# Patient Record
Sex: FEMALE | Race: BLACK OR AFRICAN AMERICAN | NOT HISPANIC OR LATINO | Employment: OTHER | URBAN - METROPOLITAN AREA
[De-identification: names, ages, dates, MRNs, and addresses within clinical notes are randomized per-mention and may not be internally consistent; named-entity substitution may affect disease eponyms.]

---

## 2017-05-17 ENCOUNTER — HISTORICAL (OUTPATIENT)
Dept: INTERNAL MEDICINE | Facility: CLINIC | Age: 52
End: 2017-05-17

## 2017-05-17 LAB
ABS NEUT (OLG): 2.65 X10(3)/MCL (ref 2.1–9.2)
ALBUMIN SERPL-MCNC: 3.5 GM/DL (ref 3.4–5)
ALBUMIN/GLOB SERPL: 1 {RATIO}
ALP SERPL-CCNC: 61 UNIT/L (ref 20–120)
ALT SERPL-CCNC: 14 UNIT/L
AST SERPL-CCNC: 24 UNIT/L
BASOPHILS # BLD AUTO: 0.09 X10(3)/MCL
BASOPHILS NFR BLD AUTO: 2 % (ref 0–1)
BILIRUB SERPL-MCNC: 0.5 MG/DL
BILIRUBIN DIRECT+TOT PNL SERPL-MCNC: <0.1 MG/DL
BILIRUBIN DIRECT+TOT PNL SERPL-MCNC: >0.4 MG/DL
BUN SERPL-MCNC: 17 MG/DL (ref 7–25)
CALCIUM SERPL-MCNC: 9 MG/DL (ref 8.4–10.3)
CHLORIDE SERPL-SCNC: 103 MMOL/L (ref 96–110)
CHOLEST SERPL-MCNC: 288 MG/DL
CHOLEST/HDLC SERPL: 7.2 {RATIO} (ref 0–4.4)
CO2 SERPL-SCNC: 27 MMOL/L (ref 24–32)
CREAT SERPL-MCNC: 0.91 MG/DL (ref 0.7–1.1)
CRP SERPL-MCNC: 0.7 MG/DL
EOSINOPHIL # BLD AUTO: 0.35 X10(3)/MCL
EOSINOPHIL NFR BLD AUTO: 6 % (ref 0–5)
ERYTHROCYTE [DISTWIDTH] IN BLOOD BY AUTOMATED COUNT: 13.4 % (ref 11.5–14.5)
ERYTHROCYTE [SEDIMENTATION RATE] IN BLOOD: 48 MM/HR (ref 0–20)
EST. AVERAGE GLUCOSE BLD GHB EST-MCNC: 237 MG/DL
GLOBULIN SER-MCNC: 3.8 GM/ML (ref 2.3–3.5)
GLUCOSE SERPL-MCNC: 84 MG/DL (ref 65–99)
HAV IGM SERPL QL IA: NONREACTIVE
HBA1C MFR BLD: 9.9 % (ref 4.7–5.6)
HBV CORE IGM SERPL QL IA: NONREACTIVE
HBV SURFACE AG SERPL QL IA: NEGATIVE
HCT VFR BLD AUTO: 37.5 % (ref 35–46)
HCV AB SERPL QL IA: REACTIVE
HDLC SERPL-MCNC: 40 MG/DL
HGB BLD-MCNC: 11.7 GM/DL (ref 12–16)
HIV 1+2 AB+HIV1 P24 AG SERPL QL IA: NONREACTIVE
IMM GRANULOCYTES # BLD AUTO: 0.01 10*3/UL
IMM GRANULOCYTES NFR BLD AUTO: 0 %
LDLC SERPL CALC-MCNC: 206 MG/DL (ref 0–130)
LYMPHOCYTES # BLD AUTO: 2.27 X10(3)/MCL
LYMPHOCYTES NFR BLD AUTO: 39 % (ref 15–40)
MCH RBC QN AUTO: 27.6 PG (ref 26–34)
MCHC RBC AUTO-ENTMCNC: 31.2 GM/DL (ref 31–37)
MCV RBC AUTO: 88.4 FL (ref 80–100)
MONOCYTES # BLD AUTO: 0.48 X10(3)/MCL
MONOCYTES NFR BLD AUTO: 8 % (ref 4–12)
NEUTROPHILS # BLD AUTO: 2.65 X10(3)/MCL
NEUTROPHILS NFR BLD AUTO: 45 X10(3)/MCL
PLATELET # BLD AUTO: 305 X10(3)/MCL (ref 130–400)
PMV BLD AUTO: 9.4 FL (ref 7.4–10.4)
POTASSIUM SERPL-SCNC: 4.8 MMOL/L (ref 3.6–5.2)
PROT SERPL-MCNC: 7.3 GM/DL (ref 6–8)
RBC # BLD AUTO: 4.24 X10(6)/MCL (ref 4–5.2)
SODIUM SERPL-SCNC: 139 MMOL/L (ref 135–146)
TRIGL SERPL-MCNC: 209 MG/DL
TSH SERPL-ACNC: 0.95 MIU/L (ref 0.5–5)
VLDLC SERPL CALC-MCNC: 42 MG/DL
WBC # SPEC AUTO: 5.8 X10(3)/MCL (ref 4.5–11)

## 2017-05-21 LAB
COLOR STL: NORMAL
CONSISTENCY STL: NORMAL
HEMOCCULT SP1 STL QL: NEGATIVE

## 2017-05-22 LAB
COLOR STL: NORMAL
COLOR STL: NORMAL
CONSISTENCY STL: NORMAL
CONSISTENCY STL: NORMAL
HEMOCCULT SP2 STL QL: NEGATIVE

## 2017-05-23 ENCOUNTER — HISTORICAL (OUTPATIENT)
Dept: INTERNAL MEDICINE | Facility: CLINIC | Age: 52
End: 2017-05-23

## 2017-08-16 ENCOUNTER — HISTORICAL (OUTPATIENT)
Dept: RADIOLOGY | Facility: HOSPITAL | Age: 52
End: 2017-08-16

## 2017-08-21 ENCOUNTER — HISTORICAL (OUTPATIENT)
Dept: WOUND CARE | Facility: HOSPITAL | Age: 52
End: 2017-08-21

## 2017-08-21 LAB
ALBUMIN SERPL-MCNC: 3.5 GM/DL (ref 3.4–5)
ALBUMIN/GLOB SERPL: 1 RATIO (ref 1–2)
ALP SERPL-CCNC: 78 UNIT/L (ref 45–117)
ALT SERPL-CCNC: 21 UNIT/L (ref 12–78)
AST SERPL-CCNC: 17 UNIT/L (ref 15–37)
BILIRUB SERPL-MCNC: 0.3 MG/DL (ref 0.2–1)
BILIRUBIN DIRECT+TOT PNL SERPL-MCNC: <0.1 MG/DL
BILIRUBIN DIRECT+TOT PNL SERPL-MCNC: >0.2 MG/DL
BUN SERPL-MCNC: 22 MG/DL (ref 7–18)
CALCIUM SERPL-MCNC: 9.6 MG/DL (ref 8.5–10.1)
CHLORIDE SERPL-SCNC: 108 MMOL/L (ref 98–107)
CHOLEST SERPL-MCNC: 197 MG/DL
CHOLEST/HDLC SERPL: 4.6 {RATIO} (ref 0–4.4)
CO2 SERPL-SCNC: 26 MMOL/L (ref 21–32)
CREAT SERPL-MCNC: 1 MG/DL (ref 0.6–1.3)
EST. AVERAGE GLUCOSE BLD GHB EST-MCNC: 194 MG/DL
GLOBULIN SER-MCNC: 4.6 GM/ML (ref 2.3–3.5)
GLUCOSE SERPL-MCNC: 175 MG/DL (ref 74–106)
HBA1C MFR BLD: 8.4 % (ref 4.2–6.3)
HDLC SERPL-MCNC: 43 MG/DL
LDLC SERPL CALC-MCNC: 114 MG/DL (ref 0–130)
POTASSIUM SERPL-SCNC: 5 MMOL/L (ref 3.5–5.1)
PROT SERPL-MCNC: 8.1 GM/DL (ref 6.4–8.2)
SODIUM SERPL-SCNC: 141 MMOL/L (ref 136–145)
TRIGL SERPL-MCNC: 202 MG/DL
VLDLC SERPL CALC-MCNC: 40 MG/DL

## 2017-11-28 ENCOUNTER — HISTORICAL (OUTPATIENT)
Dept: INTERNAL MEDICINE | Facility: CLINIC | Age: 52
End: 2017-11-28

## 2017-11-28 LAB
ALBUMIN SERPL-MCNC: 3.3 GM/DL (ref 3.4–5)
ALBUMIN/GLOB SERPL: 1 RATIO (ref 1–2)
ALP SERPL-CCNC: 84 UNIT/L (ref 45–117)
ALT SERPL-CCNC: 19 UNIT/L (ref 12–78)
AST SERPL-CCNC: 17 UNIT/L (ref 15–37)
BILIRUB SERPL-MCNC: 0.3 MG/DL (ref 0.2–1)
BILIRUBIN DIRECT+TOT PNL SERPL-MCNC: <0.1 MG/DL
BILIRUBIN DIRECT+TOT PNL SERPL-MCNC: ABNORMAL MG/DL
BUN SERPL-MCNC: 19 MG/DL (ref 7–18)
CALCIUM SERPL-MCNC: 8.7 MG/DL (ref 8.5–10.1)
CHLORIDE SERPL-SCNC: 104 MMOL/L (ref 98–107)
CHOLEST SERPL-MCNC: 301 MG/DL
CHOLEST/HDLC SERPL: 6.7 {RATIO} (ref 0–4.4)
CO2 SERPL-SCNC: 28 MMOL/L (ref 21–32)
CREAT SERPL-MCNC: 1 MG/DL (ref 0.6–1.3)
EST. AVERAGE GLUCOSE BLD GHB EST-MCNC: 200 MG/DL
GLOBULIN SER-MCNC: 4.6 GM/ML (ref 2.3–3.5)
GLUCOSE SERPL-MCNC: 204 MG/DL (ref 74–106)
HBA1C MFR BLD: 8.6 % (ref 4.2–6.3)
HDLC SERPL-MCNC: 45 MG/DL
LDLC SERPL CALC-MCNC: 199 MG/DL (ref 0–130)
POTASSIUM SERPL-SCNC: 4.6 MMOL/L (ref 3.5–5.1)
PROT SERPL-MCNC: 7.9 GM/DL (ref 6.4–8.2)
SODIUM SERPL-SCNC: 140 MMOL/L (ref 136–145)
TRIGL SERPL-MCNC: 286 MG/DL
VLDLC SERPL CALC-MCNC: 57 MG/DL

## 2018-03-23 ENCOUNTER — HISTORICAL (OUTPATIENT)
Dept: INTERNAL MEDICINE | Facility: CLINIC | Age: 53
End: 2018-03-23

## 2018-03-23 LAB
ABS NEUT (OLG): 2.81 X10(3)/MCL (ref 2.1–9.2)
ALBUMIN SERPL-MCNC: 3.5 GM/DL (ref 3.4–5)
ALBUMIN/GLOB SERPL: 1 RATIO (ref 1–2)
ALP SERPL-CCNC: 71 UNIT/L (ref 45–117)
ALT SERPL-CCNC: 22 UNIT/L (ref 12–78)
APPEARANCE, UA: ABNORMAL
AST SERPL-CCNC: 25 UNIT/L (ref 15–37)
BACTERIA #/AREA URNS AUTO: ABNORMAL /[HPF]
BASOPHILS # BLD AUTO: 0.1 X10(3)/MCL
BASOPHILS NFR BLD AUTO: 1 %
BILIRUB SERPL-MCNC: 0.4 MG/DL (ref 0.2–1)
BILIRUB UR QL STRIP: NEGATIVE
BILIRUBIN DIRECT+TOT PNL SERPL-MCNC: <0.1 MG/DL
BILIRUBIN DIRECT+TOT PNL SERPL-MCNC: ABNORMAL MG/DL
BUN SERPL-MCNC: 18 MG/DL (ref 7–18)
CALCIUM SERPL-MCNC: 9.3 MG/DL (ref 8.5–10.1)
CHLORIDE SERPL-SCNC: 106 MMOL/L (ref 98–107)
CHOLEST SERPL-MCNC: 156 MG/DL
CHOLEST/HDLC SERPL: 3.2 {RATIO} (ref 0–4.4)
CO2 SERPL-SCNC: 24 MMOL/L (ref 21–32)
COLOR UR: YELLOW
CREAT SERPL-MCNC: 1.1 MG/DL (ref 0.6–1.3)
CREAT UR-MCNC: 184 MG/DL
EOSINOPHIL # BLD AUTO: 0.47 X10(3)/MCL
EOSINOPHIL NFR BLD AUTO: 7 %
ERYTHROCYTE [DISTWIDTH] IN BLOOD BY AUTOMATED COUNT: 13.2 % (ref 11.5–14.5)
EST. AVERAGE GLUCOSE BLD GHB EST-MCNC: 200 MG/DL
GLOBULIN SER-MCNC: 4.8 GM/ML (ref 2.3–3.5)
GLUCOSE (UA): NORMAL
GLUCOSE SERPL-MCNC: 119 MG/DL (ref 74–106)
HBA1C MFR BLD: 8.6 % (ref 4.2–6.3)
HCT VFR BLD AUTO: 37.5 % (ref 35–46)
HDLC SERPL-MCNC: 48 MG/DL
HGB BLD-MCNC: 11.8 GM/DL (ref 12–16)
HGB UR QL STRIP: NEGATIVE
HYALINE CASTS #/AREA URNS LPF: ABNORMAL /[LPF]
IMM GRANULOCYTES # BLD AUTO: 0.01 10*3/UL
IMM GRANULOCYTES NFR BLD AUTO: 0 %
KETONES UR QL STRIP: NEGATIVE
LDLC SERPL CALC-MCNC: 80 MG/DL (ref 0–130)
LEUKOCYTE ESTERASE UR QL STRIP: 500 LEU/UL
LYMPHOCYTES # BLD AUTO: 2.94 X10(3)/MCL
LYMPHOCYTES NFR BLD AUTO: 42 % (ref 13–40)
MCH RBC QN AUTO: 27.8 PG (ref 26–34)
MCHC RBC AUTO-ENTMCNC: 31.5 GM/DL (ref 31–37)
MCV RBC AUTO: 88.2 FL (ref 80–100)
MICROALBUMIN UR-MCNC: 47 MG/L (ref 0–19)
MICROALBUMIN/CREAT RATIO PNL UR: 25.5 MCG/MG CR (ref 0–29)
MONOCYTES # BLD AUTO: 0.65 X10(3)/MCL
MONOCYTES NFR BLD AUTO: 9 % (ref 4–12)
NEUTROPHILS # BLD AUTO: 2.81 X10(3)/MCL
NEUTROPHILS NFR BLD AUTO: 40 X10(3)/MCL
NITRITE UR QL STRIP: NEGATIVE
PH UR STRIP: 5.5 [PH] (ref 4.5–8)
PLATELET # BLD AUTO: 334 X10(3)/MCL (ref 130–400)
PMV BLD AUTO: 9.4 FL (ref 7.4–10.4)
POTASSIUM SERPL-SCNC: 5.8 MMOL/L (ref 3.5–5.1)
PROT SERPL-MCNC: 8.3 GM/DL (ref 6.4–8.2)
PROT UR QL STRIP: 10 MG/DL
RBC # BLD AUTO: 4.25 X10(6)/MCL (ref 4–5.2)
RBC #/AREA URNS AUTO: ABNORMAL /[HPF]
SODIUM SERPL-SCNC: 136 MMOL/L (ref 136–145)
SP GR UR STRIP: 1.01 (ref 1–1.03)
SQUAMOUS #/AREA URNS LPF: ABNORMAL /[LPF]
TRIGL SERPL-MCNC: 141 MG/DL
TSH SERPL-ACNC: 1.47 MIU/L (ref 0.36–3.74)
UROBILINOGEN UR STRIP-ACNC: NORMAL
VLDLC SERPL CALC-MCNC: 28 MG/DL
WBC # SPEC AUTO: 7 X10(3)/MCL (ref 4.5–11)
WBC #/AREA URNS AUTO: >=100 /HPF

## 2018-04-20 ENCOUNTER — HISTORICAL (OUTPATIENT)
Dept: RADIOLOGY | Facility: HOSPITAL | Age: 53
End: 2018-04-20

## 2018-05-10 ENCOUNTER — HISTORICAL (OUTPATIENT)
Dept: RADIOLOGY | Facility: HOSPITAL | Age: 53
End: 2018-05-10

## 2018-05-15 ENCOUNTER — HISTORICAL (OUTPATIENT)
Dept: SLEEP MEDICINE | Facility: HOSPITAL | Age: 53
End: 2018-05-15

## 2018-08-07 ENCOUNTER — HISTORICAL (OUTPATIENT)
Dept: NUTRITION | Facility: HOSPITAL | Age: 53
End: 2018-08-07

## 2018-08-29 ENCOUNTER — HISTORICAL (OUTPATIENT)
Dept: WOUND CARE | Facility: HOSPITAL | Age: 53
End: 2018-08-29

## 2018-09-26 ENCOUNTER — HISTORICAL (OUTPATIENT)
Dept: ADMINISTRATIVE | Facility: HOSPITAL | Age: 53
End: 2018-09-26

## 2018-09-28 LAB — FINAL CULTURE: NORMAL

## 2019-06-11 ENCOUNTER — HISTORICAL (OUTPATIENT)
Dept: ADMINISTRATIVE | Facility: HOSPITAL | Age: 54
End: 2019-06-11

## 2019-06-11 LAB
BUN SERPL-MCNC: 14 MG/DL (ref 7–18)
CALCIUM SERPL-MCNC: 9.3 MG/DL (ref 8.5–10.1)
CHLORIDE SERPL-SCNC: 106 MMOL/L (ref 98–107)
CO2 SERPL-SCNC: 29 MMOL/L (ref 21–32)
CREAT SERPL-MCNC: 1 MG/DL (ref 0.6–1.3)
CREAT/UREA NIT SERPL: 14
EST. AVERAGE GLUCOSE BLD GHB EST-MCNC: 203 MG/DL
GLUCOSE SERPL-MCNC: 100 MG/DL (ref 74–106)
HBA1C MFR BLD: 8.7 % (ref 4.2–6.3)
POTASSIUM SERPL-SCNC: 4.4 MMOL/L (ref 3.5–5.1)
SODIUM SERPL-SCNC: 139 MMOL/L (ref 136–145)

## 2019-06-13 ENCOUNTER — HISTORICAL (OUTPATIENT)
Dept: RADIOLOGY | Facility: HOSPITAL | Age: 54
End: 2019-06-13

## 2019-08-30 ENCOUNTER — HISTORICAL (OUTPATIENT)
Dept: RADIOLOGY | Facility: HOSPITAL | Age: 54
End: 2019-08-30

## 2019-09-09 ENCOUNTER — HISTORICAL (OUTPATIENT)
Dept: INTERNAL MEDICINE | Facility: CLINIC | Age: 54
End: 2019-09-09

## 2019-09-09 LAB
ABS NEUT (OLG): 2.58 X10(3)/MCL (ref 2.1–9.2)
ALBUMIN SERPL-MCNC: 3.4 GM/DL (ref 3.4–5)
ALBUMIN/GLOB SERPL: 0.8 RATIO (ref 1.1–2)
ALP SERPL-CCNC: 85 UNIT/L (ref 45–117)
ALT SERPL-CCNC: 23 UNIT/L (ref 12–78)
AST SERPL-CCNC: 22 UNIT/L (ref 15–37)
BASOPHILS # BLD AUTO: 0.1 X10(3)/MCL (ref 0–0.2)
BASOPHILS NFR BLD AUTO: 2 %
BILIRUB SERPL-MCNC: 0.3 MG/DL (ref 0.2–1)
BILIRUBIN DIRECT+TOT PNL SERPL-MCNC: 0.1 MG/DL
BILIRUBIN DIRECT+TOT PNL SERPL-MCNC: 0.2 MG/DL
BUN SERPL-MCNC: 34 MG/DL (ref 7–18)
CALCIUM SERPL-MCNC: 9.2 MG/DL (ref 8.5–10.1)
CHLORIDE SERPL-SCNC: 104 MMOL/L (ref 98–107)
CHOLEST SERPL-MCNC: 309 MG/DL
CHOLEST/HDLC SERPL: 8.1 {RATIO} (ref 0–4.4)
CO2 SERPL-SCNC: 27 MMOL/L (ref 21–32)
CREAT SERPL-MCNC: 1.4 MG/DL (ref 0.6–1.3)
CREAT UR-MCNC: 132 MG/DL
EOSINOPHIL # BLD AUTO: 0.4 X10(3)/MCL (ref 0–0.9)
EOSINOPHIL NFR BLD AUTO: 7 %
ERYTHROCYTE [DISTWIDTH] IN BLOOD BY AUTOMATED COUNT: 13.4 % (ref 11.5–14.5)
EST. AVERAGE GLUCOSE BLD GHB EST-MCNC: 214 MG/DL
GLOBULIN SER-MCNC: 4.5 GM/ML (ref 2.3–3.5)
GLUCOSE SERPL-MCNC: 240 MG/DL (ref 74–106)
HBA1C MFR BLD: 9.1 % (ref 4.2–6.3)
HCT VFR BLD AUTO: 36.6 % (ref 35–46)
HDLC SERPL-MCNC: 38 MG/DL
HGB BLD-MCNC: 11.4 GM/DL (ref 12–16)
IMM GRANULOCYTES # BLD AUTO: 0.02 10*3/UL
IMM GRANULOCYTES NFR BLD AUTO: 0 %
LDLC SERPL CALC-MCNC: 208 MG/DL (ref 0–130)
LYMPHOCYTES # BLD AUTO: 2.4 X10(3)/MCL (ref 0.6–4.6)
LYMPHOCYTES NFR BLD AUTO: 40 %
MCH RBC QN AUTO: 27.9 PG (ref 26–34)
MCHC RBC AUTO-ENTMCNC: 31.1 GM/DL (ref 31–37)
MCV RBC AUTO: 89.5 FL (ref 80–100)
MICROALBUMIN UR-MCNC: 7.3 MG/L (ref 0–19)
MICROALBUMIN/CREAT RATIO PNL UR: 5.5 MCG/MG CR (ref 0–29)
MONOCYTES # BLD AUTO: 0.6 X10(3)/MCL (ref 0.1–1.3)
MONOCYTES NFR BLD AUTO: 9 %
NEUTROPHILS # BLD AUTO: 2.58 X10(3)/MCL (ref 2.1–9.2)
NEUTROPHILS NFR BLD AUTO: 42 %
PLATELET # BLD AUTO: 288 X10(3)/MCL (ref 130–400)
PMV BLD AUTO: 10.1 FL (ref 7.4–10.4)
POTASSIUM SERPL-SCNC: 5.2 MMOL/L (ref 3.5–5.1)
PROT SERPL-MCNC: 7.9 GM/DL (ref 6.4–8.2)
RBC # BLD AUTO: 4.09 X10(6)/MCL (ref 4–5.2)
SODIUM SERPL-SCNC: 138 MMOL/L (ref 136–145)
TRIGL SERPL-MCNC: 315 MG/DL
VLDLC SERPL CALC-MCNC: 63 MG/DL
WBC # SPEC AUTO: 6.2 X10(3)/MCL (ref 4.5–11)

## 2019-10-21 ENCOUNTER — HISTORICAL (OUTPATIENT)
Dept: RADIOLOGY | Facility: HOSPITAL | Age: 54
End: 2019-10-21

## 2019-10-29 ENCOUNTER — HISTORICAL (OUTPATIENT)
Dept: RADIOLOGY | Facility: HOSPITAL | Age: 54
End: 2019-10-29

## 2020-01-08 ENCOUNTER — HISTORICAL (OUTPATIENT)
Dept: INTERNAL MEDICINE | Facility: CLINIC | Age: 55
End: 2020-01-08

## 2020-01-08 LAB
ALBUMIN SERPL-MCNC: 3.6 GM/DL (ref 3.4–5)
ALBUMIN/GLOB SERPL: 0.8 RATIO (ref 1.1–2)
ALP SERPL-CCNC: 85 UNIT/L (ref 45–117)
ALT SERPL-CCNC: 23 UNIT/L (ref 12–78)
AST SERPL-CCNC: 25 UNIT/L (ref 15–37)
BILIRUB SERPL-MCNC: 0.2 MG/DL (ref 0.2–1)
BILIRUBIN DIRECT+TOT PNL SERPL-MCNC: <0.1 MG/DL (ref 0–0.2)
BILIRUBIN DIRECT+TOT PNL SERPL-MCNC: ABNORMAL MG/DL
BUN SERPL-MCNC: 17 MG/DL (ref 7–18)
CALCIUM SERPL-MCNC: 8.8 MG/DL (ref 8.5–10.1)
CHLORIDE SERPL-SCNC: 105 MMOL/L (ref 98–107)
CHOLEST SERPL-MCNC: 196 MG/DL
CHOLEST/HDLC SERPL: 4.7 {RATIO} (ref 0–4.4)
CO2 SERPL-SCNC: 29 MMOL/L (ref 21–32)
CREAT SERPL-MCNC: 1.1 MG/DL (ref 0.6–1.3)
EST. AVERAGE GLUCOSE BLD GHB EST-MCNC: 240 MG/DL
GLOBULIN SER-MCNC: 4.3 GM/ML (ref 2.3–3.5)
GLUCOSE SERPL-MCNC: 97 MG/DL (ref 74–106)
HBA1C MFR BLD: 10 % (ref 4.2–6.3)
HDLC SERPL-MCNC: 42 MG/DL (ref 40–59)
LDLC SERPL CALC-MCNC: 110 MG/DL
POTASSIUM SERPL-SCNC: 6 MMOL/L (ref 3.5–5.1)
PROT SERPL-MCNC: 7.9 GM/DL (ref 6.4–8.2)
SODIUM SERPL-SCNC: 138 MMOL/L (ref 136–145)
TRIGL SERPL-MCNC: 222 MG/DL
VLDLC SERPL CALC-MCNC: 44 MG/DL

## 2020-03-02 ENCOUNTER — HISTORICAL (OUTPATIENT)
Dept: ADMINISTRATIVE | Facility: HOSPITAL | Age: 55
End: 2020-03-02

## 2020-03-02 LAB
BUN SERPL-MCNC: 27 MG/DL (ref 7–18)
CALCIUM SERPL-MCNC: 8.7 MG/DL (ref 8.5–10.1)
CHLORIDE SERPL-SCNC: 106 MMOL/L (ref 98–107)
CO2 SERPL-SCNC: 26 MMOL/L (ref 21–32)
CREAT SERPL-MCNC: 1.2 MG/DL (ref 0.6–1.3)
CREAT/UREA NIT SERPL: 22
EST. AVERAGE GLUCOSE BLD GHB EST-MCNC: 217 MG/DL
GLUCOSE SERPL-MCNC: 217 MG/DL (ref 74–106)
HBA1C MFR BLD: 9.2 % (ref 4.2–6.3)
POTASSIUM SERPL-SCNC: 4.9 MMOL/L (ref 3.5–5.1)
SODIUM SERPL-SCNC: 137 MMOL/L (ref 136–145)

## 2020-06-12 ENCOUNTER — HISTORICAL (OUTPATIENT)
Dept: RADIOLOGY | Facility: HOSPITAL | Age: 55
End: 2020-06-12

## 2020-07-21 ENCOUNTER — HISTORICAL (OUTPATIENT)
Dept: RADIOLOGY | Facility: HOSPITAL | Age: 55
End: 2020-07-21

## 2020-07-21 LAB
ALBUMIN SERPL-MCNC: 3.3 GM/DL (ref 3.4–5)
ALBUMIN/GLOB SERPL: 0.8 RATIO (ref 1.1–2)
ALP SERPL-CCNC: 73 UNIT/L (ref 45–117)
ALT SERPL-CCNC: 21 UNIT/L (ref 12–78)
AST SERPL-CCNC: 21 UNIT/L (ref 15–37)
BILIRUB SERPL-MCNC: 0.3 MG/DL (ref 0.2–1)
BILIRUBIN DIRECT+TOT PNL SERPL-MCNC: 0.1 MG/DL (ref 0–0.2)
BILIRUBIN DIRECT+TOT PNL SERPL-MCNC: 0.2 MG/DL
BUN SERPL-MCNC: 18 MG/DL (ref 7–18)
CALCIUM SERPL-MCNC: 8.8 MG/DL (ref 8.5–10.1)
CHLORIDE SERPL-SCNC: 107 MMOL/L (ref 98–107)
CHOLEST SERPL-MCNC: 234 MG/DL
CHOLEST/HDLC SERPL: 6.7 {RATIO} (ref 0–4.4)
CO2 SERPL-SCNC: 25 MMOL/L (ref 21–32)
CREAT SERPL-MCNC: 1.2 MG/DL (ref 0.6–1.3)
EST. AVERAGE GLUCOSE BLD GHB EST-MCNC: 197 MG/DL
GLOBULIN SER-MCNC: 4.3 GM/ML (ref 2.3–3.5)
GLUCOSE SERPL-MCNC: 196 MG/DL (ref 74–106)
HBA1C MFR BLD: 8.5 % (ref 4.2–6.3)
HDLC SERPL-MCNC: 35 MG/DL (ref 40–59)
LDLC SERPL CALC-MCNC: 148 MG/DL
POTASSIUM SERPL-SCNC: 4.8 MMOL/L (ref 3.5–5.1)
PROT SERPL-MCNC: 7.6 GM/DL (ref 6.4–8.2)
SODIUM SERPL-SCNC: 138 MMOL/L (ref 136–145)
TRIGL SERPL-MCNC: 254 MG/DL
VLDLC SERPL CALC-MCNC: 51 MG/DL

## 2020-08-24 LAB
HUMAN PAPILLOMAVIRUS (HPV): NORMAL
PAP RECOMMENDATION EXT: NORMAL
PAP SMEAR: NORMAL

## 2020-10-20 ENCOUNTER — HISTORICAL (OUTPATIENT)
Dept: CARDIOLOGY | Facility: CLINIC | Age: 55
End: 2020-10-20

## 2020-10-20 LAB
ABS NEUT (OLG): 3.31 X10(3)/MCL (ref 2.1–9.2)
ALBUMIN SERPL-MCNC: 3.5 GM/DL (ref 3.4–5)
ALBUMIN/GLOB SERPL: 0.7 RATIO (ref 1.1–2)
ALP SERPL-CCNC: 89 UNIT/L (ref 45–117)
ALT SERPL-CCNC: 22 UNIT/L (ref 12–78)
AST SERPL-CCNC: 21 UNIT/L (ref 15–37)
BASOPHILS # BLD AUTO: 0.1 X10(3)/MCL (ref 0–0.2)
BASOPHILS NFR BLD AUTO: 2 %
BILIRUB SERPL-MCNC: 0.2 MG/DL (ref 0.2–1)
BILIRUBIN DIRECT+TOT PNL SERPL-MCNC: <0.1 MG/DL (ref 0–0.2)
BILIRUBIN DIRECT+TOT PNL SERPL-MCNC: ABNORMAL MG/DL
BUN SERPL-MCNC: 29 MG/DL (ref 7–18)
CALCIUM SERPL-MCNC: 8.9 MG/DL (ref 8.5–10.1)
CHLORIDE SERPL-SCNC: 105 MMOL/L (ref 98–107)
CHOLEST SERPL-MCNC: 226 MG/DL
CHOLEST/HDLC SERPL: 5.1 {RATIO} (ref 0–4.4)
CO2 SERPL-SCNC: 26 MMOL/L (ref 21–32)
CREAT SERPL-MCNC: 1.3 MG/DL (ref 0.6–1.3)
EOSINOPHIL # BLD AUTO: 0.6 X10(3)/MCL (ref 0–0.9)
EOSINOPHIL NFR BLD AUTO: 8 %
ERYTHROCYTE [DISTWIDTH] IN BLOOD BY AUTOMATED COUNT: 13.4 % (ref 11.5–14.5)
GLOBULIN SER-MCNC: 4.7 GM/ML (ref 2.3–3.5)
GLUCOSE SERPL-MCNC: 225 MG/DL (ref 74–106)
HCT VFR BLD AUTO: 37.6 % (ref 35–46)
HDLC SERPL-MCNC: 44 MG/DL (ref 40–59)
HGB BLD-MCNC: 11.7 GM/DL (ref 12–16)
IMM GRANULOCYTES # BLD AUTO: 0.03 10*3/UL
IMM GRANULOCYTES NFR BLD AUTO: 0 %
LDLC SERPL CALC-MCNC: 131 MG/DL
LYMPHOCYTES # BLD AUTO: 2.4 X10(3)/MCL (ref 0.6–4.6)
LYMPHOCYTES NFR BLD AUTO: 34 %
MCH RBC QN AUTO: 28.5 PG (ref 26–34)
MCHC RBC AUTO-ENTMCNC: 31.1 GM/DL (ref 31–37)
MCV RBC AUTO: 91.7 FL (ref 80–100)
MONOCYTES # BLD AUTO: 0.7 X10(3)/MCL (ref 0.1–1.3)
MONOCYTES NFR BLD AUTO: 10 %
NEUTROPHILS # BLD AUTO: 3.31 X10(3)/MCL (ref 2.1–9.2)
NEUTROPHILS NFR BLD AUTO: 46 %
PLATELET # BLD AUTO: 323 X10(3)/MCL (ref 130–400)
PMV BLD AUTO: 10.1 FL (ref 7.4–10.4)
POTASSIUM SERPL-SCNC: 5.2 MMOL/L (ref 3.5–5.1)
PROT SERPL-MCNC: 8.2 GM/DL (ref 6.4–8.2)
RBC # BLD AUTO: 4.1 X10(6)/MCL (ref 4–5.2)
SODIUM SERPL-SCNC: 136 MMOL/L (ref 136–145)
TRIGL SERPL-MCNC: 255 MG/DL
TSH SERPL-ACNC: 1.65 MIU/L (ref 0.36–3.74)
VLDLC SERPL CALC-MCNC: 51 MG/DL
WBC # SPEC AUTO: 7.1 X10(3)/MCL (ref 4.5–11)

## 2020-12-24 ENCOUNTER — HISTORICAL (OUTPATIENT)
Dept: ADMINISTRATIVE | Facility: HOSPITAL | Age: 55
End: 2020-12-24

## 2020-12-24 LAB
ALBUMIN SERPL-MCNC: 3.5 GM/DL (ref 3.5–5)
ALBUMIN/GLOB SERPL: 0.8 RATIO (ref 1.1–2)
ALP SERPL-CCNC: 95 UNIT/L (ref 40–150)
ALT SERPL-CCNC: 13 UNIT/L (ref 0–55)
AST SERPL-CCNC: 17 UNIT/L (ref 5–34)
BILIRUB SERPL-MCNC: 0.3 MG/DL
BILIRUBIN DIRECT+TOT PNL SERPL-MCNC: 0.1 MG/DL (ref 0–0.5)
BILIRUBIN DIRECT+TOT PNL SERPL-MCNC: 0.2 MG/DL (ref 0–0.8)
BUN SERPL-MCNC: 32 MG/DL (ref 9.8–20.1)
CALCIUM SERPL-MCNC: 8.8 MG/DL (ref 8.4–10.2)
CHLORIDE SERPL-SCNC: 102 MMOL/L (ref 98–107)
CHOLEST SERPL-MCNC: 231 MG/DL
CHOLEST/HDLC SERPL: 6 {RATIO} (ref 0–5)
CO2 SERPL-SCNC: 23 MMOL/L (ref 22–29)
CREAT SERPL-MCNC: 1.47 MG/DL (ref 0.55–1.02)
EST. AVERAGE GLUCOSE BLD GHB EST-MCNC: 231.7 MG/DL
GLOBULIN SER-MCNC: 4.2 GM/DL (ref 2.4–3.5)
GLUCOSE SERPL-MCNC: 277 MG/DL (ref 74–100)
HBA1C MFR BLD: 9.7 %
HDLC SERPL-MCNC: 38 MG/DL (ref 35–60)
LDLC SERPL CALC-MCNC: 153 MG/DL (ref 50–140)
POTASSIUM SERPL-SCNC: 5.7 MMOL/L (ref 3.5–5.1)
PROT SERPL-MCNC: 7.7 GM/DL (ref 6.4–8.3)
SODIUM SERPL-SCNC: 136 MMOL/L (ref 136–145)
TRIGL SERPL-MCNC: 198 MG/DL (ref 37–140)
VLDLC SERPL CALC-MCNC: 40 MG/DL

## 2021-02-08 ENCOUNTER — HISTORICAL (OUTPATIENT)
Dept: ADMINISTRATIVE | Facility: HOSPITAL | Age: 56
End: 2021-02-08

## 2021-02-08 LAB — POTASSIUM SERPL-SCNC: 5.3 MMOL/L (ref 3.5–5.1)

## 2021-02-18 ENCOUNTER — HISTORICAL (OUTPATIENT)
Dept: NEPHROLOGY | Facility: CLINIC | Age: 56
End: 2021-02-18

## 2021-02-18 LAB
ALBUMIN SERPL-MCNC: 3.8 GM/DL (ref 3.5–5)
ALBUMIN/GLOB SERPL: 0.9 RATIO (ref 1.1–2)
ALP SERPL-CCNC: 87 UNIT/L (ref 40–150)
ALT SERPL-CCNC: 17 UNIT/L (ref 0–55)
APPEARANCE, UA: ABNORMAL
AST SERPL-CCNC: 24 UNIT/L (ref 5–34)
BACTERIA #/AREA URNS AUTO: ABNORMAL /HPF
BILIRUB SERPL-MCNC: 0.4 MG/DL
BILIRUB UR QL STRIP: NEGATIVE
BILIRUBIN DIRECT+TOT PNL SERPL-MCNC: 0.2 MG/DL (ref 0–0.5)
BILIRUBIN DIRECT+TOT PNL SERPL-MCNC: 0.2 MG/DL (ref 0–0.8)
BUN SERPL-MCNC: 23 MG/DL (ref 9.8–20.1)
CALCIUM SERPL-MCNC: 9.7 MG/DL (ref 8.4–10.2)
CHLORIDE SERPL-SCNC: 108 MMOL/L (ref 98–107)
CO2 SERPL-SCNC: 21 MMOL/L (ref 22–29)
COLOR UR: YELLOW
CREAT SERPL-MCNC: 1.27 MG/DL (ref 0.55–1.02)
CREAT UR-MCNC: 400.3 MG/DL (ref 45–106)
GLOBULIN SER-MCNC: 4.4 GM/DL (ref 2.4–3.5)
GLUCOSE (UA): NEGATIVE
GLUCOSE SERPL-MCNC: 182 MG/DL (ref 74–100)
HGB UR QL STRIP: NEGATIVE
HYALINE CASTS #/AREA URNS LPF: ABNORMAL /LPF
KETONES UR QL STRIP: ABNORMAL
LEUKOCYTE ESTERASE UR QL STRIP: 500 LEU/UL
NITRITE UR QL STRIP: NEGATIVE
PH UR STRIP: 5.5 [PH] (ref 4.5–8)
POTASSIUM SERPL-SCNC: 5.9 MMOL/L (ref 3.5–5.1)
PROT SERPL-MCNC: 8.2 GM/DL (ref 6.4–8.3)
PROT UR QL STRIP: 50 MG/DL
PROT UR STRIP-MCNC: 43.8 MG/DL
PROT/CREAT UR-RTO: 109.4 MG/GM CR
RBC #/AREA URNS AUTO: ABNORMAL /HPF
SODIUM SERPL-SCNC: 140 MMOL/L (ref 136–145)
SP GR UR STRIP: 1.02 (ref 1–1.03)
SQUAMOUS #/AREA URNS LPF: ABNORMAL /LPF
URATE SERPL-MCNC: 6.5 MG/DL (ref 2.6–6)
UROBILINOGEN UR STRIP-ACNC: NORMAL
WBC #/AREA URNS AUTO: ABNORMAL /HPF

## 2021-02-20 LAB — FINAL CULTURE: NO GROWTH

## 2021-03-04 ENCOUNTER — HISTORICAL (OUTPATIENT)
Dept: NEPHROLOGY | Facility: CLINIC | Age: 56
End: 2021-03-04

## 2021-03-04 LAB
ALBUMIN SERPL-MCNC: 3.9 GM/DL (ref 3.5–5)
ALBUMIN/GLOB SERPL: 1.1 RATIO (ref 1.1–2)
ALP SERPL-CCNC: 79 UNIT/L (ref 40–150)
ALT SERPL-CCNC: 24 UNIT/L (ref 0–55)
AST SERPL-CCNC: 30 UNIT/L (ref 5–34)
BILIRUB SERPL-MCNC: 0.4 MG/DL
BILIRUBIN DIRECT+TOT PNL SERPL-MCNC: 0.2 MG/DL (ref 0–0.5)
BILIRUBIN DIRECT+TOT PNL SERPL-MCNC: 0.2 MG/DL (ref 0–0.8)
BUN SERPL-MCNC: 19.4 MG/DL (ref 9.8–20.1)
CALCIUM SERPL-MCNC: 9.1 MG/DL (ref 8.4–10.2)
CHLORIDE SERPL-SCNC: 109 MMOL/L (ref 98–107)
CHOLEST SERPL-MCNC: 142 MG/DL
CHOLEST/HDLC SERPL: 4 {RATIO} (ref 0–5)
CO2 SERPL-SCNC: 21 MMOL/L (ref 22–29)
CREAT SERPL-MCNC: 1.18 MG/DL (ref 0.55–1.02)
EST. AVERAGE GLUCOSE BLD GHB EST-MCNC: 185.8 MG/DL
GLOBULIN SER-MCNC: 3.5 GM/DL (ref 2.4–3.5)
GLUCOSE SERPL-MCNC: 110 MG/DL (ref 74–100)
HBA1C MFR BLD: 8.1 %
HDLC SERPL-MCNC: 39 MG/DL (ref 35–60)
LDLC SERPL CALC-MCNC: 78 MG/DL (ref 50–140)
POTASSIUM SERPL-SCNC: 6 MMOL/L (ref 3.5–5.1)
PROT SERPL-MCNC: 7.4 GM/DL (ref 6.4–8.3)
SODIUM SERPL-SCNC: 138 MMOL/L (ref 136–145)
TRIGL SERPL-MCNC: 125 MG/DL (ref 37–140)
VLDLC SERPL CALC-MCNC: 25 MG/DL

## 2021-03-08 ENCOUNTER — HISTORICAL (OUTPATIENT)
Dept: ADMINISTRATIVE | Facility: HOSPITAL | Age: 56
End: 2021-03-08

## 2021-03-08 LAB — POTASSIUM SERPL-SCNC: 4.7 MMOL/L (ref 3.5–5.1)

## 2021-04-17 ENCOUNTER — HISTORICAL (OUTPATIENT)
Dept: LAB | Facility: HOSPITAL | Age: 56
End: 2021-04-17

## 2021-04-17 LAB — OCCULT BLOOD, FECAL, IA: NEGATIVE

## 2021-04-22 ENCOUNTER — HISTORICAL (OUTPATIENT)
Dept: RADIOLOGY | Facility: HOSPITAL | Age: 56
End: 2021-04-22

## 2021-05-17 ENCOUNTER — HISTORICAL (OUTPATIENT)
Dept: RADIOLOGY | Facility: HOSPITAL | Age: 56
End: 2021-05-17

## 2021-05-27 ENCOUNTER — HISTORICAL (OUTPATIENT)
Dept: NEPHROLOGY | Facility: CLINIC | Age: 56
End: 2021-05-27

## 2021-05-27 LAB
ALBUMIN SERPL-MCNC: 3.6 GM/DL (ref 3.5–5)
ALBUMIN/GLOB SERPL: 0.8 RATIO (ref 1.1–2)
ALP SERPL-CCNC: 78 UNIT/L (ref 40–150)
ALT SERPL-CCNC: 26 UNIT/L (ref 0–55)
APPEARANCE, UA: CLEAR
AST SERPL-CCNC: 32 UNIT/L (ref 5–34)
BACTERIA #/AREA URNS AUTO: ABNORMAL /HPF
BILIRUB SERPL-MCNC: 0.3 MG/DL
BILIRUB UR QL STRIP: NEGATIVE
BILIRUBIN DIRECT+TOT PNL SERPL-MCNC: 0.1 MG/DL (ref 0–0.8)
BILIRUBIN DIRECT+TOT PNL SERPL-MCNC: 0.2 MG/DL (ref 0–0.5)
BUN SERPL-MCNC: 25.3 MG/DL (ref 9.8–20.1)
CALCIUM SERPL-MCNC: 9.8 MG/DL (ref 8.4–10.2)
CHLORIDE SERPL-SCNC: 111 MMOL/L (ref 98–107)
CO2 SERPL-SCNC: 20 MMOL/L (ref 22–29)
COLOR UR: YELLOW
CREAT SERPL-MCNC: 1.27 MG/DL (ref 0.55–1.02)
CREAT UR-MCNC: 250.8 MG/DL (ref 45–106)
GLOBULIN SER-MCNC: 4.3 GM/DL (ref 2.4–3.5)
GLUCOSE (UA): NEGATIVE
GLUCOSE SERPL-MCNC: 97 MG/DL (ref 74–100)
HGB UR QL STRIP: NEGATIVE
HYALINE CASTS #/AREA URNS LPF: ABNORMAL /LPF
KETONES UR QL STRIP: NEGATIVE
LEUKOCYTE ESTERASE UR QL STRIP: 250 LEU/UL
NITRITE UR QL STRIP: NEGATIVE
PH UR STRIP: 5 [PH] (ref 4.5–8)
POTASSIUM SERPL-SCNC: 5.6 MMOL/L (ref 3.5–5.1)
PROT SERPL-MCNC: 7.9 GM/DL (ref 6.4–8.3)
PROT UR QL STRIP: 20 MG/DL
PROT UR STRIP-MCNC: 14.1 MG/DL
PROT/CREAT UR-RTO: 56.2 MG/GM CR
RBC #/AREA URNS AUTO: ABNORMAL /HPF
SODIUM SERPL-SCNC: 140 MMOL/L (ref 136–145)
SP GR UR STRIP: 1.02 (ref 1–1.03)
SQUAMOUS #/AREA URNS LPF: ABNORMAL /LPF
URATE SERPL-MCNC: 6.5 MG/DL (ref 2.6–6)
UROBILINOGEN UR STRIP-ACNC: NORMAL
WBC #/AREA URNS AUTO: ABNORMAL /HPF

## 2021-05-29 LAB — FINAL CULTURE: NORMAL

## 2021-06-21 ENCOUNTER — HISTORICAL (OUTPATIENT)
Dept: ADMINISTRATIVE | Facility: HOSPITAL | Age: 56
End: 2021-06-21

## 2021-06-21 LAB
ALBUMIN SERPL-MCNC: 3.6 GM/DL (ref 3.5–5)
ALBUMIN/GLOB SERPL: 0.9 RATIO (ref 1.1–2)
ALP SERPL-CCNC: 65 UNIT/L (ref 40–150)
ALT SERPL-CCNC: 21 UNIT/L (ref 0–55)
AST SERPL-CCNC: 29 UNIT/L (ref 5–34)
BILIRUB SERPL-MCNC: 0.4 MG/DL
BILIRUBIN DIRECT+TOT PNL SERPL-MCNC: 0.2 MG/DL (ref 0–0.5)
BILIRUBIN DIRECT+TOT PNL SERPL-MCNC: 0.2 MG/DL (ref 0–0.8)
BUN SERPL-MCNC: 23 MG/DL (ref 9.8–20.1)
CALCIUM SERPL-MCNC: 9.7 MG/DL (ref 8.4–10.2)
CHLORIDE SERPL-SCNC: 107 MMOL/L (ref 98–107)
CO2 SERPL-SCNC: 26 MMOL/L (ref 22–29)
CREAT SERPL-MCNC: 1.1 MG/DL (ref 0.55–1.02)
EST. AVERAGE GLUCOSE BLD GHB EST-MCNC: 122.6 MG/DL
GLOBULIN SER-MCNC: 4 GM/DL (ref 2.4–3.5)
GLUCOSE SERPL-MCNC: 63 MG/DL (ref 74–100)
HBA1C MFR BLD: 5.9 %
POTASSIUM SERPL-SCNC: 4.5 MMOL/L (ref 3.5–5.1)
PROT SERPL-MCNC: 7.6 GM/DL (ref 6.4–8.3)
SODIUM SERPL-SCNC: 139 MMOL/L (ref 136–145)

## 2021-09-17 ENCOUNTER — HISTORICAL (OUTPATIENT)
Dept: CARDIOLOGY | Facility: CLINIC | Age: 56
End: 2021-09-17

## 2021-09-17 LAB
CHOLEST SERPL-MCNC: 142 MG/DL
CHOLEST/HDLC SERPL: 4 {RATIO} (ref 0–5)
HDLC SERPL-MCNC: 38 MG/DL (ref 35–60)
LDLC SERPL CALC-MCNC: 79 MG/DL (ref 50–140)
TRIGL SERPL-MCNC: 127 MG/DL (ref 37–140)
VLDLC SERPL CALC-MCNC: 25 MG/DL

## 2021-10-01 ENCOUNTER — HISTORICAL (OUTPATIENT)
Dept: NEPHROLOGY | Facility: CLINIC | Age: 56
End: 2021-10-01

## 2021-10-01 LAB
ALBUMIN SERPL-MCNC: 3.6 GM/DL (ref 3.5–5)
ALBUMIN/GLOB SERPL: 0.9 RATIO (ref 1.1–2)
ALP SERPL-CCNC: 74 UNIT/L (ref 40–150)
ALT SERPL-CCNC: 30 UNIT/L (ref 0–55)
APPEARANCE, UA: CLEAR
AST SERPL-CCNC: 35 UNIT/L (ref 5–34)
BACTERIA #/AREA URNS AUTO: ABNORMAL /HPF
BILIRUB SERPL-MCNC: 0.3 MG/DL
BILIRUB UR QL STRIP: NEGATIVE
BILIRUBIN DIRECT+TOT PNL SERPL-MCNC: 0.1 MG/DL (ref 0–0.5)
BILIRUBIN DIRECT+TOT PNL SERPL-MCNC: 0.2 MG/DL (ref 0–0.8)
BUN SERPL-MCNC: 19.7 MG/DL (ref 9.8–20.1)
CALCIUM SERPL-MCNC: 9.6 MG/DL (ref 8.4–10.2)
CHLORIDE SERPL-SCNC: 107 MMOL/L (ref 98–107)
CO2 SERPL-SCNC: 27 MMOL/L (ref 22–29)
COLOR UR: YELLOW
CREAT SERPL-MCNC: 1.47 MG/DL (ref 0.55–1.02)
CREAT UR-MCNC: 187 MG/DL (ref 45–106)
GLOBULIN SER-MCNC: 4.1 GM/DL (ref 2.4–3.5)
GLUCOSE (UA): NEGATIVE
GLUCOSE SERPL-MCNC: 133 MG/DL (ref 74–100)
HGB UR QL STRIP: NEGATIVE
HYALINE CASTS #/AREA URNS LPF: ABNORMAL /LPF
KETONES UR QL STRIP: NEGATIVE
LEUKOCYTE ESTERASE UR QL STRIP: 500 LEU/UL
NITRITE UR QL STRIP: NEGATIVE
PH UR STRIP: 5.5 [PH] (ref 4.5–8)
POTASSIUM SERPL-SCNC: 5.7 MMOL/L (ref 3.5–5.1)
PROT SERPL-MCNC: 7.7 GM/DL (ref 6.4–8.3)
PROT UR QL STRIP: 10 MG/DL
PROT UR STRIP-MCNC: 11.4 MG/DL
PROT/CREAT UR-RTO: 61 MG/GM CR
RBC #/AREA URNS AUTO: ABNORMAL /HPF
SODIUM SERPL-SCNC: 137 MMOL/L (ref 136–145)
SP GR UR STRIP: 1.02 (ref 1–1.03)
SQUAMOUS #/AREA URNS LPF: ABNORMAL /LPF
UROBILINOGEN UR STRIP-ACNC: 2 MG/DL
WBC #/AREA URNS AUTO: >=100 /HPF

## 2021-10-03 LAB — FINAL CULTURE: NORMAL

## 2021-10-11 ENCOUNTER — HISTORICAL (OUTPATIENT)
Dept: ADMINISTRATIVE | Facility: HOSPITAL | Age: 56
End: 2021-10-11

## 2021-10-11 LAB
ABS NEUT (OLG): 2.02 X10(3)/MCL (ref 2.1–9.2)
ALBUMIN SERPL-MCNC: 3.5 GM/DL (ref 3.5–5)
ALBUMIN/GLOB SERPL: 0.9 RATIO (ref 1.1–2)
ALP SERPL-CCNC: 69 UNIT/L (ref 40–150)
ALT SERPL-CCNC: 22 UNIT/L (ref 0–55)
APPEARANCE, UA: CLEAR
AST SERPL-CCNC: 31 UNIT/L (ref 5–34)
BACTERIA #/AREA URNS AUTO: ABNORMAL /HPF
BASOPHILS # BLD AUTO: 0.1 X10(3)/MCL (ref 0–0.2)
BASOPHILS NFR BLD AUTO: 2 %
BILIRUB SERPL-MCNC: 0.3 MG/DL
BILIRUB UR QL STRIP: NEGATIVE
BILIRUBIN DIRECT+TOT PNL SERPL-MCNC: 0.1 MG/DL (ref 0–0.5)
BILIRUBIN DIRECT+TOT PNL SERPL-MCNC: 0.2 MG/DL (ref 0–0.8)
BUN SERPL-MCNC: 17.4 MG/DL (ref 9.8–20.1)
CALCIUM SERPL-MCNC: 9.9 MG/DL (ref 8.4–10.2)
CHLORIDE SERPL-SCNC: 108 MMOL/L (ref 98–107)
CHOLEST SERPL-MCNC: 144 MG/DL
CHOLEST/HDLC SERPL: 4 {RATIO} (ref 0–5)
CO2 SERPL-SCNC: 23 MMOL/L (ref 22–29)
COLOR UR: YELLOW
CREAT SERPL-MCNC: 1.2 MG/DL (ref 0.55–1.02)
CREAT UR-MCNC: 216.2 MG/DL (ref 45–106)
EOSINOPHIL # BLD AUTO: 0.5 X10(3)/MCL (ref 0–0.9)
EOSINOPHIL NFR BLD AUTO: 9 %
ERYTHROCYTE [DISTWIDTH] IN BLOOD BY AUTOMATED COUNT: 13.2 % (ref 11.5–14.5)
EST. AVERAGE GLUCOSE BLD GHB EST-MCNC: 139.8 MG/DL
GLOBULIN SER-MCNC: 4 GM/DL (ref 2.4–3.5)
GLUCOSE (UA): NEGATIVE
GLUCOSE SERPL-MCNC: 131 MG/DL (ref 74–100)
HBA1C MFR BLD: 6.5 %
HCT VFR BLD AUTO: 35.8 % (ref 35–46)
HDLC SERPL-MCNC: 33 MG/DL (ref 35–60)
HGB BLD-MCNC: 11.5 GM/DL (ref 12–16)
HGB UR QL STRIP: NEGATIVE
HYALINE CASTS #/AREA URNS LPF: ABNORMAL /LPF
IMM GRANULOCYTES # BLD AUTO: 0.01 10*3/UL
IMM GRANULOCYTES NFR BLD AUTO: 0 %
KETONES UR QL STRIP: NEGATIVE
LDLC SERPL CALC-MCNC: 76 MG/DL (ref 50–140)
LEUKOCYTE ESTERASE UR QL STRIP: 250 LEU/UL
LYMPHOCYTES # BLD AUTO: 2.3 X10(3)/MCL (ref 0.6–4.6)
LYMPHOCYTES NFR BLD AUTO: 43 %
MCH RBC QN AUTO: 29.6 PG (ref 26–34)
MCHC RBC AUTO-ENTMCNC: 32.1 GM/DL (ref 31–37)
MCV RBC AUTO: 92 FL (ref 80–100)
MICROALBUMIN UR-MCNC: 7.5 MG/L
MICROALBUMIN/CREAT RATIO PNL UR: 3.5 MG/GM CR (ref 0–30)
MONOCYTES # BLD AUTO: 0.5 X10(3)/MCL (ref 0.1–1.3)
MONOCYTES NFR BLD AUTO: 9 %
NEUTROPHILS # BLD AUTO: 2.02 X10(3)/MCL (ref 2.1–9.2)
NEUTROPHILS NFR BLD AUTO: 38 %
NITRITE UR QL STRIP: NEGATIVE
NRBC BLD AUTO-RTO: 0 % (ref 0–0.2)
PH UR STRIP: 5.5 [PH] (ref 4.5–8)
PLATELET # BLD AUTO: 317 X10(3)/MCL (ref 130–400)
PMV BLD AUTO: 9.4 FL (ref 7.4–10.4)
POTASSIUM SERPL-SCNC: 5 MMOL/L (ref 3.5–5.1)
PROT SERPL-MCNC: 7.5 GM/DL (ref 6.4–8.3)
PROT UR QL STRIP: NEGATIVE
RBC # BLD AUTO: 3.89 X10(6)/MCL (ref 4–5.2)
RBC #/AREA URNS AUTO: ABNORMAL /HPF
SODIUM SERPL-SCNC: 138 MMOL/L (ref 136–145)
SP GR UR STRIP: 1.02 (ref 1–1.03)
SQUAMOUS #/AREA URNS LPF: ABNORMAL /LPF
TRIGL SERPL-MCNC: 176 MG/DL (ref 37–140)
UROBILINOGEN UR STRIP-ACNC: NORMAL
VLDLC SERPL CALC-MCNC: 35 MG/DL
WBC # SPEC AUTO: 5.4 X10(3)/MCL (ref 4.5–11)
WBC #/AREA URNS AUTO: ABNORMAL /HPF

## 2021-10-20 ENCOUNTER — HISTORICAL (OUTPATIENT)
Dept: NEPHROLOGY | Facility: CLINIC | Age: 56
End: 2021-10-20

## 2021-10-20 LAB
BUN SERPL-MCNC: 22.2 MG/DL (ref 9.8–20.1)
CALCIUM SERPL-MCNC: 10.8 MG/DL (ref 8.4–10.2)
CHLORIDE SERPL-SCNC: 109 MMOL/L (ref 98–107)
CO2 SERPL-SCNC: 23 MMOL/L (ref 22–29)
CREAT SERPL-MCNC: 1.24 MG/DL (ref 0.55–1.02)
CREAT/UREA NIT SERPL: 18
GLUCOSE SERPL-MCNC: 100 MG/DL (ref 74–100)
POTASSIUM SERPL-SCNC: 5.2 MMOL/L (ref 3.5–5.1)
SODIUM SERPL-SCNC: 136 MMOL/L (ref 136–145)

## 2022-01-24 ENCOUNTER — HISTORICAL (OUTPATIENT)
Dept: NEPHROLOGY | Facility: CLINIC | Age: 57
End: 2022-01-24

## 2022-01-24 LAB
ABS NEUT (OLG): 2.5 X10(3)/MCL (ref 2.1–9.2)
ALBUMIN SERPL-MCNC: 3.7 GM/DL (ref 3.5–5)
ALBUMIN/GLOB SERPL: 0.9 RATIO (ref 1.1–2)
ALP SERPL-CCNC: 75 UNIT/L (ref 40–150)
ALT SERPL-CCNC: 21 UNIT/L (ref 0–55)
APPEARANCE, UA: CLEAR
AST SERPL-CCNC: 28 UNIT/L (ref 5–34)
BACTERIA SPEC CULT: ABNORMAL
BASOPHILS # BLD AUTO: 0.2 X10(3)/MCL (ref 0–0.2)
BASOPHILS NFR BLD AUTO: 2 %
BILIRUB SERPL-MCNC: 0.5 MG/DL
BILIRUB UR QL STRIP: NEGATIVE
BILIRUBIN DIRECT+TOT PNL SERPL-MCNC: 0.2 MG/DL (ref 0–0.5)
BILIRUBIN DIRECT+TOT PNL SERPL-MCNC: 0.3 MG/DL (ref 0–0.8)
BUN SERPL-MCNC: 22.8 MG/DL (ref 9.8–20.1)
CALCIUM SERPL-MCNC: 9.6 MG/DL (ref 8.7–10.5)
CHLORIDE SERPL-SCNC: 108 MMOL/L (ref 98–107)
CO2 SERPL-SCNC: 21 MMOL/L (ref 22–29)
COLOR UR: YELLOW
CREAT SERPL-MCNC: 1.27 MG/DL (ref 0.55–1.02)
CREAT UR-MCNC: >400 MG/DL (ref 45–106)
EOSINOPHIL # BLD AUTO: 0.5 X10(3)/MCL (ref 0–0.9)
EOSINOPHIL NFR BLD AUTO: 8 %
ERYTHROCYTE [DISTWIDTH] IN BLOOD BY AUTOMATED COUNT: 13.1 % (ref 11.5–14.5)
GLOBULIN SER-MCNC: 4.3 GM/DL (ref 2.4–3.5)
GLUCOSE (UA): NORMAL /UL
GLUCOSE SERPL-MCNC: 161 MG/DL (ref 74–100)
HCT VFR BLD AUTO: 38.5 % (ref 35–46)
HGB BLD-MCNC: 12 GM/DL (ref 12–16)
HGB UR QL STRIP: NEGATIVE /HPF
HYALINE CASTS #/AREA URNS LPF: ABNORMAL /LPF
IMM GRANULOCYTES # BLD AUTO: 0.01 10*3/UL
IMM GRANULOCYTES NFR BLD AUTO: 0 %
KETONES UR QL STRIP: ABNORMAL /UL
LEUKOCYTE ESTERASE UR QL STRIP: 250
LYMPHOCYTES # BLD AUTO: 2.9 X10(3)/MCL (ref 0.6–4.6)
LYMPHOCYTES NFR BLD AUTO: 43 %
MCH RBC QN AUTO: 28.6 PG (ref 26–34)
MCHC RBC AUTO-ENTMCNC: 31.2 GM/DL (ref 31–37)
MCV RBC AUTO: 91.7 FL (ref 80–100)
MONOCYTES # BLD AUTO: 0.6 X10(3)/MCL (ref 0.1–1.3)
MONOCYTES NFR BLD AUTO: 10 %
MUCOUS THREADS URNS QL MICRO: ABNORMAL /LPF
NEUTROPHILS # BLD AUTO: 2.5 X10(3)/MCL (ref 2.1–9.2)
NEUTROPHILS NFR BLD AUTO: 37 %
NITRITE UR QL STRIP: NEGATIVE
NRBC BLD AUTO-RTO: 0 % (ref 0–0.2)
PH UR STRIP: 5.5 /UL (ref 4.5–8)
PLATELET # BLD AUTO: 355 X10(3)/MCL (ref 130–400)
PMV BLD AUTO: 9.4 FL (ref 7.4–10.4)
POTASSIUM SERPL-SCNC: 5.5 MMOL/L (ref 3.5–5.1)
PROT SERPL-MCNC: 8 GM/DL (ref 6.4–8.3)
PROT UR QL STRIP: ABNORMAL /UL
PROT UR STRIP-MCNC: 22.8 MG/DL
PROT/CREAT UR-RTO: <57 MG/GM CR
RBC # BLD AUTO: 4.2 X10(6)/MCL (ref 4–5.2)
RBC #/AREA URNS HPF: ABNORMAL /HPF
SODIUM SERPL-SCNC: 137 MMOL/L (ref 136–145)
SP GR UR STRIP: 1.03 (ref 1–1.03)
SQUAMOUS EPITHELIAL, UA: ABNORMAL
URATE SERPL-MCNC: 6.3 MG/DL (ref 2.6–6)
UROBILINOGEN UR STRIP-ACNC: ABNORMAL /HPF
WBC # SPEC AUTO: 6.7 X10(3)/MCL (ref 4.5–11)
WBC #/AREA URNS HPF: ABNORMAL /HPF

## 2022-01-26 LAB — FINAL CULTURE: NO GROWTH

## 2022-02-21 ENCOUNTER — HISTORICAL (OUTPATIENT)
Dept: INTERNAL MEDICINE | Facility: CLINIC | Age: 57
End: 2022-02-21

## 2022-02-21 LAB
APPEARANCE, UA: NORMAL
BACTERIA SPEC CULT: NORMAL
BILIRUB UR QL STRIP: NEGATIVE
BUN SERPL-MCNC: 17.8 MG/DL (ref 9.8–20.1)
CALCIUM SERPL-MCNC: 9.3 MG/DL (ref 8.7–10.5)
CHLORIDE SERPL-SCNC: 105 MMOL/L (ref 98–107)
CO2 SERPL-SCNC: 25 MMOL/L (ref 22–29)
COLOR UR: YELLOW
CREAT SERPL-MCNC: 1.2 MG/DL (ref 0.55–1.02)
CREAT/UREA NIT SERPL: 15
EST. AVERAGE GLUCOSE BLD GHB EST-MCNC: 159.9 MG/DL
GLUCOSE (UA): NORMAL
GLUCOSE SERPL-MCNC: 133 MG/DL (ref 74–100)
HBA1C MFR BLD: 7.2 %
HGB UR QL STRIP: NEGATIVE
HYALINE CASTS #/AREA URNS LPF: NORMAL /[LPF]
ICTERIC INTERF INDEX SERPL-ACNC: 0
KETONES UR QL STRIP: NEGATIVE
LEUKOCYTE ESTERASE UR QL STRIP: 500
LIPEMIC INTERF INDEX SERPL-ACNC: 3
MUCOUS THREADS URNS QL MICRO: NORMAL
NITRITE UR QL STRIP: NEGATIVE
PH UR STRIP: 5.5 [PH] (ref 4.5–8)
POTASSIUM SERPL-SCNC: 4.9 MMOL/L (ref 3.5–5.1)
PROT UR QL STRIP: NEGATIVE
RBC #/AREA URNS HPF: NORMAL /[HPF] (ref 0–5)
SODIUM SERPL-SCNC: 137 MMOL/L (ref 136–145)
SP GR UR STRIP: 1.02 (ref 1–1.03)
SQUAMOUS EPITHELIAL, UA: NORMAL
UROBILINOGEN UR STRIP-ACNC: NORMAL
WBC #/AREA URNS HPF: NORMAL /[HPF] (ref 0–5)

## 2022-04-11 ENCOUNTER — HISTORICAL (OUTPATIENT)
Dept: ADMINISTRATIVE | Facility: HOSPITAL | Age: 57
End: 2022-04-11
Payer: MEDICARE

## 2022-04-14 LAB
LEFT EYE DM RETINOPATHY: POSITIVE
RIGHT EYE DM RETINOPATHY: NEGATIVE

## 2022-04-21 ENCOUNTER — HISTORICAL (OUTPATIENT)
Dept: ADMINISTRATIVE | Facility: HOSPITAL | Age: 57
End: 2022-04-21
Payer: MEDICARE

## 2022-04-21 ENCOUNTER — HISTORICAL (OUTPATIENT)
Dept: RADIOLOGY | Facility: HOSPITAL | Age: 57
End: 2022-04-21
Payer: MEDICARE

## 2022-04-24 VITALS
WEIGHT: 253.31 LBS | BODY MASS INDEX: 46.61 KG/M2 | SYSTOLIC BLOOD PRESSURE: 109 MMHG | DIASTOLIC BLOOD PRESSURE: 70 MMHG | OXYGEN SATURATION: 100 % | HEIGHT: 62 IN

## 2022-04-26 ENCOUNTER — ANCILLARY ORDERS (OUTPATIENT)
Dept: CARDIOLOGY | Facility: HOSPITAL | Age: 57
End: 2022-04-26
Payer: MEDICARE

## 2022-04-26 DIAGNOSIS — R06.09 DOE (DYSPNEA ON EXERTION): ICD-10-CM

## 2022-04-28 DIAGNOSIS — E78.5 HYPERLIPIDEMIA, UNSPECIFIED HYPERLIPIDEMIA TYPE: Primary | ICD-10-CM

## 2022-04-29 NOTE — PROGRESS NOTES
MNT OUTPATIENT EDUCATION   Nutrition Diagnosis  Problem:   Food & Nutrition Related Knowledge Deficit       Related to:  Medical Diagnosis  As Evidenced by:  Limited prior knowledge / health professional referral    Nutrition Prescription / Intervention  MNT Education Completed on:    Diabetes:  Instructed patient on carbohydrate containing food groups (starches, fruits, milk); portion sizes / measuring food; reading food labels; low fat meat selections; choosing healthier fats; increasing activity; sick day management; low fat tips on dining out; good eating habits; cooking healthier meals.       Appropriate meal plan provided: 0080-0339 calories    Healthy Eating:  Instructed patient on heart healthy eating and weight management; low fat, cholesterol, and sodium foods; better food choices; portion sizes; healthy choices when cooking and / or eating out; reading food labels; increasing activity.         Level of Understanding:   good  Expected Compliance:   good  Appropriate Handouts Given: (age specific / literacy): yes  Barriers to Learning: none    Nutrition Prescription:  Diet order prescribed per MD / RD    Goal:  Patient will adhere to prescribed MNT meal plan as instructed by RD    Monitoring / Evaluation    RDEDE will monitor: YANDY

## 2022-04-29 NOTE — PROGRESS NOTES
Patient:   Gloria Denis             MRN: [id number]            FIN: [id number]               Age:   54 years     Sex:  Female     :  1965   Associated Diagnoses:   None   Author:   Danna Williamson MD reviewed: Will discuss with patient during follow up appointment on  2020 at 3:40pm.

## 2022-04-30 DIAGNOSIS — E87.20 HYPERLACTATEMIA: Primary | ICD-10-CM

## 2022-05-05 NOTE — HISTORICAL [facility] CERNER
This is a historical note converted from Robin. Formatting and pictures may have been removed.  Please reference Robin for original formatting and attached multimedia. Chief Complaint  follow up on diabetes and sleep study, and rash on body and scalp (ED visit 9-6-18)  History of Present Illness  52-year-old black female dear for ER follow-up; patient was seen at [facility] ED?on 9/6/18 for flank pain and diagnosed with a UTI. She was prescribed Macrobid (urine culture?suggestive?for contamination). Patient states she did not complete the medication secondary to nausea. No current reports of urinary symptoms.  Previous patient of DANNI Ribera NP. ?Appointment to establish a new PCP scheduled for 1/3/19?in [facility]?internal medicine clinic. Last routine labs 3/18/18 revealed an A1c level of 8.6; patient is on Janumet and Lantus 55 units SQ at night. Reports blood sugars ranging from 100s-200s. She also has a hx of sleep apnea; reports compliance with Bipap use nightly. She reports improvement in daytime energy level and level of alertness.  Review of Systems  GENERAL: Negative except as stated?in HPI  CV: Negative except as stated in HPI  RESP: Negative except as stated?in HPI  GI: Negative?except as stated in?HPI  : Negative?except as stated in?HPI  SKIN: Negative?except as stated in?HPI  Neuro: Negative?except as stated in?HPI  MS: Negative?except as stated in?HPI  Psych: Negative?except as stated in?HPI  Physical Exam  Vitals & Measurements  T:?36.7? ?C (Oral)? HR:?89(Peripheral)? RR:?20? BP:?110/76? SpO2:?99%?  HT:?155?cm? HT:?155?cm? WT:?120.1?kg? WT:?120.1?kg? BMI:?49.99?  Vital Signs reviewed  GENERAL: Awake and alert; no acute distress. Obese.  EYES: Pupils round,?equal and?reactive to light. Extraocular movements intact. No exophthalmos.  HENT: Normocephalic. Normal appearing oral cavity and posterior pharynx.  CV: Normal rate and rhythm. No murmur or JVD. No edema. Normal peripheral pulses and perfusion.  RESP:  Respirations even and nonlabored. BBS clear to auscultation.  MUSCULOSKELETAL: Full passive and active ROM of all joints. No bony deformities,?joint tenderness or swelling.?Normal gait. No CVA tenderness.  NEURO: Awake, alert and oriented to person, place, time and situation. Normal speech pattern. ?No focal or sensory?deficits noted.  PSYCH: Appropriate mood and affect; cooperative.  Assessment/Plan  1.?Flank pain,?Flank pain  Patient did not complete course of antibiotics; denies urinary symptoms. Repeat urine C&S today. Increase fluid intake.  Ordered:  Urine Culture [id number], Routine collect, 18 12:06:00 CDT, Urine, Nurse collect, Stop date 18 12:07:00 CDT, Flank pain  ?  2.?Sleep apnea  Patient reports compliance with Bipap nightly with improvement in daytime energy level and alertness. Patient to continue with nightly?Bipap usage. Keep scheduled appointment with?[facility] in 2019 to establish new PCP.  ?  3.?Obesity  ?Obesity education at discharge.  ?  4.?Sciatica  Refilled Diclofenac per patients request. Keep scheduled appointment with?[facility] in 2019 to establish new PCP. ?  ?  Orders:  diclofenac, 75 mg = 1 tab(s), Oral, BID, PRN PRN as needed for pain, # 60 tab(s), 1 Refill(s), Pharmacy: [id number]   Problem List/Past Medical History  Ongoing  Anxiety  Chronic back pain  Depression  Diabetes mellitus  Diabetic neuropathy  Dyslipidemia  Hypertension  Knowledge deficit  Obesity  Sciatica  Sleep apnea  Historical  No qualifying data  Procedure/Surgical History   section ()  Cholecystectomy ()  Arthroscopic removal of loose body from joint  evangelina knee scope   section  Drainage of oral abscess  Exploration using laparoscope   Medications  acetaminophen-codeine 300 mg-30 mg oral tablet., 1 tab(s), Oral, q6hr, PRN,? ?Not taking  baclofen 10 mg oral tablet, 10 mg= 1 tab(s), Oral, TID, PRN,? ?Not taking  Basaglar KwikPen 100 units/mL subcutaneous solution, 55  units, Subcutaneous, Daily, 3 refills,? ?Not Taking per Prescriber  Kelly Contour Next Lancets, See Instructions, 11 refills  Kelly Contour Next test strips, See Instructions, 11 refills  BETAMETH DIP LOT 0.05%, 1 salvatore, TOP, BID,? ?Not Taking, Completed Rx  CLOTRIMAZOLE CRE 1%  cyclobenzaprine 10 mg oral tablet, See Instructions, 2 refills  Cymbalta 20 mg oral delayed release capsule, 20 mg= 1 cap(s), Oral, BID, 3 refills  diclofenac sodium 75 mg oral delayed release tablet, 75 mg= 1 tab(s), Oral, BID, PRN, 1 refills  diclofenac sodium 75 mg oral delayed release tablet, 75 mg= 1 tab(s), Oral, BID, PRN,? ?Not Taking, Completed Rx  DULoxetine 30 mg oral delayed release capsule, 30 mg= 1 cap(s), Oral, BID, 6 refills  enalapril 5 mg oral tablet, See Instructions, 3 refills  gabapentin 800 mg oral tablet, 800 mg= 1 tab(s), Oral, TID, 6 refills  Insulin pen needle, See Instructions, 11 refills  Janumet 50 mg-1000 mg oral tablet, 1 tab(s), Oral, BID, 3 refills  Lantus Solostar Pen 100 units/mL subcutaneous solution, 55 units, Subcutaneous, Daily, 3 refills  metoprolol tartrate 25 mg oral tablet, 25 mg= 1 tab(s), Oral, BID, 6 refills  Nasonex 50 mcg/inh nasal spray, 1 spray(s), Nasal, BID, 5 refills,? ?Not Taking, Completed Rx  Premarin 0.625 mg/g vaginal cream with applicator, 1 gm, VAG, qPM, 1 refills  simvastatin 80 mg oral tablet, 80 mg= 1 tab(s), Oral, Once a day (at bedtime), 6 refills  Allergies  aspirin  Social History  Alcohol  Never, 10/17/2015  Employment/School  Unemployed, Highest education level: High school., 04/20/2015  Exercise - Does not exercise, 04/20/2015  Self assessment: Fair condition., 04/20/2015  Home/Environment  Lives with Spouse. Living situation: Home/Independent. Home equipment: Glucose monitoring. Alcohol abuse in household: No. Substance abuse in household: No. Smoker in household: No. Injuries/Abuse/Neglect in household: No. Feels unsafe at home: No. Safe place to go: Yes. Family/Friends  available for support: Yes. Concern for family members at home: Yes. Major illness in household: Yes. Risks in environment: Pets/Animal exposure., 04/20/2015  Nutrition/Health  Regular, Wants to lose weight: Yes. Sleeping concerns: Yes. Feels highly stressed: No., 04/20/2015  Other  Sexual  Substance Abuse  Never, 10/17/2015  Tobacco  Never smoker Use:., 08/06/2018  Family History  Breast cancer: Mother.  Diabetes mellitus type 2: Father.  Heart attack: Father.  Heart disease: Mother.  Kidney failure: Father.  Leukemia: Brother.  Stroke: Father.  Immunizations  Vaccine Date Status   tetanus/diphtheria/pertussis, acel(Tdap) 05/19/2017 Given   Health Maintenance  Health Maintenance  ???Pending?(in the next year)  ??? ??OverDue  ??? ? ? ?Diabetes Maintenance-Eye Exam due??06/27/17??and every 1??year(s)  ??? ? ? ?Diabetes Maintenance-Foot Exam due??01/17/18??and every 1??year(s)  ??? ? ? ?Colorectal Screening due??05/22/18??and every 1??year(s)  ??? ??Due?  ??? ? ? ?ADL Screening due??09/26/18??and every 1??year(s)  ??? ? ? ?Influenza Vaccine due??09/26/18??and every?  ??? ??Due In Future?  ??? ? ? ?Diabetes Maintenance-Fasting Lipid Profile not due until??03/23/19??and every 1??year(s)  ??? ? ? ?Diabetes Maintenance-HgbA1c not due until??03/23/19??and every 1??year(s)  ??? ? ? ?Alcohol Misuse Screening not due until??03/29/19??and every 1??year(s)  ??? ? ? ?Hypertension Management-BMP not due until??09/06/19??and every 1??year(s)  ???Satisfied?(in the past 1 year)  ??? ??Satisfied?  ??? ? ? ?Alcohol Misuse Screening on??03/29/18.??Satisfied by Adelina Brody  ??? ? ? ?Blood Pressure Screening on??09/26/18.??Satisfied by Adeel Cheek LPN  ??? ? ? ?Body Mass Index Check on??09/26/18.??Satisfied by Adeel Cheek LPN  ??? ? ? ?Breast Cancer Screening on??08/29/18.??Satisfied by Danielle Lee  ??? ? ? ?Depression Screening on??09/26/18.??Satisfied by Adeel Cheek LPN  ??? ? ? ?Diabetes  Maintenance-Fasting Lipid Profile on??03/23/18.??Satisfied by Cal Cadena Jr.  ??? ? ? ?Diabetes Maintenance-HgbA1c on??06/29/18.??Satisfied by Adelina Brody  ??? ? ? ?Diabetes Screening on??09/06/18.??Satisfied by Lakeisha Fuentes  ??? ? ? ?Hypertension Management-Blood Pressure on??09/26/18.??Satisfied by Adeel Cheek LPN  ??? ? ? ?Influenza Vaccine on??09/26/18.??Satisfied by Adeel Cheek LPN  ??? ? ? ?Lipid Screening on??03/23/18.??Satisfied by Cal Cadena Jr.  ??? ? ? ?Obesity Screening on??09/26/18.??Satisfied by Adeel Cheek LPN  ??? ? ? ?Smoking Cessation (Coronary Artery Disease) on??04/18/18.??Satisfied by Zaida Interiano RN  ??? ? ? ?Smoking Cessation (Diabetes) on??04/18/18.??Satisfied by Zaida Interiano RN  ??? ??Refused?  ??? ? ? ?Influenza Vaccine on??03/29/18.??Recorded for Adelina Brody??Reason: Patient Refuses  ?  ?

## 2022-05-05 NOTE — HISTORICAL [facility] CERNER
This is a historical note converted from Robin. Formatting and pictures may have been removed.  Please reference Robin for original formatting and attached multimedia. Chief Complaint  Need pcp  History of Present Illness  53 year old  female presents for follow up appointment; was seeing NP Adelina Ribera. She has a past medical history of hypertension, type 2 diabetes mellitus, diabetic neuropathy,?depression,?and Yolanda-Yolanda disease.? Patient?is well-established in the dermatology clinic at this facility for treatment of her Yolanda-Yolanda disease.? Would like refills on her medications today.  Complaining of bilateral hip pain which has been ongoing for quite some time now.? Denies history of trauma. ?She does ambulate with a quad cane.  Due for mammogram in August; denies family history of colorectal cancer and is amenable to FIT screening at this time.  Review of Systems  ????Constitutional: No fever, No chills, No weakness, No fatigue.  ?????Eye: No recent visual problem.  ?????Respiratory: No shortness of breath, No cough, No sputum production, No wheezing.  ?????Cardiovascular: No chest pain, No palpitations, No peripheral edema.  ?????Gastrointestinal: No nausea, No vomiting, No diarrhea, No constipation, No heartburn, No abdominal pain.  ?????Genitourinary: No dysuria.  ?????Endocrine: No excessive thirst, No polyuria, No cold intolerance, No heat intolerance.  ?????Musculoskeletal: No back pain, hip pain, No decreased range of motion.  ?????Integumentary: No rash, No pruritus.  ?????Neurologic: Alert and oriented X4, No numbness, No tingling, No headache.  ?????Psychiatric: No anxiety, No depression.  Physical Exam  Vitals & Measurements  T:?37.2? ?C (Oral)? HR:?89(Peripheral)? RR:?18? BP:?109/73?  HT:?157?cm? WT:?118.2?kg? BMI:?47.95?  General: Alert and oriented, No acute distress.  ?????Eye: Pupils are equal, round and reactive to light, Extraocular movements are intact, Normal  conjunctiva.  ?????HENT: Normocephalic, Oral mucosa is moist, No pharyngeal erythema.  ?????Neck: Supple, Non-tender.  ?????Respiratory: Lungs are clear to auscultation, Respirations are non-labored.  ?????Cardiovascular: Normal rate, Regular rhythm, No murmur, Good pulses equal in all extremities, No edema.  ?????Gastrointestinal: Soft, Non-tender, Non-distended, Normal bowel sounds.  ?????Musculoskeletal: Normal range of motion, Normal strength.? Ambulates with quad cane.  ?????Integumentary: Warm, Dry.  ?????Neurologic: Alert, Oriented.  ?????Cognition and Speech: Oriented, Speech clear and coherent.  ?????Psychiatric: Cooperative, Appropriate mood & affect.  Assessment/Plan  Orders:  Basic Metabolic Panel, Routine collect, *Est. 06/11/19 3:00:00 CDT, Blood, Order for future visit, *Est. Stop date 06/11/19 3:00:00 CDT, Lab Collect, Type 2 diabetes mellitus, 06/11/19 12:39:00 CDT  CBC w/ Auto Diff, Routine collect, *Est. 09/11/19 3:00:00 CDT, Blood, Order for future visit, *Est. Stop date 09/11/19 3:00:00 CDT, Lab Collect, Wellness examination  Hypertension  Screening cholesterol level  Type 2 diabetes mellitus, 06/11/19 12:39:00 CDT  Clinic Follow up, *Est. 09/11/19 3:00:00 CDT, FASTING labs with Vonnie, Order for future visit, Wellness examination  Hypertension  Screening cholesterol level  Type 2 diabetes mellitus, [facility]  Clinic Follow up, *Est. 09/18/19 3:00:00 CDT, Order for future visit, Wellness examination  Hypertension  Screening cholesterol level  Type 2 diabetes mellitus, [facility]  Comprehensive Metabolic Panel, Routine collect, *Est. 09/11/19 3:00:00 CDT, Blood, Order for future visit, *Est. Stop date 09/11/19 3:00:00 CDT, Lab Collect, Wellness examination  Hypertension  Screening cholesterol level  Type 2 diabetes mellitus, 06/11/19 12:39:00 CDT  Hemoglobin A1C [facility], Routine collect, *Est. 06/11/19 3:00:00 CDT, Blood, Order for future visit, *Est. Stop date 06/11/19 3:00:00  CDT, Lab Collect, Type 2 diabetes mellitus, 06/11/19 12:39:00 CDT  Hemoglobin A1C [facility], Routine collect, *Est. 09/11/19 3:00:00 CDT, Blood, Order for future visit, *Est. Stop date 09/11/19 3:00:00 CDT, Lab Collect, Wellness examination  Hypertension  Screening cholesterol level  Type 2 diabetes mellitus, 06/11/19 12:39:00 CDT  Lipid Panel, Routine collect, *Est. 09/11/19 3:00:00 CDT, Blood, Order for future visit, *Est. Stop date 09/11/19 3:00:00 CDT, Lab Collect, Wellness examination  Hypertension  Screening cholesterol level  Type 2 diabetes mellitus, 06/11/19 12:39:00 CDT  MG Screening, Routine, *Est. 08/11/19 3:00:00 CDT, Screening, None, Ambulatory, Rad Type, Order for future visit, Breast cancer screening, Schedule this test, [facility], Follow Breast Imaging Order Set Protocol, *Est. 08/11/19 3:00:00 CDT  Microalbum/Creatinine Ratio Urine (Microalb/Creat), Routine collect, Urine, Order for future visit, *Est. 09/11/19 3:00:00 CDT, *Est. Stop date 09/11/19 3:00:00 CDT, Nurse collect, Wellness examination  Hypertension  Screening cholesterol level  Type 2 diabetes mellitus  ?  1. ?Type 2 diabetes mellitus:?HbA1c and BMP today.  Lantus 55 units SQ QHS and Janumet 50-100mg PO BID with meals.  2.? Hypertension: Stable on?metoprolol 25 mg p.o. twice daily and enalapril?5 mg p.o. daily.  3.? Sinus tachycardia:?Stable on metoprolol; keep scheduled follow-up appointment?in cardiology clinic on?June 13, 2019.  4.? Wellness, screening cholesterol: Fasting labs one week prior to follow-up appointment in 3 months.  5.? Bilateral hip pain: X-rays today.  6.? Diabetic neuropathy: Stable on gabapentin.  7.? Depression: Stable on Cymbalta.  ?   Screening: Mammogram:?Ordered to be done?in August 2019.  ??????????????? Pelvic exam:?Keep scheduled appointment in gynecology clinic on August 8, 2019.  ??????????????? Colon cancer screening:? FIT within 4 weeks.  ?   Patient voiced understanding.    Problem List/Past Medical History  Ongoing  Anxiety  Chronic back pain  Depression  Diabetes mellitus  Diabetic neuropathy  Dyslipidemia  Hypertension  Knowledge deficit  Obesity  Sciatica  Sleep apnea  Historical  No qualifying data  Procedure/Surgical History   section ()  Cholecystectomy ()  Arthroscopic removal of loose body from joint  evangelina knee scope   section  Drainage of oral abscess  Exploration using laparoscope   Medications  CLINDAMYCIN LOT 10MG/ML, 1 salvatore, TOP, BID  clotrimazole 1% topical cream, 1 salvatore, TOP, BID  cyclobenzaprine 10 mg oral tablet, 10 mg= 1 tab(s), Oral, TID, 2 refills  DULoxetine 30 mg oral delayed release capsule, 30 mg= 1 cap(s), Oral, Daily, 2 refills  enalapril 5 mg oral tablet, 5 mg= 1 tab(s), Oral, Daily,? ?Still taking, not as prescribed: States takes only 1/2 tablet a day  gabapentin 800 mg oral tablet, 800 mg= 1 tab(s), Oral, TID, 2 refills  HYDROCORT LOT 2.5%  Janumet 50 mg-1000 mg oral tablet, 1 tab(s), Oral, BID  Lantus 100 U/mL (per 1 unit), Subcutaneous  METOPROL TAR TAB 25MG, 25 mg= 1 tab(s), Oral, BID  Premarin 0.625 mg/g vaginal cream with applicator, 1 gm, VAG, qMTW,? ?Not taking  Allergies  aspirin  Social History  Alcohol  Never, 10/17/2015  Employment/School  Unemployed, Highest education level: High school., 2015  Exercise - Does not exercise, 2015  Self assessment: Fair condition., 2015  Home/Environment  Lives with Spouse. Living situation: Home/Independent. Home equipment: Glucose monitoring. Alcohol abuse in household: No. Substance abuse in household: No. Smoker in household: No. Injuries/Abuse/Neglect in household: No. Feels unsafe at home: No. Safe place to go: Yes. Family/Friends available for support: Yes. Concern for family members at home: Yes. Major illness in household: Yes. Risks in environment: Pets/Animal exposure., 2015  Nutrition/Health  Regular, Wants to lose weight: Yes. Sleeping concerns: Yes. Feels  highly stressed: No., 04/20/2015  Other  Sexual  Substance Use  Never, 10/17/2015  Tobacco  Never (less than 100 in lifetime), N/A, 05/29/2019  Never smoker, N/A, 12/13/2018  Family History  Breast cancer: Mother.  Diabetes mellitus type 2: Father.  Heart attack: Father.  Heart disease: Mother.  Kidney failure: Father.  Leukemia: Brother.  Stroke: Father.  Immunizations  Vaccine Date Status   tetanus/diphtheria/pertussis, acel(Tdap) 05/19/2017 Given   Health Maintenance  Health Maintenance  ???Pending?(in the next year)  ??? ??OverDue  ??? ? ? ?Diabetes Maintenance-Eye Exam due??06/27/17??and every 1??year(s)  ??? ? ? ?Diabetes Maintenance-Foot Exam due??01/17/18??and every 1??year(s)  ??? ? ? ?Colorectal Screening due??05/22/18??and every 1??year(s)  ??? ? ? ?Alcohol Misuse Screening due??01/01/19??and every 1??year(s)  ??? ? ? ?Diabetes Maintenance-Fasting Lipid Profile due??03/23/19??and every 1??year(s)  ??? ? ? ?Diabetes Maintenance-HgbA1c due??03/23/19??and every 1??year(s)  ??? ??Due?  ??? ? ? ?ADL Screening due??06/11/19??and every 1??year(s)  ??? ??Due In Future?  ??? ? ? ?Hypertension Management-BMP not due until??09/06/19??and every 1??year(s)  ??? ? ? ?Blood Pressure Screening not due until??12/13/19??and every 1??year(s)  ??? ? ? ?Hypertension Management-Blood Pressure not due until??12/13/19??and every 1??year(s)  ??? ? ? ?Obesity Screening not due until??01/01/20??and every 1??year(s)  ??? ? ? ?Body Mass Index Check not due until??05/28/20??and every 1??year(s)  ???Satisfied?(in the past 1 year)  ??? ??Satisfied?  ??? ? ? ?Blood Pressure Screening on??05/29/19.??Satisfied by Zaida Interiano RN  ??? ? ? ?Body Mass Index Check on??05/29/19.??Satisfied by Zaida Interiano RN  ??? ? ? ?Breast Cancer Screening on??08/29/18.??Satisfied by Danielle Lee  ??? ? ? ?Depression Screening on??11/08/18.??Satisfied by Robyn Mathur LPN  ??? ? ? ?Diabetes Screening on??09/06/18.??Satisfied  by Lakeisha Fuentes  ??? ? ? ?Hypertension Management-Blood Pressure on??05/29/19.??Satisfied by Zaida Interiano RN  ??? ? ? ?Influenza Vaccine on??12/13/18.??Satisfied by Jeannine Bruce RN  ??? ? ? ?Obesity Screening on??05/29/19.??Satisfied by Zaida Interiano RN  ?  ?      HbA1c is 8.7%. ?Refills have been sent to pharmacy.

## 2022-05-12 DIAGNOSIS — Z12.31 SCREENING MAMMOGRAM FOR BREAST CANCER: Primary | ICD-10-CM

## 2022-05-16 ENCOUNTER — HOSPITAL ENCOUNTER (OUTPATIENT)
Dept: RADIOLOGY | Facility: HOSPITAL | Age: 57
Discharge: HOME | End: 2022-05-16
Attending: NURSE PRACTITIONER
Payer: MEDICARE

## 2022-05-16 DIAGNOSIS — Z12.31 SCREENING MAMMOGRAM FOR BREAST CANCER: ICD-10-CM

## 2022-05-16 PROCEDURE — 77067 MAMMO DIGITAL SCREENING BILAT: ICD-10-PCS | Mod: 26,,, | Performed by: RADIOLOGY

## 2022-05-16 PROCEDURE — 77067 SCR MAMMO BI INCL CAD: CPT | Mod: 26,,, | Performed by: RADIOLOGY

## 2022-05-16 PROCEDURE — 77067 SCR MAMMO BI INCL CAD: CPT | Mod: TC

## 2022-06-06 DIAGNOSIS — N18.9 CHRONIC KIDNEY DISEASE, UNSPECIFIED CKD STAGE: Primary | ICD-10-CM

## 2022-06-08 RX ORDER — ENALAPRIL MALEATE 2.5 MG/1
2.5 TABLET ORAL
COMMUNITY
Start: 2021-10-19 | End: 2022-06-08 | Stop reason: SDUPTHER

## 2022-06-08 RX ORDER — SODIUM ZIRCONIUM CYCLOSILICATE 10 G/10G
POWDER, FOR SUSPENSION ORAL
COMMUNITY
Start: 2022-02-22 | End: 2022-06-09 | Stop reason: SDUPTHER

## 2022-06-08 RX ORDER — ENALAPRIL MALEATE 2.5 MG/1
2.5 TABLET ORAL DAILY
Qty: 90 TABLET | Refills: 1 | Status: SHIPPED | OUTPATIENT
Start: 2022-06-08 | End: 2023-02-01 | Stop reason: SDUPTHER

## 2022-06-09 RX ORDER — SODIUM ZIRCONIUM CYCLOSILICATE 10 G/10G
POWDER, FOR SUSPENSION ORAL
Qty: 45 PACKET | Refills: 0 | Status: SHIPPED | OUTPATIENT
Start: 2022-06-09 | End: 2023-04-06 | Stop reason: SDUPTHER

## 2022-06-09 RX ORDER — SODIUM ZIRCONIUM CYCLOSILICATE 10 G/10G
10 POWDER, FOR SUSPENSION ORAL EVERY OTHER DAY
Qty: 45 PACKET | Refills: 0 | Status: SHIPPED | OUTPATIENT
Start: 2022-06-09 | End: 2022-06-22 | Stop reason: SDUPTHER

## 2022-06-16 ENCOUNTER — TELEPHONE (OUTPATIENT)
Dept: NEPHROLOGY | Facility: CLINIC | Age: 57
End: 2022-06-16
Payer: MEDICARE

## 2022-06-16 NOTE — TELEPHONE ENCOUNTER
Informed of upcoming appointment and lab work to be completed. Informed to bring all current medications to appointment.

## 2022-06-22 ENCOUNTER — OFFICE VISIT (OUTPATIENT)
Dept: NEPHROLOGY | Facility: CLINIC | Age: 57
End: 2022-06-22
Payer: MEDICARE

## 2022-06-22 ENCOUNTER — LAB VISIT (OUTPATIENT)
Dept: LAB | Facility: HOSPITAL | Age: 57
End: 2022-06-22
Attending: NURSE PRACTITIONER
Payer: MEDICARE

## 2022-06-22 VITALS
HEART RATE: 84 BPM | DIASTOLIC BLOOD PRESSURE: 74 MMHG | TEMPERATURE: 98 F | SYSTOLIC BLOOD PRESSURE: 109 MMHG | BODY MASS INDEX: 44.12 KG/M2 | RESPIRATION RATE: 20 BRPM | WEIGHT: 233.69 LBS | OXYGEN SATURATION: 100 % | HEIGHT: 61 IN

## 2022-06-22 DIAGNOSIS — I10 HYPERTENSION, UNSPECIFIED TYPE: ICD-10-CM

## 2022-06-22 DIAGNOSIS — N18.31 CKD STAGE G3A/A1, GFR 45-59 AND ALBUMIN CREATININE RATIO <30 MG/G: Primary | ICD-10-CM

## 2022-06-22 DIAGNOSIS — E66.01 CLASS 3 SEVERE OBESITY WITH BODY MASS INDEX (BMI) OF 45.0 TO 49.9 IN ADULT, UNSPECIFIED OBESITY TYPE, UNSPECIFIED WHETHER SERIOUS COMORBIDITY PRESENT: ICD-10-CM

## 2022-06-22 DIAGNOSIS — E87.5 HYPERKALEMIA: ICD-10-CM

## 2022-06-22 DIAGNOSIS — E78.5 DYSLIPIDEMIA: ICD-10-CM

## 2022-06-22 DIAGNOSIS — D63.8 ANEMIA OF CHRONIC DISEASE: ICD-10-CM

## 2022-06-22 DIAGNOSIS — N18.9 CHRONIC KIDNEY DISEASE, UNSPECIFIED CKD STAGE: ICD-10-CM

## 2022-06-22 DIAGNOSIS — E11.9 DM (DIABETES MELLITUS): Primary | ICD-10-CM

## 2022-06-22 PROBLEM — G89.29 CHRONIC BACK PAIN: Status: ACTIVE | Noted: 2022-06-22

## 2022-06-22 PROBLEM — E66.9 OBESITY: Status: ACTIVE | Noted: 2022-06-22

## 2022-06-22 PROBLEM — G47.30 SLEEP APNEA: Status: ACTIVE | Noted: 2022-06-22

## 2022-06-22 PROBLEM — E11.40 DIABETIC NEUROPATHY: Status: ACTIVE | Noted: 2022-06-22

## 2022-06-22 PROBLEM — M54.30 SCIATICA: Status: ACTIVE | Noted: 2022-06-22

## 2022-06-22 PROBLEM — M54.9 CHRONIC BACK PAIN: Status: ACTIVE | Noted: 2022-06-22

## 2022-06-22 PROBLEM — F41.8 MIXED ANXIETY DEPRESSIVE DISORDER: Status: ACTIVE | Noted: 2022-06-22

## 2022-06-22 LAB
ALBUMIN SERPL-MCNC: 3.7 GM/DL (ref 3.5–5)
ALBUMIN/GLOB SERPL: 0.9 RATIO (ref 1.1–2)
ALP SERPL-CCNC: 68 UNIT/L (ref 40–150)
ALT SERPL-CCNC: 15 UNIT/L (ref 0–55)
AST SERPL-CCNC: 24 UNIT/L (ref 5–34)
BASOPHILS # BLD AUTO: 0.14 X10(3)/MCL (ref 0–0.2)
BASOPHILS NFR BLD AUTO: 2 %
BILIRUBIN DIRECT+TOT PNL SERPL-MCNC: 0.4 MG/DL
BUN SERPL-MCNC: 14.9 MG/DL (ref 9.8–20.1)
CALCIUM SERPL-MCNC: 9.5 MG/DL (ref 8.4–10.2)
CHLORIDE SERPL-SCNC: 111 MMOL/L (ref 98–107)
CHOLEST SERPL-MCNC: 155 MG/DL
CHOLEST/HDLC SERPL: 5 {RATIO} (ref 0–5)
CO2 SERPL-SCNC: 21 MMOL/L (ref 22–29)
CREAT SERPL-MCNC: 1.49 MG/DL (ref 0.55–1.02)
EOSINOPHIL # BLD AUTO: 0.54 X10(3)/MCL (ref 0–0.9)
EOSINOPHIL NFR BLD AUTO: 7.8 %
ERYTHROCYTE [DISTWIDTH] IN BLOOD BY AUTOMATED COUNT: 13.4 % (ref 11.5–17)
EST. AVERAGE GLUCOSE BLD GHB EST-MCNC: 148.5 MG/DL
GLOBULIN SER-MCNC: 3.9 GM/DL (ref 2.4–3.5)
GLUCOSE SERPL-MCNC: 149 MG/DL (ref 74–100)
HBA1C MFR BLD: 6.8 %
HCT VFR BLD AUTO: 35.7 % (ref 37–47)
HDLC SERPL-MCNC: 34 MG/DL (ref 35–60)
HGB BLD-MCNC: 11.4 GM/DL (ref 12–16)
IMM GRANULOCYTES # BLD AUTO: 0.02 X10(3)/MCL (ref 0–0.02)
IMM GRANULOCYTES NFR BLD AUTO: 0.3 % (ref 0–0.43)
LDLC SERPL CALC-MCNC: 88 MG/DL (ref 50–140)
LYMPHOCYTES # BLD AUTO: 2.43 X10(3)/MCL (ref 0.6–4.6)
LYMPHOCYTES NFR BLD AUTO: 35 %
MCH RBC QN AUTO: 28.9 PG (ref 27–31)
MCHC RBC AUTO-ENTMCNC: 31.9 MG/DL (ref 33–36)
MCV RBC AUTO: 90.4 FL (ref 80–94)
MONOCYTES # BLD AUTO: 0.61 X10(3)/MCL (ref 0.1–1.3)
MONOCYTES NFR BLD AUTO: 8.8 %
NEUTROPHILS # BLD AUTO: 3.2 X10(3)/MCL (ref 2.1–9.2)
NEUTROPHILS NFR BLD AUTO: 46.1 %
NRBC BLD AUTO-RTO: 0 %
PLATELET # BLD AUTO: 335 X10(3)/MCL (ref 130–400)
PMV BLD AUTO: 9.9 FL (ref 9.4–12.4)
POTASSIUM SERPL-SCNC: 4.3 MMOL/L (ref 3.5–5.1)
PROT SERPL-MCNC: 7.6 GM/DL (ref 6.4–8.3)
RBC # BLD AUTO: 3.95 X10(6)/MCL (ref 4.2–5.4)
SODIUM SERPL-SCNC: 143 MMOL/L (ref 136–145)
TRIGL SERPL-MCNC: 163 MG/DL (ref 37–140)
TSH SERPL-ACNC: 1.31 UIU/ML (ref 0.35–4.94)
VLDLC SERPL CALC-MCNC: 33 MG/DL
WBC # SPEC AUTO: 6.9 X10(3)/MCL (ref 4.5–11.5)

## 2022-06-22 PROCEDURE — 85025 COMPLETE CBC W/AUTO DIFF WBC: CPT

## 2022-06-22 PROCEDURE — 99215 OFFICE O/P EST HI 40 MIN: CPT | Mod: PBBFAC | Performed by: NURSE PRACTITIONER

## 2022-06-22 PROCEDURE — 83036 HEMOGLOBIN GLYCOSYLATED A1C: CPT

## 2022-06-22 PROCEDURE — 84443 ASSAY THYROID STIM HORMONE: CPT

## 2022-06-22 PROCEDURE — 36415 COLL VENOUS BLD VENIPUNCTURE: CPT

## 2022-06-22 PROCEDURE — 99214 PR OFFICE/OUTPT VISIT, EST, LEVL IV, 30-39 MIN: ICD-10-PCS | Mod: S$PBB,,, | Performed by: NURSE PRACTITIONER

## 2022-06-22 PROCEDURE — 99214 OFFICE O/P EST MOD 30 MIN: CPT | Mod: S$PBB,,, | Performed by: NURSE PRACTITIONER

## 2022-06-22 PROCEDURE — 80061 LIPID PANEL: CPT

## 2022-06-22 PROCEDURE — 80053 COMPREHEN METABOLIC PANEL: CPT

## 2022-06-22 NOTE — PROGRESS NOTES
"[facility] Nephrology Clinic Note  Chief Complaint   Patient presents with    Chronic Kidney Disease     Follow up      History of Present Illness  Ms. Denis is a 56-year-old -American female with past medical history of chronic kidney disease, persistent hyperkalemia, diabetes mellitus type 2 (diagnosed in her late 30s), hypertension, dyslipidemia, sleep apnea, anxiety, depression, chronic back pain, GERD, chronic constipation and morbid obesity. Patient presents for follow-up appointment in nephrology clinic, tells me she is not always compliant with Lokelma.  Denies complaints.    Review of Systems  Twelve point review of systems conducted, negative except as stated in history of present illness.    Review of patient's allergies indicates:   Allergen Reactions    Aspirin        Past Medical History:   Past Medical History:   Diagnosis Date    Anxiety disorder, unspecified     Chronic constipation     CKD (chronic kidney disease)     Depression     DM (diabetes mellitus)     Dyslipidemia     GERD (gastroesophageal reflux disease)     HTN (hypertension)     Morbid obesity     Sleep apnea, unspecified        Procedure History:   Past Surgical History:   Procedure Laterality Date    ARTHROSCOPIC REMOVAL OF LOOSE BODY FROM JOINT       SECTION      CHOLECYSTECTOMY      Drainage of oral abscess      Exploration using laparoscope         Family History: family history includes Cancer in her brother and mother; Diabetes in her father; Heart attack in her father; Heart disease in her mother; Kidney failure in her father; Stroke in her father.    Social History:  reports that she has never smoked. She has never used smokeless tobacco. She reports that she does not drink alcohol and does not use drugs.    Physical Exam:   /74 (BP Location: Right arm, Patient Position: Sitting, BP Method: Large (Automatic))   Pulse 84   Temp 97.9 °F (36.6 °C) (Oral)   Resp 20   Ht 5' 1" (1.549 m)   Wt 106 " "kg (233 lb 11 oz)   SpO2 100%   BMI 44.15 kg/m²  Body mass index is 44.15 kg/m².  General appearance: Patient is in no acute distress.  HEENT: PERRLA, EOMI, no scleral icterus, no JVD. Neck is supple.  Chest: Respirations are unlabored. Lungs sounds are clear.   Heart: S1, S2.   Abdomen: Benign. Obese  : Deferred.  Extremities: No edema, peripheral pulses are palpable.   Neuro: No focal deficits.     Home Medications:  Current Outpatient Medications   Medication Sig    atorvastatin (LIPITOR) 40 MG tablet Take 40 mg by mouth once daily.    BD CAYLA 2ND GEN PEN NEEDLE 32 gauge x 5/32" Ndle SMARTSIG:Injection Every Night    betamethasone dipropionate 0.05 % cream   See Instructions, APPLY  CREAM TOPICALLY TWICE DAILY, # 60 gm, 0 Refill(s), Pharmacy: [id number], 155, cm, Height/Length Dosing, 21 8:39:00 CDT, 112, kg, Weight Dosing, 21 11:11:00 CDT    conjugated estrogens (PREMARIN) vaginal cream   See Instructions, Apply dime size amout to vaginal wall with a gloved finger x1 week then use 2x/week going forward. Do not use with intecourse., # 30 gm, 2 Refill(s), Pharmacy: [facility], 155, cm, Height/Length Dosing, 21...    DULoxetine (CYMBALTA) 30 MG capsule Take 30 mg by mouth 2 (two) times daily.    enalapril (VASOTEC) 2.5 MG tablet Take 1 tablet (2.5 mg total) by mouth once daily.    hydrOXYzine pamoate (VISTARIL) 25 MG Cap Take 25 mg by mouth 3 (three) times daily as needed.    insulin detemir U-100 (LEVEMIR FLEXTOUCH U-100 INSULN) 100 unit/mL (3 mL) InPn pen Inject into the skin.    JARDIANCE 10 mg tablet Take 10 mg by mouth every morning.    LINZESS 290 mcg Cap capsule Take 290 mcg by mouth once daily.    LOKELMA 10 gram packet SMARTSI Packet(s) By Mouth 3 Times a Week    LOKELMA 10 gram packet Take 1 packet (10 g total) by mouth every other day. Mix entire contents of packet(s) into drinking glass containing 3 tablespoons of water; stir well and " drink immediately. Add water and repeat until no powder remains to receive entire dose.    metoprolol tartrate (LOPRESSOR) 25 MG tablet Take 25 mg by mouth 2 (two) times daily.    omeprazole (PRILOSEC) 40 MG capsule Take 40 mg by mouth once daily.    SITagliptan-metformin (JANUMET) 50-1,000 mg per tablet Take 1 tablet by mouth 2 (two) times a day.     Laboratory Data:   Recent Results (from the past 336 hour(s))   Comprehensive Metabolic Panel    Collection Time: 06/22/22  8:06 AM   Result Value Ref Range    Sodium Level 143 136 - 145 mmol/L    Potassium Level 4.3 3.5 - 5.1 mmol/L    Chloride 111 (H) 98 - 107 mmol/L    Carbon Dioxide 21 (L) 22 - 29 mmol/L    Glucose Level 149 (H) 74 - 100 mg/dL    Blood Urea Nitrogen 14.9 9.8 - 20.1 mg/dL    Creatinine 1.49 (H) 0.55 - 1.02 mg/dL    Calcium Level Total 9.5 8.4 - 10.2 mg/dL    Protein Total 7.6 6.4 - 8.3 gm/dL    Albumin Level 3.7 3.5 - 5.0 gm/dL    Globulin 3.9 (H) 2.4 - 3.5 gm/dL    Albumin/Globulin Ratio 0.9 (L) 1.1 - 2.0 ratio    Bilirubin Total 0.4 <=1.5 mg/dL    Alkaline Phosphatase 68 40 - 150 unit/L    Alanine Aminotransferase 15 0 - 55 unit/L    Aspartate Aminotransferase 24 5 - 34 unit/L    Estimated GFR- 47 mls/min/1.73/m2   Hemoglobin A1C    Collection Time: 06/22/22  8:06 AM   Result Value Ref Range    Hemoglobin A1c 6.8 <=7.0 %    Estimated Average Glucose 148.5 mg/dL   Lipid Panel    Collection Time: 06/22/22  8:06 AM   Result Value Ref Range    Cholesterol Total 155 <=200 mg/dL    HDL Cholesterol 34 (L) 35 - 60 mg/dL    Triglyceride 163 (H) 37 - 140 mg/dL    Cholesterol/HDL Ratio 5 0 - 5    Very Low Density Lipoprotein 33     LDL Cholesterol 88.00 50.00 - 140.00 mg/dL   TSH    Collection Time: 06/22/22  8:06 AM   Result Value Ref Range    Thyroid Stimulating Hormone 1.3150 0.3500 - 4.9400 uIU/mL   CBC with Differential    Collection Time: 06/22/22  8:06 AM   Result Value Ref Range    WBC 6.9 4.5 - 11.5 x10(3)/mcL    RBC 3.95 (L) 4.20  - 5.40 x10(6)/mcL    Hgb 11.4 (L) 12.0 - 16.0 gm/dL    Hct 35.7 (L) 37.0 - 47.0 %    MCV 90.4 80.0 - 94.0 fL    MCH 28.9 27.0 - 31.0 pg    MCHC 31.9 (L) 33.0 - 36.0 mg/dL    RDW 13.4 11.5 - 17.0 %    Platelet 335 130 - 400 x10(3)/mcL    MPV 9.9 9.4 - 12.4 fL    Neut % 46.1 %    Lymph % 35.0 %    Mono % 8.8 %    Eos % 7.8 %    Basophil % 2.0 %    Lymph # 2.43 0.6 - 4.6 x10(3)/mcL    Neut # 3.2 2.1 - 9.2 x10(3)/mcL    Mono # 0.61 0.1 - 1.3 x10(3)/mcL    Eos # 0.54 0 - 0.9 x10(3)/mcL    Baso # 0.14 0 - 0.2 x10(3)/mcL    IG# 0.02 (H) 0 - 0.0155 x10(3)/mcL    IG% 0.3 0 - 0.43 %    NRBC% 0.0 %       Imaging:  US Retroperitoneum Limited  FINDINGS: The bilateral kidneys demonstrate parenchymal thinning.   The right kidney measures 10.5 x 4.1 x 4.3 cm and is otherwise unremarkable. There is no right hydronephrosis.  The left kidney measures 10.5 x 4.2 x 5.2 cm and is otherwise unremarkable. There is no left hydronephrosis.     IMPRESSION:   1. Bilateral medical renal disease.    Signature Line  Electronically Signed By: Shai Ga MD  Date/Time Signed: 05/17/2021 14:05     Impression and Plan     CKD stage G3a/A1, GFR 45-59 and albumin creatinine ratio <30 mg/g  -     Comprehensive Metabolic Panel; Future; Expected date: 12/22/2022  -     Protein/Creatinine Ratio, Urine; Future; Expected date: 12/22/2022  -     Urinalysis; Future; Expected date: 12/22/2022  Possibly diabetic kidney disease.  Serum creatinine is stable, there is no significant proteinuria, imaging of the kidneys was essentially unremarkable. Continue risk factor management. Continue enalapril with close monitoring of serum potassium. Importance of taking all medications exactly as prescribed, especially Lokelma, discussed with the patient at length. Continue SGLT2 inhibitor therapy as well.  Continue renal sparing activities:    -Follow low sodium diet (2 grams a day)  -Control diabetes (goal A1C <7.0%)  -Control high blood pressure ( goal BP < 130/80,  please record BP at home every day and bring log to next office visit)  -Exercise at least 30 minutes a day, 5 days a week.  -Maintain healthy weight.  -Decrease or stop alcohol use  -Do not smoke  -Stay well hydrated  -Receive Pneumovax, Flu, and HBV vaccines if indicated.  -Do not take NSAIDs (Ibuprofen, Naproxen, Aleve, Advil, Toradol, Mobic), may take only Tylenol as needed for pain/headaches.  -Take cholesterol-lowering medications as prescribed (LDL goal <100)    Handout on treatment options for kidney disease provided.  F/U with PCP as scheduled  [facility] to [facility] with routine labs in 6 months    Hypertension, unspecified type  BP is at goal. Continue current medication regimen.     Anemia of chronic disease  Anemia of chronic disease. There are no indications for MARCIE at this time.     Hyperkalemia  Continue Lokelma as prescribed.     Class 3 severe obesity with body mass index (BMI) of 45.0 to 49.9 in adult, unspecified obesity type, unspecified whether serious comorbidity present   Lifestyle and dietary interventions discussed, patient counseled on weight loss using portion control, non sedentary lifestyle, low-carbohydrate/low fat diet.

## 2022-06-24 ENCOUNTER — PATIENT MESSAGE (OUTPATIENT)
Dept: NEPHROLOGY | Facility: CLINIC | Age: 57
End: 2022-06-24
Payer: MEDICARE

## 2022-06-24 ENCOUNTER — PATIENT MESSAGE (OUTPATIENT)
Dept: INTERNAL MEDICINE | Facility: CLINIC | Age: 57
End: 2022-06-24
Payer: MEDICARE

## 2022-06-24 DIAGNOSIS — K59.00 CONSTIPATION, UNSPECIFIED CONSTIPATION TYPE: Primary | ICD-10-CM

## 2022-06-24 RX ORDER — LINACLOTIDE 290 UG/1
290 CAPSULE, GELATIN COATED ORAL DAILY
Qty: 90 CAPSULE | Refills: 3 | Status: SHIPPED | OUTPATIENT
Start: 2022-06-24 | End: 2023-07-10

## 2022-06-30 ENCOUNTER — HOSPITAL ENCOUNTER (OUTPATIENT)
Dept: CARDIOLOGY | Facility: HOSPITAL | Age: 57
Discharge: HOME | End: 2022-06-30
Attending: NURSE PRACTITIONER
Payer: MEDICARE

## 2022-06-30 VITALS
SYSTOLIC BLOOD PRESSURE: 119 MMHG | BODY MASS INDEX: 43.99 KG/M2 | WEIGHT: 233 LBS | HEIGHT: 61 IN | DIASTOLIC BLOOD PRESSURE: 74 MMHG

## 2022-06-30 DIAGNOSIS — R06.09 DOE (DYSPNEA ON EXERTION): ICD-10-CM

## 2022-06-30 LAB
AV INDEX (PROSTH): 0.76
AV MEAN GRADIENT: 2 MMHG
AV PEAK GRADIENT: 4 MMHG
AV VALVE AREA: 2.29 CM2
AV VELOCITY RATIO: 0.81
BSA FOR ECHO PROCEDURE: 2.13 M2
CV ECHO LV RWT: 0.52 CM
DOP CALC AO PEAK VEL: 0.95 M/S
DOP CALC AO VTI: 20.9 CM
DOP CALC LVOT AREA: 3 CM2
DOP CALC LVOT DIAMETER: 1.96 CM
DOP CALC LVOT PEAK VEL: 0.77 M/S
DOP CALC LVOT STROKE VOLUME: 47.95 CM3
DOP CALC MV VTI: 19.9 CM
DOP CALCLVOT PEAK VEL VTI: 15.9 CM
E WAVE DECELERATION TIME: 185.01 MSEC
E/A RATIO: 0.88
E/E' RATIO: 7.56 M/S
ECHO LV POSTERIOR WALL: 0.87 CM (ref 0.6–1.1)
EJECTION FRACTION: 60 %
FRACTIONAL SHORTENING: 30 % (ref 28–44)
INTERVENTRICULAR SEPTUM: 1.02 CM (ref 0.6–1.1)
LEFT ATRIUM SIZE: 2.8 CM
LEFT INTERNAL DIMENSION IN SYSTOLE: 2.34 CM (ref 2.1–4)
LEFT VENTRICLE DIASTOLIC VOLUME INDEX: 22.44 ML/M2
LEFT VENTRICLE DIASTOLIC VOLUME: 45.33 ML
LEFT VENTRICLE MASS INDEX: 44 G/M2
LEFT VENTRICLE SYSTOLIC VOLUME INDEX: 9.4 ML/M2
LEFT VENTRICLE SYSTOLIC VOLUME: 18.99 ML
LEFT VENTRICULAR INTERNAL DIMENSION IN DIASTOLE: 3.34 CM (ref 3.5–6)
LEFT VENTRICULAR MASS: 88.62 G
LV LATERAL E/E' RATIO: 7.56 M/S
LV SEPTAL E/E' RATIO: 7.56 M/S
LVOT MG: 1.53 MMHG
LVOT MV: 0.6 CM/S
MV MEAN GRADIENT: 1 MMHG
MV PEAK A VEL: 0.77 M/S
MV PEAK E VEL: 0.68 M/S
MV PEAK GRADIENT: 3 MMHG
MV STENOSIS PRESSURE HALF TIME: 53.65 MS
MV VALVE AREA BY CONTINUITY EQUATION: 2.41 CM2
MV VALVE AREA P 1/2 METHOD: 4.1 CM2
PISA TR MAX VEL: 2.23 M/S
RA PRESSURE: 3 MMHG
RIGHT VENTRICULAR END-DIASTOLIC DIMENSION: 2.57 CM
TDI LATERAL: 0.09 M/S
TDI SEPTAL: 0.09 M/S
TDI: 0.09 M/S
TR MAX PG: 20 MMHG
TV REST PULMONARY ARTERY PRESSURE: 23 MMHG

## 2022-06-30 PROCEDURE — 93306 TTE W/DOPPLER COMPLETE: CPT

## 2022-07-07 ENCOUNTER — OFFICE VISIT (OUTPATIENT)
Dept: GASTROENTEROLOGY | Facility: CLINIC | Age: 57
End: 2022-07-07
Payer: MEDICARE

## 2022-07-07 VITALS
OXYGEN SATURATION: 100 % | SYSTOLIC BLOOD PRESSURE: 107 MMHG | BODY MASS INDEX: 44.62 KG/M2 | RESPIRATION RATE: 18 BRPM | HEIGHT: 61 IN | TEMPERATURE: 99 F | DIASTOLIC BLOOD PRESSURE: 73 MMHG | WEIGHT: 236.31 LBS | HEART RATE: 74 BPM

## 2022-07-07 DIAGNOSIS — Z12.11 SCREENING FOR COLON CANCER: ICD-10-CM

## 2022-07-07 DIAGNOSIS — K21.9 GASTROESOPHAGEAL REFLUX DISEASE, UNSPECIFIED WHETHER ESOPHAGITIS PRESENT: ICD-10-CM

## 2022-07-07 DIAGNOSIS — K59.09 CHRONIC CONSTIPATION: Primary | ICD-10-CM

## 2022-07-07 PROCEDURE — 99214 OFFICE O/P EST MOD 30 MIN: CPT | Mod: PBBFAC | Performed by: NURSE PRACTITIONER

## 2022-07-07 PROCEDURE — 99214 OFFICE O/P EST MOD 30 MIN: CPT | Mod: S$PBB,,, | Performed by: NURSE PRACTITIONER

## 2022-07-07 PROCEDURE — 99214 PR OFFICE/OUTPT VISIT, EST, LEVL IV, 30-39 MIN: ICD-10-PCS | Mod: S$PBB,,, | Performed by: NURSE PRACTITIONER

## 2022-07-07 NOTE — ASSESSMENT & PLAN NOTE
GERD lifestyle modifications  Reflux precautions  Avoid NSAID use  Continue omeprazole 40 mg daily  Will consider EGD with persistent or worsening symptoms  Call with updates  Follow-up clinic visit with NP in 6 months

## 2022-07-07 NOTE — ASSESSMENT & PLAN NOTE
Recommend soluble fiber supplementation  Avoid straining or sitting on the toilet for long periods of time  Continue Linzess 290 mcg daily  Okay to add MiraLAX 17 g daily as needed  Discussed starting Motegrity if Linzess becomes ineffective in controlling symptoms  Call with updates  Follow-up clinic visit with NP in 6 months

## 2022-07-07 NOTE — PROGRESS NOTES
Subjective:       Patient ID: Gloria Densi is a 56 y.o. female.    Chief Complaint: Gastroesophageal Reflux    This 56-year-old  female with a history of cholecystectomy, anxiety, depression, chronic back pain, diabetes mellitus, hypertension, dyslipidemia, obesity, and JORGE ALBERTO presents unaccompanied for a follow-up visit.  She was initially evaluated via telemedicine visit October 8, 2020, referred for chronic constipation at that time.  She reported a longstanding history of chronic constipation and going up to 1 week without a bowel movement.  She had frequent straining and had to sit on the toilet for long periods of time in order to have a bowel movement.  She had occasional nausea without vomiting and lower abdominal cramping that was typically relieved with defecation.  She had tried lactulose, MiraLAX, and Colace with minimal relief noted.  She was unable to tolerate lactulose and MiraLAX, which caused her nausea.  She took OTC Dulcolax on an as-needed basis with some relief noted.  She tried samples of Linzess in the past with good relief noted.  Her appetite was good and her weight was stable.  She denied nocturnal symptoms, fever, chills, odynophagia, dysphagia, acid reflux, heartburn, early satiety, hematochezia, melena, fecal urgency, fecal incontinence, or pain with defecation.  She was recommended to stop Dulcolax and start Linzess 145 mcg daily, as well as provided lifestyle and dietary modifications.    Laboratory results October 20, 2020 revealed potassium 5.2, glucose 225, BUN 29, GFR 55, globulin 4.70, and otherwise unremarkable CMP; cholesterol 226, triglycerides 255, , VLDL 51, and otherwise unremarkable lipid panel; TSH 1.647; hemoglobin 11.7, and otherwise unremarkable CBC.  Laboratory results December 24, 2020 revealed potassium 5.7, glucose 277, BUN 32.0, creatinine 1.47, GFR 47, globulin 4.2, and otherwise unremarkable CMP; hemoglobin A1c 9.7%; cholesterol  231, triglycerides 198, , and otherwise unremarkable lipid panel.    She was seen for a follow-up visit January 20, 2021.  She continued to take Linzess 145 mcg daily.  She continued with occasional constipation and did not always feel completely evacuated.  Her appetite was good and her weight was stable.  She denied nocturnal symptoms, fever, chills, nausea, vomiting, odynophagia, dysphagia, acid reflux, heartburn, early satiety, or abdominal pain.  She had 1-2 formed to soft stools daily without melena, hematochezia, fecal urgency, fecal incontinence, or pain with defecation.  Her Linzess dosage was increased to 290 mcg daily.    She was evaluated for a follow-up visit via telemedicine visit July 21, 2021.  She continued to take Linzess 290 mcg daily.  She felt as though the medication helped her symptoms but would occasionally feel incompletely evacuated with defecation on some days.  She had 1 formed stool daily.  She denied straining or sitting on the toilet for long periods of time.  She denied melena, hematochezia, fecal urgency, fecal incontinence, or pain with defecation.  Her appetite was good and her weight was stable.  She denied fever, chills, nausea, vomiting, odynophagia, dysphagia, early satiety, or abdominal pain.    She was last evaluated for a follow-up visit January 27, 2022.  She continued to take Linzess 290 mcg daily and reported having one bowel movement daily with good relief noted.  She denied abdominal pain, melena, hematochezia, fecal urgency, fecal incontinence, or pain with defecation.  She reported intermittent acid reflux described as burning up into the back of her throat.  She was taking Tums as needed with some relief noted.  She would awaken in the middle of the night with reflux and pyrosis a few times per week.  She denied fever, chills, nausea, vomiting, odynophagia, dysphagia, or early satiety.    Today, she presents for a follow-up visit and reports feeling well since  her last office visit.  She continues to take Linzess 290 mcg daily and omeprazole 40 mg daily and denies any problems at this time.    Stool for occult blood was negative 2021.  She denies ever having a colonoscopy done.  She had an EGD several years ago but she is unsure of the findings of the procedure.  She takes OTC Ibuprofen as needed for back pain and denies use of blood thinners.  She denies tobacco or alcohol use.  Her sister has a history of colon polyps.       Review of patient's allergies indicates:   Allergen Reactions    Aspirin        Past Medical History:   Diagnosis Date    Anxiety disorder, unspecified     Chronic constipation     CKD (chronic kidney disease)     Depression     DM (diabetes mellitus)     Dyslipidemia     GERD (gastroesophageal reflux disease)     HTN (hypertension)     Morbid obesity     Sleep apnea, unspecified        Past Surgical History:   Procedure Laterality Date    ARTHROSCOPIC REMOVAL OF LOOSE BODY FROM JOINT       SECTION      CHOLECYSTECTOMY      Drainage of oral abscess      Exploration using laparoscope         Family History:   family history includes Cancer in her brother and mother; Diabetes in her father; Heart attack in her father; Heart disease in her mother; Kidney failure in her father; Stroke in her father.    Social History:    reports that she has never smoked. She has never used smokeless tobacco. She reports that she does not drink alcohol and does not use drugs.    Review of Systems   All other systems reviewed and are negative.          Objective:      Physical Exam  Constitutional:       Appearance: Normal appearance.   HENT:      Head: Normocephalic.      Mouth/Throat:      Mouth: Mucous membranes are moist.   Eyes:      Extraocular Movements: Extraocular movements intact.      Conjunctiva/sclera: Conjunctivae normal.      Pupils: Pupils are equal, round, and reactive to light.   Cardiovascular:      Rate and Rhythm:  "Normal rate and regular rhythm.      Pulses: Normal pulses.      Heart sounds: Normal heart sounds.   Pulmonary:      Effort: Pulmonary effort is normal.      Breath sounds: Normal breath sounds.   Abdominal:      General: Bowel sounds are normal.      Palpations: Abdomen is soft.   Musculoskeletal:         General: Normal range of motion.      Cervical back: Normal range of motion and neck supple.   Skin:     General: Skin is warm and dry.   Neurological:      General: No focal deficit present.      Mental Status: She is alert and oriented to person, place, and time.   Psychiatric:         Mood and Affect: Mood normal.         Behavior: Behavior normal.         Thought Content: Thought content normal.         Judgment: Judgment normal.           Home Medications:     Current Outpatient Medications   Medication Sig    atorvastatin (LIPITOR) 40 MG tablet Take 40 mg by mouth once daily.    BD CAYLA 2ND GEN PEN NEEDLE 32 gauge x 5/32" Ndle SMARTSIG:Injection Every Night    betamethasone dipropionate 0.05 % cream   See Instructions, APPLY  CREAM TOPICALLY TWICE DAILY, # 60 gm, 0 Refill(s), Pharmacy: [id number], 155, cm, Height/Length Dosing, 07/08/21 8:39:00 CDT, 112, kg, Weight Dosing, 07/08/21 11:11:00 CDT    conjugated estrogens (PREMARIN) vaginal cream   See Instructions, Apply dime size amout to vaginal wall with a gloved finger x1 week then use 2x/week going forward. Do not use with intecourse., # 30 gm, 2 Refill(s), Pharmacy: [facility], 155, cm, Height/Length Dosing, 07/08/21...    DULoxetine (CYMBALTA) 30 MG capsule Take 30 mg by mouth 2 (two) times daily. 30 mg in the AM, 60 mg in the PM    enalapril (VASOTEC) 2.5 MG tablet Take 1 tablet (2.5 mg total) by mouth once daily.    hydrOXYzine pamoate (VISTARIL) 25 MG Cap Take 25 mg by mouth 3 (three) times daily as needed.    insulin detemir U-100 (LEVEMIR FLEXTOUCH U-100 INSULN) 100 unit/mL (3 mL) InPn pen Inject 30 Units into " the skin every evening.    JARDIANCE 10 mg tablet Take 10 mg by mouth every morning.    LINZESS 290 mcg Cap capsule Take 1 capsule (290 mcg total) by mouth once daily.    LOKELMA 10 gram packet SMARTSI Packet(s) By Mouth 3 Times a Week    metoprolol tartrate (LOPRESSOR) 25 MG tablet Take 25 mg by mouth 2 (two) times daily.    omeprazole (PRILOSEC) 40 MG capsule Take 40 mg by mouth as needed.    SITagliptan-metformin (JANUMET) 50-1,000 mg per tablet Take 1 tablet by mouth 2 (two) times a day.     No current facility-administered medications for this visit.       Laboratory Results:     Recent Results (from the past 2016 hour(s))   Comprehensive Metabolic Panel    Collection Time: 22  8:06 AM   Result Value Ref Range    Sodium Level 143 136 - 145 mmol/L    Potassium Level 4.3 3.5 - 5.1 mmol/L    Chloride 111 (H) 98 - 107 mmol/L    Carbon Dioxide 21 (L) 22 - 29 mmol/L    Glucose Level 149 (H) 74 - 100 mg/dL    Blood Urea Nitrogen 14.9 9.8 - 20.1 mg/dL    Creatinine 1.49 (H) 0.55 - 1.02 mg/dL    Calcium Level Total 9.5 8.4 - 10.2 mg/dL    Protein Total 7.6 6.4 - 8.3 gm/dL    Albumin Level 3.7 3.5 - 5.0 gm/dL    Globulin 3.9 (H) 2.4 - 3.5 gm/dL    Albumin/Globulin Ratio 0.9 (L) 1.1 - 2.0 ratio    Bilirubin Total 0.4 <=1.5 mg/dL    Alkaline Phosphatase 68 40 - 150 unit/L    Alanine Aminotransferase 15 0 - 55 unit/L    Aspartate Aminotransferase 24 5 - 34 unit/L    Estimated GFR- 47 mls/min/1.73/m2   Hemoglobin A1C    Collection Time: 22  8:06 AM   Result Value Ref Range    Hemoglobin A1c 6.8 <=7.0 %    Estimated Average Glucose 148.5 mg/dL   Lipid Panel    Collection Time: 22  8:06 AM   Result Value Ref Range    Cholesterol Total 155 <=200 mg/dL    HDL Cholesterol 34 (L) 35 - 60 mg/dL    Triglyceride 163 (H) 37 - 140 mg/dL    Cholesterol/HDL Ratio 5 0 - 5    Very Low Density Lipoprotein 33     LDL Cholesterol 88.00 50.00 - 140.00 mg/dL   TSH    Collection Time: 22  8:06 AM    Result Value Ref Range    Thyroid Stimulating Hormone 1.3150 0.3500 - 4.9400 uIU/mL   CBC with Differential    Collection Time: 06/22/22  8:06 AM   Result Value Ref Range    WBC 6.9 4.5 - 11.5 x10(3)/mcL    RBC 3.95 (L) 4.20 - 5.40 x10(6)/mcL    Hgb 11.4 (L) 12.0 - 16.0 gm/dL    Hct 35.7 (L) 37.0 - 47.0 %    MCV 90.4 80.0 - 94.0 fL    MCH 28.9 27.0 - 31.0 pg    MCHC 31.9 (L) 33.0 - 36.0 mg/dL    RDW 13.4 11.5 - 17.0 %    Platelet 335 130 - 400 x10(3)/mcL    MPV 9.9 9.4 - 12.4 fL    Neut % 46.1 %    Lymph % 35.0 %    Mono % 8.8 %    Eos % 7.8 %    Basophil % 2.0 %    Lymph # 2.43 0.6 - 4.6 x10(3)/mcL    Neut # 3.2 2.1 - 9.2 x10(3)/mcL    Mono # 0.61 0.1 - 1.3 x10(3)/mcL    Eos # 0.54 0 - 0.9 x10(3)/mcL    Baso # 0.14 0 - 0.2 x10(3)/mcL    IG# 0.02 (H) 0 - 0.0155 x10(3)/mcL    IG% 0.3 0 - 0.43 %    NRBC% 0.0 %   Echo    Collection Time: 06/30/22 10:44 AM   Result Value Ref Range    BSA 2.13 m2    TDI SEPTAL 0.09 m/s    LV LATERAL E/E' RATIO 7.56 m/s    LV SEPTAL E/E' RATIO 7.56 m/s    Right Atrial Pressure (from IVC) 3 mmHg    EF 60 %    Left Ventricular Outflow Tract Mean Velocity [id number] cm/s    Left Ventricular Outflow Tract Mean Gradient 1.53 mmHg    TDI LATERAL 0.09 m/s    LVIDd 3.34 (A) 3.5 - 6.0 cm    IVS 1.02 0.6 - 1.1 cm    Posterior Wall 0.87 0.6 - 1.1 cm    LVIDs 2.34 2.1 - 4.0 cm    FS 30 28 - 44 %    LV mass 88.62 g    LA size 2.80 cm    RVDD 2.57 cm    Left Ventricle Relative Wall Thickness 0.52 cm    AV mean gradient 2 mmHg    AV valve area 2.29 cm2    AV Velocity Ratio 0.81     AV index (prosthetic) 0.76     MV mean gradient 1 mmHg    MV valve area p 1/2 method 4.10 cm2    MV valve area by continuity eq 2.41 cm2    E/A ratio 0.88     Mean e' 0.09 m/s    E wave deceleration time [id number] msec    LVOT diameter 1.96 cm    LVOT area 3.0 cm2    LVOT peak camryn 0.77 m/s    LVOT peak VTI 15.90 cm    Ao peak camryn 0.95 m/s    Ao VTI 20.9 cm    LVOT stroke volume 47.95 cm3    AV peak gradient  4 mmHg    MV peak gradient 3 mmHg    TV rest pulmonary artery pressure 23 mmHg    E/E' ratio 7.56 m/s    MV Peak E Gavino 0.68 m/s    TR Max Gavino 2.23 m/s    MV VTI 19.9 cm    MV stenosis pressure 1/2 time [id number] ms    MV Peak A Gavino 0.77 m/s    LV Systolic Volume 18.99 mL    LV Systolic Volume Index 9.4 mL/m2    LV Diastolic Volume 45.33 mL    LV Diastolic Volume Index 22.44 mL/m2    LV Mass Index 44 g/m2    Triscuspid Valve Regurgitation Peak Gradient 20 mmHg       Assessment/Plan:     Problem List Items Addressed This Visit        GI    Gastroesophageal reflux disease     GERD lifestyle modifications  Reflux precautions  Avoid NSAID use  Continue omeprazole 40 mg daily  Will consider EGD with persistent or worsening symptoms  Call with updates  Follow-up clinic visit with NP in 6 months            Chronic constipation - Primary     Recommend soluble fiber supplementation  Avoid straining or sitting on the toilet for long periods of time  Continue Linzess 290 mcg daily  Okay to add MiraLAX 17 g daily as needed  Discussed starting Motegrity if Linzess becomes ineffective in controlling symptoms  Call with updates  Follow-up clinic visit with NP in 6 months           Screening for colon cancer     FIT testing ordered           Relevant Orders    OCCULT BLOOD FECAL IMMUNOASSAY

## 2022-07-13 RX ORDER — DULOXETIN HYDROCHLORIDE 30 MG/1
30 CAPSULE, DELAYED RELEASE ORAL 2 TIMES DAILY
Qty: 30 CAPSULE | Refills: 3 | Status: SHIPPED | OUTPATIENT
Start: 2022-07-13 | End: 2022-07-15 | Stop reason: SDUPTHER

## 2022-07-15 ENCOUNTER — TELEPHONE (OUTPATIENT)
Dept: ADMINISTRATIVE | Facility: HOSPITAL | Age: 57
End: 2022-07-15
Payer: MEDICARE

## 2022-07-15 ENCOUNTER — PATIENT MESSAGE (OUTPATIENT)
Dept: INTERNAL MEDICINE | Facility: CLINIC | Age: 57
End: 2022-07-15
Payer: MEDICARE

## 2022-07-15 ENCOUNTER — HOSPITAL ENCOUNTER (OUTPATIENT)
Dept: RADIOLOGY | Facility: HOSPITAL | Age: 57
Discharge: HOME | End: 2022-07-15
Attending: NURSE PRACTITIONER
Payer: MEDICARE

## 2022-07-15 DIAGNOSIS — M79.604 BILATERAL LEG PAIN: ICD-10-CM

## 2022-07-15 DIAGNOSIS — M79.605 BILATERAL LEG PAIN: ICD-10-CM

## 2022-07-15 PROCEDURE — 93925 LOWER EXTREMITY STUDY: CPT

## 2022-07-15 RX ORDER — DULOXETIN HYDROCHLORIDE 30 MG/1
CAPSULE, DELAYED RELEASE ORAL
Qty: 90 CAPSULE | Refills: 3 | Status: SHIPPED | OUTPATIENT
Start: 2022-07-15 | End: 2022-08-01 | Stop reason: SDUPTHER

## 2022-07-15 NOTE — TELEPHONE ENCOUNTER
Pharmacy requesting new rx for Duloxetine 30mg.     States pt insurance will not cover it written as: Take 1 capsule by mouth 2 times daily. 30 mg in the AM, 60 mg in the PM.    States pt previous covered rx was written as: Take 1 cap in the am and 2 caps at bedtime

## 2022-07-18 LAB
LEFT ANT TIBIAL SYS PSV: 46 CM/S
LEFT CFA PSV: 136 CM/S
LEFT DORSALIS PEDIS PSV: 38 CM/S
LEFT POPLITEAL PSV: 39 CM/S
LEFT POST TIBIAL SYS PSV: 28 CM/S
LEFT PROFUNDA SYS PSV: 152 CM/S
LEFT SUPER FEMORAL DIST SYS PSV: 44 CM/S
LEFT SUPER FEMORAL MID SYS PSV: 59 CM/S
LEFT SUPER FEMORAL PROX SYS PSV: 66 CM/S
OHS CV LEFT LOWER EXTREMITY ABI (NO CALC): 2.07
OHS CV RIGHT ABI LOWER EXTREMITY (NO CALC): 1.15
RIGHT ANT TIBIAL SYS PSV: 41 CM/S
RIGHT CFA PSV: 88 CM/S
RIGHT DORSALIS PEDIS PSV: 32 CM/S
RIGHT POPLITEAL PSV: 75 CM/S
RIGHT POST TIBIAL SYS PSV: 38 CM/S
RIGHT PROFUNDA SYS PSV: 84 CM/S
RIGHT SUPER FEMORAL DIST SYS PSV: 47 CM/S
RIGHT SUPER FEMORAL MID SYS PSV: 70 CM/S
RIGHT SUPER FEMORAL PROX SYS PSV: 65 CM/S

## 2022-08-01 ENCOUNTER — OFFICE VISIT (OUTPATIENT)
Dept: INTERNAL MEDICINE | Facility: CLINIC | Age: 57
End: 2022-08-01
Payer: MEDICARE

## 2022-08-01 VITALS
RESPIRATION RATE: 18 BRPM | HEART RATE: 85 BPM | SYSTOLIC BLOOD PRESSURE: 110 MMHG | DIASTOLIC BLOOD PRESSURE: 71 MMHG | BODY MASS INDEX: 42.62 KG/M2 | WEIGHT: 225.75 LBS | TEMPERATURE: 99 F | HEIGHT: 61 IN

## 2022-08-01 DIAGNOSIS — F41.8 MIXED ANXIETY DEPRESSIVE DISORDER: ICD-10-CM

## 2022-08-01 DIAGNOSIS — M54.41 CHRONIC BILATERAL LOW BACK PAIN WITH BILATERAL SCIATICA: ICD-10-CM

## 2022-08-01 DIAGNOSIS — E11.49 OTHER DIABETIC NEUROLOGICAL COMPLICATION ASSOCIATED WITH TYPE 2 DIABETES MELLITUS: Primary | ICD-10-CM

## 2022-08-01 DIAGNOSIS — N18.31 STAGE 3A CHRONIC KIDNEY DISEASE: ICD-10-CM

## 2022-08-01 DIAGNOSIS — E11.3292 TYPE 2 DIABETES MELLITUS WITH LEFT EYE AFFECTED BY MILD NONPROLIFERATIVE RETINOPATHY WITHOUT MACULAR EDEMA, WITHOUT LONG-TERM CURRENT USE OF INSULIN: ICD-10-CM

## 2022-08-01 DIAGNOSIS — G89.29 CHRONIC BILATERAL LOW BACK PAIN WITH BILATERAL SCIATICA: ICD-10-CM

## 2022-08-01 DIAGNOSIS — E78.5 DYSLIPIDEMIA: ICD-10-CM

## 2022-08-01 DIAGNOSIS — I10 PRIMARY HYPERTENSION: ICD-10-CM

## 2022-08-01 DIAGNOSIS — Z12.11 SCREENING FOR MALIGNANT NEOPLASM OF COLON: ICD-10-CM

## 2022-08-01 DIAGNOSIS — G47.33 OBSTRUCTIVE SLEEP APNEA SYNDROME: ICD-10-CM

## 2022-08-01 DIAGNOSIS — M54.42 CHRONIC BILATERAL LOW BACK PAIN WITH BILATERAL SCIATICA: ICD-10-CM

## 2022-08-01 PROCEDURE — 99214 PR OFFICE/OUTPT VISIT, EST, LEVL IV, 30-39 MIN: ICD-10-PCS | Mod: S$PBB,,, | Performed by: NURSE PRACTITIONER

## 2022-08-01 PROCEDURE — 99214 OFFICE O/P EST MOD 30 MIN: CPT | Mod: S$PBB,,, | Performed by: NURSE PRACTITIONER

## 2022-08-01 PROCEDURE — 99215 OFFICE O/P EST HI 40 MIN: CPT | Mod: PBBFAC | Performed by: NURSE PRACTITIONER

## 2022-08-01 RX ORDER — PREDNISONE 20 MG/1
20 TABLET ORAL DAILY
Qty: 3 TABLET | Refills: 0 | Status: SHIPPED | OUTPATIENT
Start: 2022-08-01 | End: 2022-08-04

## 2022-08-01 RX ORDER — DULOXETIN HYDROCHLORIDE 60 MG/1
60 CAPSULE, DELAYED RELEASE ORAL NIGHTLY
Qty: 90 CAPSULE | Refills: 3 | Status: SHIPPED | OUTPATIENT
Start: 2022-08-01 | End: 2023-02-01 | Stop reason: SDUPTHER

## 2022-08-01 RX ORDER — SITAGLIPTIN AND METFORMIN HYDROCHLORIDE 1000; 50 MG/1; MG/1
1 TABLET, FILM COATED ORAL 2 TIMES DAILY
Qty: 180 TABLET | Refills: 1 | Status: SHIPPED | OUTPATIENT
Start: 2022-08-01 | End: 2023-02-01 | Stop reason: SDUPTHER

## 2022-08-01 RX ORDER — INSULIN DETEMIR 100 [IU]/ML
30 INJECTION, SOLUTION SUBCUTANEOUS NIGHTLY
Qty: 27 ML | Refills: 1 | Status: SHIPPED | OUTPATIENT
Start: 2022-08-01 | End: 2023-02-01 | Stop reason: SDUPTHER

## 2022-08-01 RX ORDER — EMPAGLIFLOZIN 10 MG/1
10 TABLET, FILM COATED ORAL EVERY MORNING
Qty: 90 TABLET | Refills: 1 | Status: SHIPPED | OUTPATIENT
Start: 2022-08-01 | End: 2023-02-01 | Stop reason: DRUGHIGH

## 2022-08-01 RX ORDER — DULOXETIN HYDROCHLORIDE 30 MG/1
30 CAPSULE, DELAYED RELEASE ORAL DAILY
Qty: 90 CAPSULE | Refills: 3 | Status: ON HOLD | OUTPATIENT
Start: 2022-08-01 | End: 2023-01-10 | Stop reason: HOSPADM

## 2022-08-01 NOTE — ASSESSMENT & PLAN NOTE
Continue cymbalta 30 mg qam and 60 mg qpm.  Take meds as prescribed.  Control blood glucose levels.  Encouraged aerobic exercise 30 minutes per day 5 times a week.  Protect feet; check feet daily, wear proper footwear and padded socks.  Continue to use cane for ambulation.

## 2022-08-01 NOTE — ASSESSMENT & PLAN NOTE
GFR 47.  Keep renal clinic appts as scheduled.  Renoprotective measures discussed:  -Follow renal diet (reduce intake of nuts, peanut butter, milk, cheese, dried beans, peas) and Low Sodium Diet (less than 2 grams per day).  -Control high blood pressure ( goal BP < 130/80, please record BP at home every day and bring log to next office visit)  -Exercise at least 30 minutes a day, 5 days a week.  -Maintain healthy weight.  -Decrease or stop alcohol use.  -Do not smoke.  -Stay well hydrated.  -Receive Pneumovax, Flu, and HBV vaccines if indicated.  -Do not take NSAIDs (Ibuprofen, Naproxen, Aleve, Advil, Toradol, Mobic), may take only Tylenol as needed for pain/headaches.  -Take cholesterol-lowering medications as prescribed (LDL goal <100).

## 2022-08-01 NOTE — ASSESSMENT & PLAN NOTE
BMI 42. Goal BMI <30.  Encouraged and congratulated on 20 lb weight loss.  Aerobic exercise 150 minutes per week.  Avoid soda, simple sugars, sweets, excessive rice, pasta, potatoes or bread.   Choose brown options when available and portion control.  Limit fast foods and fried foods.   Choose complex carbs in moderation (ex: green, leafy vegetables, beans, oatmeal).  Eat plenty of fresh fruits and vegetables with lean meats daily.   Consider permanent healthy lifestyle changes.

## 2022-08-01 NOTE — ASSESSMENT & PLAN NOTE
Refilled cymbalta.  Denies SI/HI.  Read positive daily meditations, avoid negative media, set healthy boundaries.  Exercise daily, keep consistent sleep pattern, eat a healthy diet.  Establish good social support, make changes to reduce stress.  Do not drink alcohol or use illicit drugs.  Reports any symptoms of suicidal/homicidal ideations or self harm immediately, go to nearest emergency room.

## 2022-08-01 NOTE — ASSESSMENT & PLAN NOTE
Reports compliance with wearing BiPAP nightly.  Reports decreased EDS, snoring and fatigue.  Pt is benefiting from its use.  Expect lifetime use and decreased daytime sleepiness/fatigue.   Avoid excessive alcohol, smoking and overuse of sedatives at bedtime.  Weight management discussed.  Adjust sleep position as needed for increased comfort.

## 2022-08-01 NOTE — ASSESSMENT & PLAN NOTE
Declines injection today.  Rx prednisone 20 mg daily x 3 days.  A1C at goal; advised may increase CBGs while taking - understanding voiced.  Perform back stretches daily.   Avoid activities than increase back pain or stiffness.   Apply heating pad, ice pack, and BioFreeze as needed; alternate every 15-20 minutes.   Massage back to loosen muscles.   Continue tylenol arthritis as needed.  Use cane for ambulation.  Fall precautions.

## 2022-08-01 NOTE — PROGRESS NOTES
ALYX Robison   [facility]  [address] [facility] [address]      PATIENT NAME: Gloria Denis  : 1965  DATE: 22  MRN: [id number]      Billing Provider: ALYX Robison  Level of Service:   Patient PCP Information     Provider PCP Type    ALYX Robison General          Reason for Visit / Chief Complaint: Follow-up (Test results)       History of Present Illness / Problem Focused Workflow     Gloria Denis is a 56 y.o. Black or  female presents to the clinic for diabetes f/u visit. PMH HTN, dyslipidemia, JORGE ALBERTO on Bipap, DM, DM neuropathy, depression, anxiety, CKD, hyperkalemia, GERD, chronic constipation, morbid obesity, atrophic vaginitis, Yolanda-Yolanda dz, and chronic back pain. Followed by [facility] cardio, GYN, renal and GI clinics. Pt was referred to optho clinic as fundus revealed mild retinopathy; is scheduled next week. Reports worsening low back pain with sciatica pain radiating down legs, R>L, recently. Has been completing more tasks since  has worsening weakness. Reports CBGs ranging 90-130s when checked BID. Has been following ADA diet. Has lost 20 lbs since last visit; has stopped drinking sodas and rice consumption. Continues to use cane for ambulation; denies any recent falls. Reports med compliance. Continues to wear bipap nightly and is benefitting from its use. Declines shingles vaccine again today. Denies chest pain, shortness of breath, cough, fever, headache, dizziness, weakness, abdominal pain, nausea, vomiting, diarrhea, constipation, dysuria, depression, anxiety, SI/HI.    Review of Systems     Review of Systems   Constitutional: Negative.    HENT: Negative.    Eyes: Negative.    Respiratory: Negative.    Cardiovascular: Negative.    Gastrointestinal: Negative.    Endocrine: Negative.    Genitourinary: Negative.    Musculoskeletal: Positive for back  "pain.   Integumentary:  Negative.   Neurological: Negative.    Psychiatric/Behavioral: Negative.         Medical / Social / Family History     Past Medical History:   Diagnosis Date    Anxiety disorder, unspecified     Chronic constipation     CKD (chronic kidney disease)     Depression     DM (diabetes mellitus)     Dyslipidemia     GERD (gastroesophageal reflux disease)     HTN (hypertension)     Morbid obesity     Sleep apnea, unspecified        Past Surgical History:   Procedure Laterality Date    ARTHROSCOPIC REMOVAL OF LOOSE BODY FROM JOINT       SECTION      CHOLECYSTECTOMY      Drainage of oral abscess      Exploration using laparoscope         Social History  Ms. Denis  reports that she has never smoked. She has never used smokeless tobacco. She reports that she does not drink alcohol and does not use drugs.    Family History  Ms. Denis' family history includes Cancer in her brother and mother; Diabetes in her father; Heart attack in her father; Heart disease in her mother; Kidney failure in her father; Stroke in her father.    Medications and Allergies     Medications  Medication List with Changes/Refills   New Medications    DULOXETINE (CYMBALTA) 60 MG CAPSULE    Take 1 capsule (60 mg total) by mouth every evening.    PREDNISONE (DELTASONE) 20 MG TABLET    Take 1 tablet (20 mg total) by mouth once daily. for 3 days   Current Medications    ATORVASTATIN (LIPITOR) 40 MG TABLET    Take 40 mg by mouth once daily.    BD CAYLA 2ND GEN PEN NEEDLE 32 GAUGE X 5/32" NDLE    SMARTSIG:Injection Every Night    BETAMETHASONE DIPROPIONATE 0.05 % CREAM      See Instructions, APPLY  CREAM TOPICALLY TWICE DAILY, # 60 gm, 0 Refill(s), Pharmacy: [id number], 155, cm, Height/Length Dosing, 21 8:39:00 CDT, 112, kg, Weight Dosing, 21 11:11:00 CDT    CONJUGATED ESTROGENS (PREMARIN) VAGINAL CREAM      See Instructions, Apply dime size amout to vaginal wall with a gloved finger x1 week " then use 2x/week going forward. Do not use with intecourse., # 30 gm, 2 Refill(s), Pharmacy: [facility], 155, cm, Height/Length Dosing, 21...    ENALAPRIL (VASOTEC) 2.5 MG TABLET    Take 1 tablet (2.5 mg total) by mouth once daily.    HYDROXYZINE PAMOATE (VISTARIL) 25 MG CAP    Take 25 mg by mouth 3 (three) times daily as needed.    LINZESS 290 MCG CAP CAPSULE    Take 1 capsule (290 mcg total) by mouth once daily.    LOKELMA 10 GRAM PACKET    SMARTSI Packet(s) By Mouth 3 Times a Week    METOPROLOL TARTRATE (LOPRESSOR) 25 MG TABLET    Take 25 mg by mouth 2 (two) times daily.    OMEPRAZOLE (PRILOSEC) 40 MG CAPSULE    Take 40 mg by mouth as needed.   Changed and/or Refilled Medications    Modified Medication Previous Medication    DULOXETINE (CYMBALTA) 30 MG CAPSULE DULoxetine (CYMBALTA) 30 MG capsule       Take 1 capsule (30 mg total) by mouth once daily at 6am.    Take 1 capsule qam and 2 capsules at bedtime.    INSULIN DETEMIR U-100 (LEVEMIR FLEXTOUCH U-100 INSULN) 100 UNIT/ML (3 ML) INPN PEN insulin detemir U-100 (LEVEMIR FLEXTOUCH U-100 INSULN) 100 unit/mL (3 mL) InPn pen       Inject 30 Units into the skin every evening.    Inject 30 Units into the skin every evening.    JARDIANCE 10 MG TABLET JARDIANCE 10 mg tablet       Take 1 tablet (10 mg total) by mouth every morning.    Take 10 mg by mouth every morning.    SITAGLIPTAN-METFORMIN (JANUMET) 50-1,000 MG PER TABLET SITagliptan-metformin (JANUMET) 50-1,000 mg per tablet       Take 1 tablet by mouth 2 (two) times a day.    Take 1 tablet by mouth 2 (two) times a day.         Allergies  Review of patient's allergies indicates:   Allergen Reactions    Aspirin        Physical Examination       Physical Exam  Vitals reviewed.   Constitutional:       Appearance: Normal appearance.   HENT:      Head: Normocephalic.   Eyes:      Pupils: Pupils are equal, round, and reactive to light.   Cardiovascular:      Rate and Rhythm: Normal rate and  regular rhythm.      Heart sounds: Normal heart sounds.   Pulmonary:      Effort: Pulmonary effort is normal.      Breath sounds: Normal breath sounds.   Abdominal:      General: Bowel sounds are normal.      Palpations: Abdomen is soft.   Musculoskeletal:      Lumbar back: Spasms and tenderness present. Decreased range of motion.   Skin:     General: Skin is warm.   Neurological:      Mental Status: She is alert and oriented to person, place, and time.   Psychiatric:         Mood and Affect: Mood normal.         Speech: Speech normal.         Behavior: Behavior is cooperative.         Judgment: Judgment normal.           Results     Lab Results   Component Value Date    WBC 6.9 06/22/2022    RBC 3.95 (L) 06/22/2022    HGB 11.4 (L) 06/22/2022    HCT 35.7 (L) 06/22/2022    MCV 90.4 06/22/2022    MCH 28.9 06/22/2022    MCHC 31.9 (L) 06/22/2022    RDW 13.4 06/22/2022     06/22/2022    MPV 9.9 06/22/2022     Sodium Level   Date Value Ref Range Status   06/22/2022 143 136 - 145 mmol/L Final     Potassium Level   Date Value Ref Range Status   06/22/2022 4.3 3.5 - 5.1 mmol/L Final     Carbon Dioxide   Date Value Ref Range Status   06/22/2022 21 (L) 22 - 29 mmol/L Final     Blood Urea Nitrogen   Date Value Ref Range Status   06/22/2022 14.9 9.8 - 20.1 mg/dL Final     Creatinine   Date Value Ref Range Status   06/22/2022 1.49 (H) 0.55 - 1.02 mg/dL Final     Calcium Level Total   Date Value Ref Range Status   06/22/2022 9.5 8.4 - 10.2 mg/dL Final     Albumin Level   Date Value Ref Range Status   06/22/2022 3.7 3.5 - 5.0 gm/dL Final     Bilirubin Total   Date Value Ref Range Status   06/22/2022 0.4 <=1.5 mg/dL Final     Alkaline Phosphatase   Date Value Ref Range Status   06/22/2022 68 40 - 150 unit/L Final     Aspartate Aminotransferase   Date Value Ref Range Status   06/22/2022 24 5 - 34 unit/L Final     Alanine Aminotransferase   Date Value Ref Range Status   06/22/2022 15 0 - 55 unit/L Final     Estimated GFR-Non     Date Value Ref Range Status   02/21/2022 49 >=90      Lab Results   Component Value Date    CHOL 155 06/22/2022     Lab Results   Component Value Date    HDL 34 (L) 06/22/2022     No results found for: LDLCALC  Lab Results   Component Value Date    TRIG 163 (H) 06/22/2022     No results found for: CHOLHDL  Lab Results   Component Value Date    TSH 1.3150 06/22/2022     Lab Results   Component Value Date    PHUR 5.5 02/21/2022    PROTEINUA Negative 02/21/2022    GLUCUA Normal 02/21/2022    KETONESU Negative 02/21/2022    OCCULTUA Negative 02/21/2022    NITRITE Negative 02/21/2022    LEUKOCYTESUR 500 02/21/2022     Lab Results   Component Value Date    HGBA1C 6.8 06/22/2022    HGBA1C 7.2 02/21/2022    HGBA1C 6.5 10/11/2021     No results found for: MICROALBUR, [id number]   No results found for this or any previous visit.         Assessment and Plan (including Health Maintenance)     Plan: RTC 6 months and prn. Labs one week prior to appt.         Health Maintenance Due   Topic Date Due    Pneumococcal Vaccines (Age 0-64) (1 - PCV) Never done    Colorectal Cancer Screening  Never done    Shingles Vaccine (1 of 2) Never done    COVID-19 Vaccine (4 - Booster for Pfizer series) 02/20/2022       Problem List Items Addressed This Visit        Neuro    Diabetic neuropathy - Primary    Current Assessment & Plan     Continue cymbalta 30 mg qam and 60 mg qpm.  Take meds as prescribed.  Control blood glucose levels.  Encouraged aerobic exercise 30 minutes per day 5 times a week.  Protect feet; check feet daily, wear proper footwear and padded socks.  Continue to use cane for ambulation.             Relevant Medications    insulin detemir U-100 (LEVEMIR FLEXTOUCH U-100 INSULN) 100 unit/mL (3 mL) InPn pen    SITagliptan-metformin (JANUMET) 50-1,000 mg per tablet       Psychiatric    Mixed anxiety depressive disorder    Current Assessment & Plan     Refilled cymbalta.  Denies SI/HI.  Read positive daily  meditations, avoid negative media, set healthy boundaries.  Exercise daily, keep consistent sleep pattern, eat a healthy diet.  Establish good social support, make changes to reduce stress.  Do not drink alcohol or use illicit drugs.  Reports any symptoms of suicidal/homicidal ideations or self harm immediately, go to nearest emergency room.                  Cardiac/Vascular    Dyslipidemia    Current Assessment & Plan     LDL 88 / Trig 163 / HDL - 34 / total chol - 155.  Continue atorvastatin as prescribed.   Follow a low cholesterol, low saturated fat diet with less than 200 mg of cholesterol a day.   Avoid fried foods and high saturated fats (ricks, sausage, cookies, cakes, chips, cheese, whole milk, butter, mayonnaise, creamy dressings, gravy, stew, gumbo, boudin, cracklins and cream sauces).  Add flax seed or fish oil supplements to diet.   Increase dietary fiber.   Regular exercise improves cholesterol levels.  Physical activity 5 times a week for 30 minutes per day (or 150 minutes per week).   Stressed importance of              Hypertension    Current Assessment & Plan     At goal.  Continue enalapril and metoprolol.  Follow a low sodium (less than 2 grams of sodium per day), DASH diet.   Continue medications as prescribed.  Monitor blood pressure and report any consistent values greater than 140/90 and keep a log.  Maintain healthy weight with a BMI goal of <30.   Aerobic exercise for 150 minutes per week (or 5 days a week for 30 minutes each day).                Renal/    CKD (chronic kidney disease)    Current Assessment & Plan     GFR 47.  Keep renal clinic appts as scheduled.  Renoprotective measures discussed:  -Follow renal diet (reduce intake of nuts, peanut butter, milk, cheese, dried beans, peas) and Low Sodium Diet (less than 2 grams per day).  -Control high blood pressure ( goal BP < 130/80, please record BP at home every day and bring log to next office visit)  -Exercise at least 30 minutes a  day, 5 days a week.  -Maintain healthy weight.  -Decrease or stop alcohol use.  -Do not smoke.  -Stay well hydrated.  -Receive Pneumovax, Flu, and HBV vaccines if indicated.  -Do not take NSAIDs (Ibuprofen, Naproxen, Aleve, Advil, Toradol, Mobic), may take only Tylenol as needed for pain/headaches.  -Take cholesterol-lowering medications as prescribed (LDL goal <100).                Endocrine    Diabetes mellitus    Current Assessment & Plan     A1C 6.8 at goal. Previous A1C 7.2.   Continue levemir 30 units at bedtime.  Continue janumet BID.  Continue jardiance 10 mg daily.  Continue medications as prescribed.  Follow ADA diet.  Avoid soda, simple sweets, and limit rice/pasta/bread/starches and consume brown options when possible.   Maintain healthy weight with BMI goal <30.   Perform aerobic exercise for 150 minutes per week (or 5 days a week for 30 minutes each day).   Examine feet daily.   Obtain annual dilated eye exam.  Eye exam: 4/14/22  Foot exam: 4/14/22             Relevant Medications    insulin detemir U-100 (LEVEMIR FLEXTOUCH U-100 INSULN) 100 unit/mL (3 mL) InPn pen    SITagliptan-metformin (JANUMET) 50-1,000 mg per tablet    Other Relevant Orders    Hemoglobin A1C    Ambulatory referral/consult to Wound Clinic    BMI 40.0-44.9, adult    Current Assessment & Plan     BMI 42. Goal BMI <30.  Encouraged and congratulated on 20 lb weight loss.  Aerobic exercise 150 minutes per week.  Avoid soda, simple sugars, sweets, excessive rice, pasta, potatoes or bread.   Choose brown options when available and portion control.  Limit fast foods and fried foods.   Choose complex carbs in moderation (ex: green, leafy vegetables, beans, oatmeal).  Eat plenty of fresh fruits and vegetables with lean meats daily.   Consider permanent healthy lifestyle changes.                Orthopedic    Chronic bilateral low back pain with bilateral sciatica    Current Assessment & Plan     Declines injection today.  Rx prednisone 20 mg  daily x 3 days.  A1C at goal; advised may increase CBGs while taking - understanding voiced.  Perform back stretches daily.   Avoid activities than increase back pain or stiffness.   Apply heating pad, ice pack, and BioFreeze as needed; alternate every 15-20 minutes.   Massage back to loosen muscles.   Continue tylenol arthritis as needed.  Use cane for ambulation.  Fall precautions.                Other    Sleep apnea    Current Assessment & Plan     Reports compliance with wearing BiPAP nightly.  Reports decreased EDS, snoring and fatigue.  Pt is benefiting from its use.  Expect lifetime use and decreased daytime sleepiness/fatigue.   Avoid excessive alcohol, smoking and overuse of sedatives at bedtime.  Weight management discussed.  Adjust sleep position as needed for increased comfort.               Other Visit Diagnoses     Screening for malignant neoplasm of colon        Relevant Orders    Cologuard Screening (Multitarget Stool DNA)          Health Maintenance Topics with due status: Not Due       Topic Last Completion Date    TETANUS VACCINE 05/19/2017    Cervical Cancer Screening 08/26/2020    Diabetes Urine Screening 10/11/2021    Foot Exam 04/14/2022    Eye Exam 04/18/2022    Mammogram 05/16/2022    Lipid Panel 06/22/2022    Hemoglobin A1c 06/22/2022    Low Dose Statin 08/01/2022    Influenza Vaccine Not Due       Future Appointments   Date Time Provider Department Center   8/10/2022  2:15 PM PROVIDERS, RENNY [place]   8/26/2022  9:50 AM BLAKE Vernon [facility] GYN [place]   9/9/2022 10:30 AM BLAKE [place] [facility] CARD [place]   12/22/2022  9:30 AM ALYX Mercado [facility] NEPHR [place]   1/9/2023  8:00 AM ALYX Love [facility] GASTRO [place]   2/1/2023  7:30 AM ALYX Foreman [facility] INTMED [place]            Signature:  ALYX Robison  [facility]  [address] [facility]  [id number]    Date of encounter: 8/1/22

## 2022-08-01 NOTE — ASSESSMENT & PLAN NOTE
LDL 88 / Trig 163 / HDL - 34 / total chol - 155.  Continue atorvastatin as prescribed.   Follow a low cholesterol, low saturated fat diet with less than 200 mg of cholesterol a day.   Avoid fried foods and high saturated fats (ricks, sausage, cookies, cakes, chips, cheese, whole milk, butter, mayonnaise, creamy dressings, gravy, stew, gumbo, boudin, cracklins and cream sauces).  Add flax seed or fish oil supplements to diet.   Increase dietary fiber.   Regular exercise improves cholesterol levels.  Physical activity 5 times a week for 30 minutes per day (or 150 minutes per week).   Stressed importance of

## 2022-08-01 NOTE — ASSESSMENT & PLAN NOTE
At goal.  Continue enalapril and metoprolol.  Follow a low sodium (less than 2 grams of sodium per day), DASH diet.   Continue medications as prescribed.  Monitor blood pressure and report any consistent values greater than 140/90 and keep a log.  Maintain healthy weight with a BMI goal of <30.   Aerobic exercise for 150 minutes per week (or 5 days a week for 30 minutes each day).

## 2022-08-01 NOTE — ASSESSMENT & PLAN NOTE
A1C 6.8 at goal. Previous A1C 7.2.   Continue levemir 30 units at bedtime.  Continue janumet BID.  Continue jardiance 10 mg daily.  Continue medications as prescribed.  Follow ADA diet.  Avoid soda, simple sweets, and limit rice/pasta/bread/starches and consume brown options when possible.   Maintain healthy weight with BMI goal <30.   Perform aerobic exercise for 150 minutes per week (or 5 days a week for 30 minutes each day).   Examine feet daily.   Obtain annual dilated eye exam.  Eye exam: 4/14/22  Foot exam: 4/14/22

## 2022-08-08 ENCOUNTER — HOSPITAL ENCOUNTER (OUTPATIENT)
Dept: WOUND CARE | Facility: HOSPITAL | Age: 57
Discharge: HOME | End: 2022-08-08
Attending: NURSE PRACTITIONER
Payer: MEDICARE

## 2022-08-08 VITALS
RESPIRATION RATE: 18 BRPM | HEART RATE: 97 BPM | SYSTOLIC BLOOD PRESSURE: 104 MMHG | DIASTOLIC BLOOD PRESSURE: 68 MMHG | TEMPERATURE: 99 F

## 2022-08-08 DIAGNOSIS — E11.3292 TYPE 2 DIABETES MELLITUS WITH LEFT EYE AFFECTED BY MILD NONPROLIFERATIVE RETINOPATHY WITHOUT MACULAR EDEMA, WITHOUT LONG-TERM CURRENT USE OF INSULIN: ICD-10-CM

## 2022-08-08 DIAGNOSIS — L60.2 OVERGROWN TOENAILS: Primary | ICD-10-CM

## 2022-08-08 PROCEDURE — 11719 PR TRIM NAIL(S): ICD-10-PCS | Mod: GY,,, | Performed by: NURSE PRACTITIONER

## 2022-08-08 PROCEDURE — 99212 OFFICE O/P EST SF 10 MIN: CPT | Mod: 25,,, | Performed by: NURSE PRACTITIONER

## 2022-08-08 PROCEDURE — 99212 PR OFFICE/OUTPT VISIT, EST, LEVL II, 10-19 MIN: ICD-10-PCS | Mod: 25,,, | Performed by: NURSE PRACTITIONER

## 2022-08-08 PROCEDURE — 11719 TRIM NAIL(S) ANY NUMBER: CPT | Mod: GY,,, | Performed by: NURSE PRACTITIONER

## 2022-08-08 PROCEDURE — 11719 TRIM NAIL(S) ANY NUMBER: CPT

## 2022-08-08 NOTE — PROGRESS NOTES
CHIEF COMPLAINT:    Diabetic foot care      HISTORY OF  PRESENT ILLNESS:  56 y.o. Black or  female being seen today for routine diabetic foot care.  Unable to reach toes to trim her nails.   Denies hx of diabetic foot ulcers.  Never smoker.  No vascular hx.  Reports occasional burning and tingling in feet, with color changes over toenail beds.  Followed by ALYX Robison for PCP. History includes:        Past Medical History:   Diagnosis Date    Anxiety disorder, unspecified     Chronic constipation     CKD (chronic kidney disease)     Depression     DM (diabetes mellitus)     Dyslipidemia     GERD (gastroesophageal reflux disease)     HTN (hypertension)     Morbid obesity     Sleep apnea, unspecified       Past Surgical History:   Procedure Laterality Date    ARTHROSCOPIC REMOVAL OF LOOSE BODY FROM JOINT       SECTION      CHOLECYSTECTOMY      Drainage of oral abscess      Exploration using laparoscope        Social History     Socioeconomic History    Marital status:      Spouse name: Kp   Tobacco Use    Smoking status: Never Smoker    Smokeless tobacco: Never Used   Substance and Sexual Activity    Alcohol use: Never    Drug use: Never    Sexual activity: Yes     Partners: Male     Social Determinants of Health     Transportation Needs: Unmet Transportation Needs    Lack of Transportation (Medical): Yes    Lack of Transportation (Non-Medical): No   Housing Stability: Low Risk     Unable to Pay for Housing in the Last Year: No    Number of Places Lived in the Last Year: 1    Unstable Housing in the Last Year: No         Review of Most Recent Wound Care-Related Labs:  Lab Results   Component Value Date/Time    WBC 6.9 2022 08:06 AM    RBC 3.95 (L) 2022 08:06 AM    HGB 11.4 (L) 2022 08:06 AM    HCT 35.7 (L) 2022 08:06 AM    MCV 90.4 2022 08:06 AM    MCH 28.9 2022 08:06 AM    CREATININE 1.49 (H) 2022 08:06 AM     HGBA1C 6.8 2022 08:06 AM    CRP 0.7 2017 07:40 AM        REVIEW OF SYSTEMS:  Except as stated in HPI, all other 10 body systems normal.     Current Outpatient Medications   Medication Sig Dispense Refill    atorvastatin (LIPITOR) 40 MG tablet Take 40 mg by mouth once daily.      betamethasone dipropionate 0.05 % cream   See Instructions, APPLY  CREAM TOPICALLY TWICE DAILY, # 60 gm, 0 Refill(s), Pharmacy: [id number], 155, cm, Height/Length Dosing, 21 8:39:00 CDT, 112, kg, Weight Dosing, 21 11:11:00 CDT      conjugated estrogens (PREMARIN) vaginal cream   See Instructions, Apply dime size amout to vaginal wall with a gloved finger x1 week then use 2x/week going forward. Do not use with intecourse., # 30 gm, 2 Refill(s), Pharmacy: [facility], 155, cm, Height/Length Dosing, 21...      DULoxetine (CYMBALTA) 30 MG capsule Take 1 capsule (30 mg total) by mouth once daily at 6am. 90 capsule 3    DULoxetine (CYMBALTA) 60 MG capsule Take 1 capsule (60 mg total) by mouth every evening. 90 capsule 3    enalapril (VASOTEC) 2.5 MG tablet Take 1 tablet (2.5 mg total) by mouth once daily. 90 tablet 1    hydrOXYzine pamoate (VISTARIL) 25 MG Cap Take 25 mg by mouth 3 (three) times daily as needed.      insulin detemir U-100 (LEVEMIR FLEXTOUCH U-100 INSULN) 100 unit/mL (3 mL) InPn pen Inject 30 Units into the skin every evening. 27 mL 1    JARDIANCE 10 mg tablet Take 1 tablet (10 mg total) by mouth every morning. 90 tablet 1    LINZESS 290 mcg Cap capsule Take 1 capsule (290 mcg total) by mouth once daily. 90 capsule 3    LOKELMA 10 gram packet SMARTSI Packet(s) By Mouth 3 Times a Week 45 packet 0    metoprolol tartrate (LOPRESSOR) 25 MG tablet Take 25 mg by mouth 2 (two) times daily.      omeprazole (PRILOSEC) 40 MG capsule Take 40 mg by mouth as needed.      SITagliptan-metformin (JANUMET) 50-1,000 mg per tablet Take 1 tablet by mouth 2 (two) times a  "day. 180 tablet 1    BD CAYLA 2ND GEN PEN NEEDLE 32 gauge x 5/32" Ndle SMARTSIG:Injection Every Night       No current facility-administered medications for this encounter.         PHYSICAL EXAMINATION AND VITAL SIGNS:    Vitals:    08/08/22 1101   BP: 104/68   Pulse: 97   Resp: 18   Temp: 98.6 °F (37 °C)     There is no height or weight on file to calculate BMI.    General:  VSS, afebrile.  Morbidly obese, in no acute distress.   Respiratory: breathing even, quiet, and unlabored.  No coughing.  Cardiovascular:  No peripheral edema.  Bilateral DP pulses palpable.   Bilateral PT pulses dopplered.  No hemosiderin staining or varicosities.  No hair distrubition over BLE or toes.  Several toenails overgrown, without dystrophic changes.    Musculoskeletal:  full range of motion of all extremities; no joint deformities or edema.  No bunions or hammertoes.    Neurologic:  A&O X 3.  Cranial nerves grossly intact.   Normal monofilament testing.  Psychiatric:  Calm, cooperative.  Mood and effect normal.  Responses appropriate.   Integumentary:    No rashes, lesions, or skin breakdown.  No calluses.                       ASSESSMENT AND PLAN:    Type 2 diabetes, with CKD:  Diabetes well-controlled.  Being managed by PCP.  No LOS identified on monofilament testing.  Pedal pulses present.   Type 2 diabetes:  Diabetic foot care teaching, with wound clinic and [facility] precautions for any wounds, nail, or skin issues.  Instructed prompt medical attention decreases risk of lower limb amputations.  Instructed to check feet/toes in morning and evening for skin changes and to reach out to wound clinic or [facility] promptly for any breaks in skin.  Instructed on risk of non-healing wounds, osteomyelitis, and amputations with diabetes, with focus towards impaired immune response with diabetes and how that affects wound healing.  Instructed not to cut own toenails; however, to use an [place] board to file down any sharp nails that may be getting " caught on socks, or causing pain to surrounding skin, with rationale.  Instructed not to soak feet in epsom salt water, with rationale.  Instructed not to put any lotions or creams between toes, unless prescribed by a provider, with rationale.       Overgrown Non-dystrophic Toenails:  Procedure Today:  cutting and filing of five toenails.  Rationale:  Overgrown toenails, as patient presented today, can lead to diabetic foot/toe ulcers, osteomyelitis, and lower limb amputations.   Informed consent obtained.  Using standard podiatry clippers and [place] boards, I cut and sanded five toenails.  No bleeding or discomfort during procedure.  Patient tolerated procedure without complications.                Return to clinic in 4 months.  Teaching provided on s/s to call wound clinic for promptly.  [facility] precautions taught for after hours and weekends.

## 2022-08-12 ENCOUNTER — OFFICE VISIT (OUTPATIENT)
Dept: CARDIOLOGY | Facility: CLINIC | Age: 57
End: 2022-08-12
Payer: MEDICARE

## 2022-08-12 DIAGNOSIS — R06.09 DOE (DYSPNEA ON EXERTION): ICD-10-CM

## 2022-08-12 DIAGNOSIS — I10 PRIMARY HYPERTENSION: Primary | ICD-10-CM

## 2022-08-12 DIAGNOSIS — E11.9 DIABETES MELLITUS WITHOUT COMPLICATION: ICD-10-CM

## 2022-08-12 DIAGNOSIS — G47.30 SLEEP APNEA, UNSPECIFIED TYPE: ICD-10-CM

## 2022-08-12 DIAGNOSIS — E78.5 HYPERLIPIDEMIA LDL GOAL <70: ICD-10-CM

## 2022-08-12 PROCEDURE — 99213 PR OFFICE/OUTPT VISIT, EST, LEVL III, 20-29 MIN: ICD-10-PCS | Mod: 95,,, | Performed by: NURSE PRACTITIONER

## 2022-08-12 PROCEDURE — 99213 OFFICE O/P EST LOW 20 MIN: CPT | Mod: 95,,, | Performed by: NURSE PRACTITIONER

## 2022-08-12 RX ORDER — ATORVASTATIN CALCIUM 40 MG/1
40 TABLET, FILM COATED ORAL DAILY
Qty: 90 TABLET | Refills: 3 | Status: SHIPPED | OUTPATIENT
Start: 2022-08-12 | End: 2023-08-14 | Stop reason: SDUPTHER

## 2022-08-12 RX ORDER — METOPROLOL TARTRATE 25 MG/1
25 TABLET, FILM COATED ORAL 2 TIMES DAILY
Qty: 180 TABLET | Refills: 3 | Status: SHIPPED | OUTPATIENT
Start: 2022-08-12 | End: 2023-08-14 | Stop reason: SDUPTHER

## 2022-08-12 NOTE — PROGRESS NOTES
CHIEF COMPLAINT:   Chief Complaint   Patient presents with    Follow-up     Patient has 3 mth f/u telemedicine w ECHO and FLP results. Pt c/o dyspnea on exertion. Pt c/o dizziness with sudden movement. Pt denies cp and palpitations. No questions.                   Review of patient's allergies indicates:   Allergen Reactions    Aspirin                                           HPI:  Gloria Denis 56 y.o. female with a past medical history of hypertension, hyperlipidemia, sleep apnea, diabetes mellitus, depression, anxiety, and chronic back pain.  Patient completed an echocardiogram on 2022 which revealed an ejection fraction of 60% with grade 1 diastolic dysfunction.  See report below.  She completed AMBER testing on July 15, 2022 which revealed no significant arterial flow reduction in the bilateral lower extremities.  See report below.   She completed a Lexiscan stress test in 2020 which revealed no ischemia. She also completed AMBER testing in 2020 which revealed no evidence of significant arterial insufficiency. She continues to ambulate with a cane due to chronic back pain.     Patient continues to endorse shortness of breath with exertion as well as occasional dizziness with sudden movements.  She denies chest pain, palpitations, orthopnea, or syncope.  She reports compliance with her medications.  She continues to ambulate with a cane due to chronic back pain.  She states her BiPAP was recalled and she is still awaiting a new machine.        This is a telemedicine note. I have reviewed the patient's name:Gloria Denis, :1965. I verbally reviewed the past medical history, active medication list, and allergy list with the patient and verified with a picture ID if applicable. I have verified that this patient is in the state of [place]. The patient has consented to be treated remotely by telemedicine appointment via [facility] approved interactive  audio format. The patient does have access to a working audiovisualformat. The patient stated that they understood and accepted the privacy and security risks to their information at their location.  Originating site (where the patient is located): Patient Home  Distant site (where the provider is located): [facility]    Time spent with the patient exceeds 10 minutes, over 50% of which was used for education and counseling regarding medical conditions, current medications including risk/benefit and side effects/adverse events, OTC uses/doses, home self care and contact precautions, and red flags and indications for immediate medical attention. The patient is receptive, expresses understanding, and is agreeable to plan. All questions answered.    Start time: 1108  End time: 1119        AMBER Testing 7.15.22:                                                                                                       The right lower extremity demonstrated multiphasic waveforms at all levels.   The AMBER on the right was normal at 1.15.  The TBI on the right was normal at 0.77.  The right lower extremity demonstrated no significant arterial flow reduction.     The left lower extremity demonstrated multiphasic waveforms at all levels.   The AMBER on the left was invalid due to non-compressible vessels at the ankle.  The TBI on the left was normal at 0.80.  The left lower extremity demonstrated no significant arterial flow reduction.                                                                                                                                                                                                                                                                                                    CARDIAC TESTING:  Results for orders placed during the hospital encounter of 06/30/22    Echo    Interpretation Summary  · The left ventricle is normal in size with concentric remodeling and normal systolic  function.  · The estimated ejection fraction is 60%.  · Grade I left ventricular diastolic dysfunction.  · Normal right ventricular size with normal right ventricular systolic function.  · Normal central venous pressure (3 mmHg).  · The estimated PA systolic pressure is 23 mmHg.  · IVC is normal.  · There is no pulmonary hypertension.    Lexiscan stress test 2020:  Scan is consistent with: No ischemia  No scar  Attenuation artifact in the inferolateral wall  Ejection fraction: 90%.    AMBER testing 2020:  No evidence of significant arterial insufficiency was identified in the study.    Echocardiogram 2019:  Left ventricular ejection fraction is measured at approximately 55-60%.  Normal right ventricular size with preserved RV function.  No significant valvular abnormalities.  There is trace tricuspid regurgitation with estimated RVSP of 24 mmHg.  No evidence of pericardial effusion.          Patient Active Problem List   Diagnosis    Mixed anxiety depressive disorder    Obesity    Chronic back pain    Diabetes mellitus    Diabetic neuropathy    Dyslipidemia    Hypertension    Sleep apnea    Sciatica    Chronic constipation    Gastroesophageal reflux disease    Screening for colon cancer    CKD (chronic kidney disease)    Chronic bilateral low back pain with bilateral sciatica    BMI 40.0-44.9, adult     Past Surgical History:   Procedure Laterality Date    ARTHROSCOPIC REMOVAL OF LOOSE BODY FROM JOINT       SECTION      CHOLECYSTECTOMY      Drainage of oral abscess      Exploration using laparoscope       Social History     Socioeconomic History    Marital status:      Spouse name: Kp   Tobacco Use    Smoking status: Never Smoker    Smokeless tobacco: Never Used   Substance and Sexual Activity    Alcohol use: Never    Drug use: Never    Sexual activity: Yes     Partners: Male     Social Determinants of Health     Transportation Needs: Unmet Transportation  Needs    Lack of Transportation (Medical): Yes    Lack of Transportation (Non-Medical): No   Housing Stability: Low Risk     Unable to Pay for Housing in the Last Year: No    Number of Places Lived in the Last Year: 1    Unstable Housing in the Last Year: No        Family History   Problem Relation Age of Onset    Cancer Mother     Heart disease Mother     Diabetes Father     Heart attack Father     Kidney failure Father     Stroke Father     Cancer Brother          Current Outpatient Medications:     atorvastatin (LIPITOR) 40 MG tablet, Take 40 mg by mouth once daily., Disp: , Rfl:     betamethasone dipropionate 0.05 % cream,  See Instructions, APPLY  CREAM TOPICALLY TWICE DAILY, # 60 gm, 0 Refill(s), Pharmacy: [id number], 155, cm, Height/Length Dosing, 21 8:39:00 CDT, 112, kg, Weight Dosing, 21 11:11:00 CDT, Disp: , Rfl:     DULoxetine (CYMBALTA) 30 MG capsule, Take 1 capsule (30 mg total) by mouth once daily at 6am., Disp: 90 capsule, Rfl: 3    DULoxetine (CYMBALTA) 60 MG capsule, Take 1 capsule (60 mg total) by mouth every evening., Disp: 90 capsule, Rfl: 3    enalapril (VASOTEC) 2.5 MG tablet, Take 1 tablet (2.5 mg total) by mouth once daily., Disp: 90 tablet, Rfl: 1    hydrOXYzine pamoate (VISTARIL) 25 MG Cap, Take 25 mg by mouth 3 (three) times daily as needed., Disp: , Rfl:     insulin detemir U-100 (LEVEMIR FLEXTOUCH U-100 INSULN) 100 unit/mL (3 mL) InPn pen, Inject 30 Units into the skin every evening., Disp: 27 mL, Rfl: 1    JARDIANCE 10 mg tablet, Take 1 tablet (10 mg total) by mouth every morning., Disp: 90 tablet, Rfl: 1    LINZESS 290 mcg Cap capsule, Take 1 capsule (290 mcg total) by mouth once daily., Disp: 90 capsule, Rfl: 3    LOKELMA 10 gram packet, SMARTSI Packet(s) By Mouth 3 Times a Week, Disp: 45 packet, Rfl: 0    metoprolol tartrate (LOPRESSOR) 25 MG tablet, Take 25 mg by mouth 2 (two) times daily., Disp: , Rfl:     omeprazole (PRILOSEC) 40 MG  "capsule, Take 40 mg by mouth as needed., Disp: , Rfl:     SITagliptan-metformin (JANUMET) 50-1,000 mg per tablet, Take 1 tablet by mouth 2 (two) times a day., Disp: 180 tablet, Rfl: 1    BD CAYLA 2ND GEN PEN NEEDLE 32 gauge x 5/32" Ndle, SMARTSIG:Injection Every Night, Disp: , Rfl:     conjugated estrogens (PREMARIN) vaginal cream,  See Instructions, Apply dime size amout to vaginal wall with a gloved finger x1 week then use 2x/week going forward. Do not use with intecourse., # 30 gm, 2 Refill(s), [facility], 155, cm, Height/Length Dosing, 07/08/21..., Disp: , Rfl:      ROS:                                                                                                                                                                             Review of Systems   Constitutional: Negative.    Respiratory: Positive for shortness of breath.    Cardiovascular: Negative.    Gastrointestinal: Negative.    Musculoskeletal: Positive for back pain.   Skin: Negative.    Neurological: Positive for dizziness.   Psychiatric/Behavioral: Negative.         There were no vitals taken for this visit.   PE:  Physical Exam  Constitutional:       Appearance: Normal appearance.   HENT:      Head: Normocephalic and atraumatic.   Eyes:      Extraocular Movements: Extraocular movements intact.      Pupils: Pupils are equal, round, and reactive to light.   Cardiovascular:      Rate and Rhythm: Normal rate and regular rhythm.   Pulmonary:      Effort: Pulmonary effort is normal.      Breath sounds: Normal breath sounds.   Abdominal:      Palpations: Abdomen is soft.   Musculoskeletal:         General: Normal range of motion.      Cervical back: Normal range of motion. No tenderness.   Skin:     General: Skin is warm and dry.   Neurological:      General: No focal deficit present.      Mental Status: She is alert and oriented to person, place, and time.   Psychiatric:         Mood and Affect: Mood normal.       "   Behavior: Behavior normal.        ASSESSMENT/PLAN:  Chest Pain - resolved  Lexiscan Stress test July 2020 - no ischemia    CROUCH   EF 60% per Echo June 2022  EF 55-60% per Echo June 2019    Hypertension  Reports home BP is WNL (110s/80s)  Continue enalapril and metoprolol tartrate  Counseled on low salt diet and increased activity as tolerated     Hyperlipidemia   LDL not at goal - 88  Continue Atorvastatin 40mg daily   Strongly encouraged a low cholesterol, low fat diet and increased activity as tolerated     Sleep apnea on BiPAP   Recommend nightly BiPAP use when patient she obtains a new BiPAP - BiPAP recalled and she has not yet received a new BiPAP machine    Diabetes mellitus  Management per PCP    Depression/Anxiety  Management per PCP    Chronic back pain  Continue management per PCP    Bilateral lower extremity pain  AMBER testing July 2022:  no significant arterial flow reduction in the bilateral lower extremities    Follow up in Cardiology Clinic in 4 months

## 2022-08-26 ENCOUNTER — OFFICE VISIT (OUTPATIENT)
Dept: GYNECOLOGY | Facility: CLINIC | Age: 57
End: 2022-08-26
Payer: MEDICARE

## 2022-08-26 VITALS
HEIGHT: 60 IN | BODY MASS INDEX: 45.43 KG/M2 | DIASTOLIC BLOOD PRESSURE: 71 MMHG | RESPIRATION RATE: 18 BRPM | HEART RATE: 68 BPM | SYSTOLIC BLOOD PRESSURE: 104 MMHG | TEMPERATURE: 98 F | WEIGHT: 231.38 LBS | OXYGEN SATURATION: 100 %

## 2022-08-26 DIAGNOSIS — E66.01 CLASS 3 SEVERE OBESITY WITH BODY MASS INDEX (BMI) OF 45.0 TO 49.9 IN ADULT, UNSPECIFIED OBESITY TYPE, UNSPECIFIED WHETHER SERIOUS COMORBIDITY PRESENT: ICD-10-CM

## 2022-08-26 DIAGNOSIS — Z01.419 ENCOUNTER FOR ANNUAL ROUTINE GYNECOLOGICAL EXAMINATION: Primary | ICD-10-CM

## 2022-08-26 PROCEDURE — G0101 PR CA SCREEN;PELVIC/BREAST EXAM: ICD-10-PCS | Mod: GZ,S$PBB,, | Performed by: NURSE PRACTITIONER

## 2022-08-26 PROCEDURE — G0101 CA SCREEN;PELVIC/BREAST EXAM: HCPCS | Mod: GZ,S$PBB,, | Performed by: NURSE PRACTITIONER

## 2022-08-26 PROCEDURE — 99215 OFFICE O/P EST HI 40 MIN: CPT | Mod: PBBFAC | Performed by: NURSE PRACTITIONER

## 2022-08-26 NOTE — PROGRESS NOTES
Subjective:       Patient ID: Gloria Denis is a 56 y.o. female.    Chief Complaint:  Well Woman    History of Present Illness  Pt is . Denies hx of cervical dysplasia. Last pap 2020-NIL and HPV neg. She is postmenopausal for 10+ years. Denies hot flashes. Denies any vaginal bleeding or discharge. Denies concern for STDs. Denies any abdominal or pelvic pain. Hx of dyspareunia, upon insertion. Has use Premarin cream and has since stopped states caused abdominal and vaginal pain with use. Review of chart reveals same complaints back to . Pt states she has use water base lubricant, Replens, Olive oil in past, no improvement. MG-22-BIRADS 1. Admits to occ. evangelina breast sharp pain. Denies any urinary complaints. Family hx of breast cancer in her mother (dx in her 60's). Denies any family hx of ovarian, uterine cancer. Cologuard ordered per PCP. DEXA WNL in 2022.    GYN & OB History  No LMP recorded. Patient is postmenopausal.     Review of patient's allergies indicates:   Allergen Reactions    Aspirin      Past Medical History:   Diagnosis Date    Anxiety disorder, unspecified     Chronic constipation     CKD (chronic kidney disease)     Depression     DM (diabetes mellitus)     Dyslipidemia     GERD (gastroesophageal reflux disease)     HTN (hypertension)     Morbid obesity     Sleep apnea, unspecified      OB History    Para Term  AB Living   2 2           SAB IAB Ectopic Multiple Live Births                  # Outcome Date GA Lbr Manoj/2nd Weight Sex Delivery Anes PTL Lv   2 Para            1 Para                 Review of Systems  Review of Systems    Negative except for pertinent findings for positives per HPI     Objective:    Physical Exam    /71 (BP Location: Left arm, Patient Position: Sitting, BP Method: Large (Automatic))   Pulse 68   Temp 97.9 °F (36.6 °C)   Resp 18   Ht 5' (1.524 m)   Wt 105 kg (231 lb 6.4 oz)   SpO2 100%   BMI 45.19 kg/m²    GENERAL: Well-developed female in no acute distress.  SKIN: Normal to inspection, warm and intact.  BREASTS: No masses, lumps, discharge, tenderness.  VULVA: General appearance normal; external genitalia with no lesions or erythema.  VAGINA: Mucosa normal, pink, no discharge or lesions.  CERVIX: Grossly normal, pink, no erythema or discharge.  BIMANUAL EXAM: Limited exam d/t body habitus. Rangel adnexa reveal no tenderness.  PSYCHIATRIC: Patient is oriented to person, place, and time. Mood and affect are normal.    Assessment:         1. Encounter for annual routine gynecological examination    2. Class 3 severe obesity with body mass index (BMI) of 45.0 to 49.9 in adult, unspecified obesity type, unspecified whether serious comorbidity present       Plan:   Gloria was seen today for well woman.    Diagnoses and all orders for this visit:    Encounter for annual routine gynecological examination    Class 3 severe obesity with body mass index (BMI) of 45.0 to 49.9 in adult, unspecified obesity type, unspecified whether serious comorbidity present    Pelvic today, pap utd per ACOG.  Healthy lifestyle choices. Importance of maintaining a healthy BMI. Healthy diet including limiting fast foods, processed foods, foods containing high amounts of saturated fats or high sodium content. Recommend low carb, low fat diet rich in lean meat and fish, non starchy vegetables, fruits and good fat while maintaining portion control. Limit sugar intake. Recommended plenty of water, avoiding carbonated beverages and high fructose corn syrup. Regular cardiovascular exercise, at least 150 minutes of cardiovascular exercise (at least 30 mins 5x/week).  Follow up in about 1 year (around 8/26/2023) for annual exam.

## 2022-09-06 ENCOUNTER — TELEPHONE (OUTPATIENT)
Dept: GASTROENTEROLOGY | Facility: CLINIC | Age: 57
End: 2022-09-06
Payer: MEDICARE

## 2022-09-06 DIAGNOSIS — Z12.11 SCREENING FOR COLON CANCER: Primary | ICD-10-CM

## 2022-09-06 NOTE — TELEPHONE ENCOUNTER
----- Message from ALYX Love sent at 9/6/2022  1:52 PM CDT -----  Regarding: Cancelled labs  FIT testing reordered. Previously cancelled by lab. Thanks

## 2022-10-14 PROCEDURE — 82274 ASSAY TEST FOR BLOOD FECAL: CPT | Performed by: NURSE PRACTITIONER

## 2022-10-18 LAB — MAYO GENERIC ORDERABLE RESULT: NORMAL

## 2022-10-23 ENCOUNTER — HOSPITAL ENCOUNTER (EMERGENCY)
Facility: HOSPITAL | Age: 57
Discharge: HOME | End: 2022-10-23
Attending: FAMILY MEDICINE
Payer: MEDICARE

## 2022-10-23 VITALS
RESPIRATION RATE: 20 BRPM | TEMPERATURE: 97 F | DIASTOLIC BLOOD PRESSURE: 86 MMHG | OXYGEN SATURATION: 98 % | HEART RATE: 76 BPM | SYSTOLIC BLOOD PRESSURE: 123 MMHG

## 2022-10-23 DIAGNOSIS — M54.50 BILATERAL LOW BACK PAIN WITHOUT SCIATICA, UNSPECIFIED CHRONICITY: Primary | ICD-10-CM

## 2022-10-23 PROCEDURE — 96372 THER/PROPH/DIAG INJ SC/IM: CPT | Performed by: NURSE PRACTITIONER

## 2022-10-23 PROCEDURE — 63600175 PHARM REV CODE 636 W HCPCS: Performed by: NURSE PRACTITIONER

## 2022-10-23 PROCEDURE — 99284 EMERGENCY DEPT VISIT MOD MDM: CPT | Mod: 25

## 2022-10-23 RX ORDER — TRAMADOL HYDROCHLORIDE 50 MG/1
50 TABLET ORAL EVERY 12 HOURS PRN
Qty: 10 TABLET | Refills: 0 | Status: SHIPPED | OUTPATIENT
Start: 2022-10-23 | End: 2022-10-28

## 2022-10-23 RX ORDER — BACLOFEN 10 MG/1
10 TABLET ORAL 2 TIMES DAILY
Qty: 14 TABLET | Refills: 0 | Status: SHIPPED | OUTPATIENT
Start: 2022-10-23 | End: 2023-02-01 | Stop reason: SDUPTHER

## 2022-10-23 RX ORDER — DEXAMETHASONE SODIUM PHOSPHATE 4 MG/ML
4 INJECTION, SOLUTION INTRA-ARTICULAR; INTRALESIONAL; INTRAMUSCULAR; INTRAVENOUS; SOFT TISSUE
Status: COMPLETED | OUTPATIENT
Start: 2022-10-23 | End: 2022-10-23

## 2022-10-23 RX ADMIN — DEXAMETHASONE SODIUM PHOSPHATE 4 MG: 4 INJECTION, SOLUTION INTRA-ARTICULAR; INTRALESIONAL; INTRAMUSCULAR; INTRAVENOUS; SOFT TISSUE at 01:10

## 2022-10-23 NOTE — ED PROVIDER NOTES
Encounter Date: 10/23/2022       History   No chief complaint on file.    Pt is a 56 y.o. female who presents to the [facility] ED complaining of low back pain which has flared over the last few days. Pt has history of chronic back issues. Denies relief from home medication. Denies trauma to back, chest pain, SOB, weakness, dizziness, fever, or loss of bowel or bladder control. Pt came to ED tonight because her  had also came to the ED for care. Voicing concerns that the medicine that she receives does not make her sleepy so she can drive home.     Review of patient's allergies indicates:   Allergen Reactions    Aspirin      Past Medical History:   Diagnosis Date    Anxiety disorder, unspecified     Chronic constipation     CKD (chronic kidney disease)     Depression     DM (diabetes mellitus)     Dyslipidemia     GERD (gastroesophageal reflux disease)     HTN (hypertension)     Morbid obesity     Sleep apnea, unspecified      Past Surgical History:   Procedure Laterality Date    ARTHROSCOPIC REMOVAL OF LOOSE BODY FROM JOINT       SECTION      CHOLECYSTECTOMY      Drainage of oral abscess      Exploration using laparoscope       Family History   Problem Relation Age of Onset    Cancer Mother     Heart disease Mother     Diabetes Father     Heart attack Father     Kidney failure Father     Stroke Father     Cancer Brother      Social History     Tobacco Use    Smoking status: Never    Smokeless tobacco: Never   Substance Use Topics    Alcohol use: Never    Drug use: Never     Review of Systems   Constitutional:  Negative for chills, diaphoresis, fatigue and fever.   HENT:  Negative for facial swelling, rhinorrhea, sinus pressure, sinus pain, sore throat and trouble swallowing.    Respiratory:  Negative for cough, chest tightness, shortness of breath and wheezing.    Cardiovascular:  Negative for chest pain, palpitations and leg swelling.   Gastrointestinal:  Negative for abdominal pain, diarrhea, nausea and  vomiting.   Genitourinary:  Negative for dysuria, flank pain, frequency, hematuria and urgency.   Musculoskeletal:  Positive for back pain. Negative for arthralgias, joint swelling and myalgias.   Skin:  Negative for color change and rash.   Neurological:  Negative for dizziness, syncope, weakness and light-headedness.   Hematological:  Does not bruise/bleed easily.   All other systems reviewed and are negative.    Physical Exam     Initial Vitals   BP Pulse Resp Temp SpO2   -- -- -- -- --      MAP       --         Physical Exam    Nursing note and vitals reviewed.  Constitutional: She appears well-developed and well-nourished.   HENT:   Head: Normocephalic and atraumatic.   Nose: Nose normal.   Mouth/Throat: Oropharynx is clear and moist.   Eyes: Conjunctivae and EOM are normal. Pupils are equal, round, and reactive to light.   Neck: Neck supple.   Normal range of motion.  Cardiovascular:  Normal rate, regular rhythm, normal heart sounds and intact distal pulses.           Pulmonary/Chest: Effort normal and breath sounds normal. No respiratory distress. She has no wheezes. She has no rhonchi. She has no rales. She exhibits no tenderness.   Abdominal: Abdomen is soft and flat. Bowel sounds are normal. She exhibits no distension. There is no abdominal tenderness. There is no rebound, no guarding, no tenderness at McBurney's point and negative Shah's sign. negative psoas sign  Musculoskeletal:         General: Normal range of motion.      Cervical back: Normal range of motion and neck supple.      Lumbar back: Spasms and tenderness present. No swelling, edema, deformity or signs of trauma. Normal range of motion.        Back:      Neurological: She is alert and oriented to person, place, and time. She has normal strength and normal reflexes.   Skin: Skin is warm and dry. Capillary refill takes less than 2 seconds.   Psychiatric: She has a normal mood and affect. Her speech is normal and behavior is normal. Judgment  and thought content normal.       ED Course   Procedures  Labs Reviewed - No data to display       Imaging Results    None          Medications   dexAMETHasone injection 4 mg (has no administration in time range)     Medical Decision Making:   Differential Diagnosis:   Low back pain  ED Management:  Discussed with patient all pertinent ED information and results. Discussed diagnosis and treatment plan with patient. Follow up instructions and return to ED instruction have been given. All questions and concerns were addressed at this time. Patient voices understanding of information and instructions. Patient is comfortable with plan and discharge. Patient is stable for discharge.                           Clinical Impression:   Final diagnoses:  [M54.50] Bilateral low back pain without sciatica, unspecified chronicity (Primary)      ED Disposition Condition    Discharge Stable          ED Prescriptions       Medication Sig Dispense Start Date End Date Auth. Provider    traMADoL (ULTRAM) 50 mg tablet Take 1 tablet (50 mg total) by mouth every 12 (twelve) hours as needed for Pain. 10 tablet 10/23/2022 10/28/2022 ALYX Granger Jr.    baclofen (LIORESAL) 10 MG tablet Take 1 tablet (10 mg total) by mouth 2 (two) times daily. for 7 days 14 tablet 10/23/2022 10/30/2022 ALYX Granger Jr.          Follow-up Information       Follow up With Specialties Details Why Contact Info    ALYX Foreman Nurse Practitioner In 1 week  [address] [facility] [address]  Internal Medicine Clinic  [facility] [address]  [phone]      [facility] Emergency Medicine In 3 days As needed, If symptoms worsen [address] [facility] [address] [phone]             ALYX Granger Jr.  10/23/22 0144

## 2022-10-27 RX ORDER — INSULIN DETEMIR 100 [IU]/ML
30 INJECTION, SOLUTION SUBCUTANEOUS NIGHTLY
Qty: 27 ML | Refills: 1 | OUTPATIENT
Start: 2022-10-27 | End: 2023-04-25

## 2022-11-30 ENCOUNTER — DOCUMENTATION ONLY (OUTPATIENT)
Dept: ADMINISTRATIVE | Facility: HOSPITAL | Age: 57
End: 2022-11-30
Payer: MEDICARE

## 2022-12-13 ENCOUNTER — HOSPITAL ENCOUNTER (OUTPATIENT)
Dept: WOUND CARE | Facility: HOSPITAL | Age: 57
Discharge: HOME | End: 2022-12-13
Attending: NURSE PRACTITIONER
Payer: MEDICARE

## 2022-12-13 VITALS — SYSTOLIC BLOOD PRESSURE: 115 MMHG | DIASTOLIC BLOOD PRESSURE: 76 MMHG | HEART RATE: 79 BPM

## 2022-12-13 DIAGNOSIS — E11.3292 TYPE 2 DIABETES MELLITUS WITH LEFT EYE AFFECTED BY MILD NONPROLIFERATIVE RETINOPATHY WITHOUT MACULAR EDEMA, WITHOUT LONG-TERM CURRENT USE OF INSULIN: Primary | ICD-10-CM

## 2022-12-13 PROCEDURE — 99212 PR OFFICE/OUTPT VISIT, EST, LEVL II, 10-19 MIN: ICD-10-PCS | Mod: ,,, | Performed by: NURSE PRACTITIONER

## 2022-12-13 PROCEDURE — 99211 OFF/OP EST MAY X REQ PHY/QHP: CPT

## 2022-12-13 PROCEDURE — 99212 OFFICE O/P EST SF 10 MIN: CPT | Mod: ,,, | Performed by: NURSE PRACTITIONER

## 2022-12-13 NOTE — PROGRESS NOTES
TODAY'S VISIT NOTE WAS IMPORTED FROM LAST WOUND CLINIC VISIT OF 2022.  I ATTEST THAT I REVIEWED THE HPI, ROS, LABS, WOUND-RELATED IMAGING, PHYSICAL EXAMINATION, EVALUATION AND PLAN SECTIONS OF IMPORTED NOTE AND REVISED TODAY'S VISIT NOTE TO REFLECT TODAY'S NEW ASSESSMENT FINDINGS AND TODAY'S UPDATES TO WOUND TREATMENT PLAN.          CHIEF COMPLAINT:    Diabetic foot care      HISTORY OF  PRESENT ILLNESS:  57 y.o. Black or  female being seen today for routine diabetic foot care.   Denies hx of diabetic foot ulcers, amputations, pain in thighs/calves with walking and laying down, numbness, tingling, and pain in feet and toes.   Has not cut toenails since her visit on 2022.  Toenails grow very slowly.  No calluses or pain in feet/toes.  Never smoker.  No vascular hx.  Followed by ALYX Robison for PCP. History includes:        Past Medical History:   Diagnosis Date    Anxiety disorder, unspecified     Chronic constipation     CKD (chronic kidney disease)     Depression     DM (diabetes mellitus)     Dyslipidemia     GERD (gastroesophageal reflux disease)     HTN (hypertension)     Morbid obesity     Sleep apnea, unspecified       Past Surgical History:   Procedure Laterality Date    ARTHROSCOPIC REMOVAL OF LOOSE BODY FROM JOINT       SECTION      CHOLECYSTECTOMY      Drainage of oral abscess      Exploration using laparoscope        Social History     Socioeconomic History    Marital status:      Spouse name: Kp   Tobacco Use    Smoking status: Never    Smokeless tobacco: Never   Substance and Sexual Activity    Alcohol use: Never    Drug use: Never    Sexual activity: Not Currently     Partners: Male     Birth control/protection: Post-menopausal     Social Determinants of Health     Transportation Needs: Unmet Transportation Needs    Lack of Transportation (Medical): Yes    Lack of Transportation (Non-Medical): No   Housing Stability: Low Risk     Unable to Pay for  "Housing in the Last Year: No    Number of Places Lived in the Last Year: 1    Unstable Housing in the Last Year: No         Review of Most Recent Wound Care-Related Labs:  Lab Results   Component Value Date/Time    WBC 6.9 06/22/2022 08:06 AM    RBC 3.95 (L) 06/22/2022 08:06 AM    HGB 11.4 (L) 06/22/2022 08:06 AM    HCT 35.7 (L) 06/22/2022 08:06 AM    MCV 90.4 06/22/2022 08:06 AM    MCH 28.9 06/22/2022 08:06 AM    CREATININE 1.49 (H) 06/22/2022 08:06 AM    HGBA1C 6.8 06/22/2022 08:06 AM    CRP 0.7 05/17/2017 07:40 AM        REVIEW OF SYSTEMS:  Except as stated in HPI, all other 10 body systems normal.     Current Outpatient Medications   Medication Sig Dispense Refill    atorvastatin (LIPITOR) 40 MG tablet Take 1 tablet (40 mg total) by mouth once daily. 90 tablet 3    baclofen (LIORESAL) 10 MG tablet Take 1 tablet (10 mg total) by mouth 2 (two) times daily. for 7 days 14 tablet 0    BD CAYLA 2ND GEN PEN NEEDLE 32 gauge x 5/32" Ndle SMARTSIG:Injection Every Night      betamethasone dipropionate 0.05 % cream   See Instructions, APPLY  CREAM TOPICALLY TWICE DAILY, # 60 gm, 0 Refill(s), Pharmacy: [id number], 155, cm, Height/Length Dosing, 07/08/21 8:39:00 CDT, 112, kg, Weight Dosing, 07/08/21 11:11:00 CDT      conjugated estrogens (PREMARIN) vaginal cream   See Instructions, Apply dime size amout to vaginal wall with a gloved finger x1 week then use 2x/week going forward. Do not use with intecourse., # 30 gm, 2 Refill(s), Pharmacy: [facility], 155, cm, Height/Length Dosing, 07/08/21...      DULoxetine (CYMBALTA) 30 MG capsule Take 1 capsule (30 mg total) by mouth once daily at 6am. 90 capsule 3    DULoxetine (CYMBALTA) 60 MG capsule Take 1 capsule (60 mg total) by mouth every evening. 90 capsule 3    enalapril (VASOTEC) 2.5 MG tablet Take 1 tablet (2.5 mg total) by mouth once daily. 90 tablet 1    insulin detemir U-100 (LEVEMIR FLEXTOUCH U-100 INSULN) 100 unit/mL (3 mL) InPn pen Inject 30 " Units into the skin every evening. 27 mL 1    JARDIANCE 10 mg tablet Take 1 tablet (10 mg total) by mouth every morning. 90 tablet 1    LINZESS 290 mcg Cap capsule Take 1 capsule (290 mcg total) by mouth once daily. 90 capsule 3    LOKELMA 10 gram packet SMARTSI Packet(s) By Mouth 3 Times a Week 45 packet 0    metoprolol tartrate (LOPRESSOR) 25 MG tablet Take 1 tablet (25 mg total) by mouth 2 (two) times daily. 180 tablet 3    omeprazole (PRILOSEC) 40 MG capsule Take 40 mg by mouth as needed.      SITagliptan-metformin (JANUMET) 50-1,000 mg per tablet Take 1 tablet by mouth 2 (two) times a day. 180 tablet 1     No current facility-administered medications for this encounter.         PHYSICAL EXAMINATION AND VITAL SIGNS:    Vitals:    22 1059   BP: 115/76   Pulse: 79       There is no height or weight on file to calculate BMI.    General:  VSS.  Morbidly obese, in no acute distress.   Respiratory: Breathing even, quiet, and unlabored.  No coughing.  Cardiovascular:  No peripheral edema.  Bilateral DP pulses palpable.   Bilateral PT pulses dopplered.  No hemosiderin staining or varicosities.  No hair distrubition over BLE or toes.  Toenails short, without dystrophic changes.   No pigmentary or color changes in lower legs and feet/toes.  Musculoskeletal:  Full range of motion of all extremities; no joint deformities or edema.  No bunions or hammertoes.  Normal dorsiflexion and flexion of bilateral ankles and hallux toes.  Normal intrinsic muscle ROM.    Neurologic:  A&O X 3.  Cranial nerves grossly intact.   Normal monofilament testing.  Psychiatric:  Calm, cooperative.  Mood and effect normal.  Responses appropriate.   Integumentary:      No rashes, lesions, or skin breakdown.  No calluses.   Skin over dorsal and plantar feet well-hydrated.     Toenails short and without thickening.                    ASSESSMENT AND PLAN:    Type 2 diabetes, with CKD:  Diabetes well-controlled.  Being managed by PCP.  No LOS  identified on monofilament testing.  Pedal pulses present.   Ms. Denis does not meet Medicare criteria for routine diabetic foot care services.  However, instructed her to call wound clinic should she be experiencing any pain or skin issues in feet/toes, as a result of overgrown toenails and we would schedule her for an appointment then.  She indicated toenails grow very slowly and she would be able to have a family member file them down with an [facility] board, as instructed, today.  Diabetic foot care teaching reinforced.  [facility] booklet on care of the insensate foot given to her today, and reviewed with her, in clinic.    Communication sent to PCP, ALYX Foreman, regarding today's visit.               We will see Ms. Denis on an as-needed basis for any future wound-related needs.

## 2022-12-22 ENCOUNTER — DOCUMENTATION ONLY (OUTPATIENT)
Dept: NEPHROLOGY | Facility: CLINIC | Age: 57
End: 2022-12-22
Payer: MEDICARE

## 2023-01-09 ENCOUNTER — HOSPITAL ENCOUNTER (OUTPATIENT)
Facility: HOSPITAL | Age: 58
Discharge: HOME | End: 2023-01-10
Attending: INTERNAL MEDICINE | Admitting: INTERNAL MEDICINE
Payer: MEDICARE

## 2023-01-09 ENCOUNTER — OFFICE VISIT (OUTPATIENT)
Dept: GASTROENTEROLOGY | Facility: CLINIC | Age: 58
End: 2023-01-09
Payer: MEDICARE

## 2023-01-09 VITALS
HEART RATE: 94 BPM | SYSTOLIC BLOOD PRESSURE: 107 MMHG | DIASTOLIC BLOOD PRESSURE: 67 MMHG | WEIGHT: 225 LBS | HEIGHT: 60 IN | BODY MASS INDEX: 44.17 KG/M2 | RESPIRATION RATE: 16 BRPM | TEMPERATURE: 98 F | OXYGEN SATURATION: 99 %

## 2023-01-09 DIAGNOSIS — L03.90 CELLULITIS, UNSPECIFIED CELLULITIS SITE: ICD-10-CM

## 2023-01-09 DIAGNOSIS — K21.9 GASTROESOPHAGEAL REFLUX DISEASE, UNSPECIFIED WHETHER ESOPHAGITIS PRESENT: Primary | ICD-10-CM

## 2023-01-09 DIAGNOSIS — K59.09 CHRONIC CONSTIPATION: ICD-10-CM

## 2023-01-09 DIAGNOSIS — R73.9 HYPERGLYCEMIA: ICD-10-CM

## 2023-01-09 DIAGNOSIS — M79.89 PAIN AND SWELLING OF LOWER LEG: ICD-10-CM

## 2023-01-09 DIAGNOSIS — M79.669 PAIN AND SWELLING OF LOWER LEG: ICD-10-CM

## 2023-01-09 DIAGNOSIS — L03.818 CELLULITIS OF OTHER SPECIFIED SITE: Primary | ICD-10-CM

## 2023-01-09 DIAGNOSIS — Z12.11 SCREENING FOR COLON CANCER: ICD-10-CM

## 2023-01-09 LAB
ANION GAP SERPL CALC-SCNC: 10 MEQ/L
BASOPHILS # BLD AUTO: 0.11 X10(3)/MCL (ref 0–0.2)
BASOPHILS NFR BLD AUTO: 0.7 %
BUN SERPL-MCNC: 28.8 MG/DL (ref 9.8–20.1)
CALCIUM SERPL-MCNC: 10.2 MG/DL (ref 8.4–10.2)
CHLORIDE SERPL-SCNC: 102 MMOL/L (ref 98–107)
CO2 SERPL-SCNC: 22 MMOL/L (ref 22–29)
CREAT SERPL-MCNC: 1.68 MG/DL (ref 0.55–1.02)
CREAT/UREA NIT SERPL: 17
CRP SERPL-MCNC: 213.5 MG/L
EOSINOPHIL # BLD AUTO: 0.15 X10(3)/MCL (ref 0–0.9)
EOSINOPHIL NFR BLD AUTO: 1 %
ERYTHROCYTE [DISTWIDTH] IN BLOOD BY AUTOMATED COUNT: 13 % (ref 11–14.5)
ERYTHROCYTE [SEDIMENTATION RATE] IN BLOOD: 79 MM/HR (ref 0–20)
GFR SERPLBLD CREATININE-BSD FMLA CKD-EPI: 35 MLS/MIN/1.73/M2
GLUCOSE SERPL-MCNC: 283 MG/DL (ref 74–100)
HCT VFR BLD AUTO: 37.7 % (ref 37–47)
HGB BLD-MCNC: 12.2 GM/DL (ref 12–16)
IMM GRANULOCYTES # BLD AUTO: 0.06 X10(3)/MCL (ref 0–0.04)
IMM GRANULOCYTES NFR BLD AUTO: 0.4 %
LACTATE SERPL-SCNC: 1.5 MMOL/L (ref 0.5–2.2)
LYMPHOCYTES # BLD AUTO: 2.7 X10(3)/MCL (ref 0.6–4.6)
LYMPHOCYTES NFR BLD AUTO: 18.4 %
MCH RBC QN AUTO: 28.6 PG
MCHC RBC AUTO-ENTMCNC: 32.4 MG/DL (ref 33–36)
MCV RBC AUTO: 88.3 FL (ref 80–94)
MONOCYTES # BLD AUTO: 1.09 X10(3)/MCL (ref 0.1–1.3)
MONOCYTES NFR BLD AUTO: 7.4 %
NEUTROPHILS # BLD AUTO: 10.59 X10(3)/MCL (ref 2.1–9.2)
NEUTROPHILS NFR BLD AUTO: 72.1 %
NRBC BLD AUTO-RTO: 0 % (ref 0–1)
PLATELET # BLD AUTO: 313 X10(3)/MCL (ref 140–371)
PMV BLD AUTO: 9.9 FL (ref 9.4–12.4)
POCT GLUCOSE: 190 MG/DL (ref 70–110)
POCT GLUCOSE: 315 MG/DL (ref 70–110)
POCT GLUCOSE: 352 MG/DL (ref 70–110)
POTASSIUM SERPL-SCNC: 4.6 MMOL/L (ref 3.5–5.1)
RBC # BLD AUTO: 4.27 X10(6)/MCL (ref 4.2–5.4)
SODIUM SERPL-SCNC: 134 MMOL/L (ref 136–145)
WBC # SPEC AUTO: 14.7 X10(3)/MCL (ref 4.5–11.5)

## 2023-01-09 PROCEDURE — G0378 HOSPITAL OBSERVATION PER HR: HCPCS

## 2023-01-09 PROCEDURE — 85651 RBC SED RATE NONAUTOMATED: CPT | Performed by: PHYSICIAN ASSISTANT

## 2023-01-09 PROCEDURE — 96367 TX/PROPH/DG ADDL SEQ IV INF: CPT

## 2023-01-09 PROCEDURE — 63600175 PHARM REV CODE 636 W HCPCS: Performed by: STUDENT IN AN ORGANIZED HEALTH CARE EDUCATION/TRAINING PROGRAM

## 2023-01-09 PROCEDURE — 96365 THER/PROPH/DIAG IV INF INIT: CPT

## 2023-01-09 PROCEDURE — 83605 ASSAY OF LACTIC ACID: CPT | Performed by: PHYSICIAN ASSISTANT

## 2023-01-09 PROCEDURE — 87040 BLOOD CULTURE FOR BACTERIA: CPT | Mod: 59 | Performed by: PHYSICIAN ASSISTANT

## 2023-01-09 PROCEDURE — 96372 THER/PROPH/DIAG INJ SC/IM: CPT | Performed by: STUDENT IN AN ORGANIZED HEALTH CARE EDUCATION/TRAINING PROGRAM

## 2023-01-09 PROCEDURE — 25000003 PHARM REV CODE 250: Performed by: STUDENT IN AN ORGANIZED HEALTH CARE EDUCATION/TRAINING PROGRAM

## 2023-01-09 PROCEDURE — 96372 THER/PROPH/DIAG INJ SC/IM: CPT | Mod: 59

## 2023-01-09 PROCEDURE — 96366 THER/PROPH/DIAG IV INF ADDON: CPT

## 2023-01-09 PROCEDURE — 80048 BASIC METABOLIC PNL TOTAL CA: CPT | Performed by: PHYSICIAN ASSISTANT

## 2023-01-09 PROCEDURE — 25000003 PHARM REV CODE 250: Performed by: PHYSICIAN ASSISTANT

## 2023-01-09 PROCEDURE — 99285 EMERGENCY DEPT VISIT HI MDM: CPT | Mod: 25,27

## 2023-01-09 PROCEDURE — 99215 OFFICE O/P EST HI 40 MIN: CPT | Mod: PBBFAC,25 | Performed by: NURSE PRACTITIONER

## 2023-01-09 PROCEDURE — 99214 OFFICE O/P EST MOD 30 MIN: CPT | Mod: S$PBB,,, | Performed by: NURSE PRACTITIONER

## 2023-01-09 PROCEDURE — 63600175 PHARM REV CODE 636 W HCPCS: Performed by: PHYSICIAN ASSISTANT

## 2023-01-09 PROCEDURE — 85025 COMPLETE CBC W/AUTO DIFF WBC: CPT | Performed by: PHYSICIAN ASSISTANT

## 2023-01-09 PROCEDURE — 99214 PR OFFICE/OUTPT VISIT, EST, LEVL IV, 30-39 MIN: ICD-10-PCS | Mod: S$PBB,,, | Performed by: NURSE PRACTITIONER

## 2023-01-09 PROCEDURE — 63600175 PHARM REV CODE 636 W HCPCS

## 2023-01-09 PROCEDURE — 86140 C-REACTIVE PROTEIN: CPT | Performed by: PHYSICIAN ASSISTANT

## 2023-01-09 PROCEDURE — 96361 HYDRATE IV INFUSION ADD-ON: CPT

## 2023-01-09 PROCEDURE — 82962 GLUCOSE BLOOD TEST: CPT | Mod: 91

## 2023-01-09 RX ORDER — ATORVASTATIN CALCIUM 40 MG/1
40 TABLET, FILM COATED ORAL DAILY
Status: DISCONTINUED | OUTPATIENT
Start: 2023-01-10 | End: 2023-01-10 | Stop reason: HOSPADM

## 2023-01-09 RX ORDER — IBUPROFEN 200 MG
24 TABLET ORAL
Status: DISCONTINUED | OUTPATIENT
Start: 2023-01-09 | End: 2023-01-10 | Stop reason: HOSPADM

## 2023-01-09 RX ORDER — CLINDAMYCIN PHOSPHATE 600 MG/50ML
600 INJECTION, SOLUTION INTRAVENOUS
Status: DISCONTINUED | OUTPATIENT
Start: 2023-01-09 | End: 2023-01-10 | Stop reason: HOSPADM

## 2023-01-09 RX ORDER — DULOXETIN HYDROCHLORIDE 30 MG/1
60 CAPSULE, DELAYED RELEASE ORAL NIGHTLY
Status: DISCONTINUED | OUTPATIENT
Start: 2023-01-09 | End: 2023-01-10 | Stop reason: HOSPADM

## 2023-01-09 RX ORDER — LISINOPRIL 2.5 MG/1
2.5 TABLET ORAL DAILY
Status: DISCONTINUED | OUTPATIENT
Start: 2023-01-10 | End: 2023-01-10 | Stop reason: HOSPADM

## 2023-01-09 RX ORDER — INSULIN GLARGINE 100 [IU]/ML
30 INJECTION, SOLUTION SUBCUTANEOUS NIGHTLY
Status: DISCONTINUED | OUTPATIENT
Start: 2023-01-09 | End: 2023-01-09

## 2023-01-09 RX ORDER — POLYETHYLENE GLYCOL 3350 17 G/17G
17 POWDER, FOR SOLUTION ORAL DAILY
Status: DISCONTINUED | OUTPATIENT
Start: 2023-01-09 | End: 2023-01-10 | Stop reason: HOSPADM

## 2023-01-09 RX ORDER — OMEPRAZOLE 40 MG/1
40 CAPSULE, DELAYED RELEASE ORAL DAILY
Qty: 30 CAPSULE | Refills: 5 | Status: SHIPPED | OUTPATIENT
Start: 2023-01-09 | End: 2023-07-17 | Stop reason: SDUPTHER

## 2023-01-09 RX ORDER — PANTOPRAZOLE SODIUM 40 MG/1
40 TABLET, DELAYED RELEASE ORAL DAILY
Status: DISCONTINUED | OUTPATIENT
Start: 2023-01-09 | End: 2023-01-10 | Stop reason: HOSPADM

## 2023-01-09 RX ORDER — METOPROLOL TARTRATE 25 MG/1
25 TABLET, FILM COATED ORAL 2 TIMES DAILY
Status: DISCONTINUED | OUTPATIENT
Start: 2023-01-09 | End: 2023-01-10 | Stop reason: HOSPADM

## 2023-01-09 RX ORDER — HYDROCODONE BITARTRATE AND ACETAMINOPHEN 5; 325 MG/1; MG/1
1 TABLET ORAL EVERY 6 HOURS PRN
Status: DISCONTINUED | OUTPATIENT
Start: 2023-01-09 | End: 2023-01-10 | Stop reason: HOSPADM

## 2023-01-09 RX ORDER — ACETAMINOPHEN 325 MG/1
650 TABLET ORAL EVERY 4 HOURS PRN
Status: DISCONTINUED | OUTPATIENT
Start: 2023-01-09 | End: 2023-01-10 | Stop reason: HOSPADM

## 2023-01-09 RX ORDER — SODIUM CHLORIDE 0.9 % (FLUSH) 0.9 %
10 SYRINGE (ML) INJECTION EVERY 12 HOURS PRN
Status: DISCONTINUED | OUTPATIENT
Start: 2023-01-09 | End: 2023-01-10 | Stop reason: HOSPADM

## 2023-01-09 RX ORDER — SODIUM CHLORIDE 9 MG/ML
INJECTION, SOLUTION INTRAVENOUS CONTINUOUS
Status: DISCONTINUED | OUTPATIENT
Start: 2023-01-09 | End: 2023-01-10 | Stop reason: HOSPADM

## 2023-01-09 RX ORDER — GLUCAGON 1 MG
1 KIT INJECTION
Status: DISCONTINUED | OUTPATIENT
Start: 2023-01-09 | End: 2023-01-10 | Stop reason: HOSPADM

## 2023-01-09 RX ORDER — IBUPROFEN 200 MG
16 TABLET ORAL
Status: DISCONTINUED | OUTPATIENT
Start: 2023-01-09 | End: 2023-01-10 | Stop reason: HOSPADM

## 2023-01-09 RX ORDER — HEPARIN SODIUM 5000 [USP'U]/ML
5000 INJECTION, SOLUTION INTRAVENOUS; SUBCUTANEOUS EVERY 12 HOURS
Status: DISCONTINUED | OUTPATIENT
Start: 2023-01-09 | End: 2023-01-10 | Stop reason: HOSPADM

## 2023-01-09 RX ORDER — INSULIN ASPART 100 [IU]/ML
0-5 INJECTION, SOLUTION INTRAVENOUS; SUBCUTANEOUS
Status: DISCONTINUED | OUTPATIENT
Start: 2023-01-09 | End: 2023-01-10 | Stop reason: HOSPADM

## 2023-01-09 RX ORDER — ACETAMINOPHEN 325 MG/1
650 TABLET ORAL EVERY 8 HOURS PRN
Status: DISCONTINUED | OUTPATIENT
Start: 2023-01-09 | End: 2023-01-10 | Stop reason: HOSPADM

## 2023-01-09 RX ADMIN — PIPERACILLIN AND TAZOBACTAM 4.5 G: 4; .5 INJECTION, POWDER, LYOPHILIZED, FOR SOLUTION INTRAVENOUS; PARENTERAL at 12:01

## 2023-01-09 RX ADMIN — INSULIN DETEMIR 30 UNITS: 100 INJECTION, SOLUTION SUBCUTANEOUS at 10:01

## 2023-01-09 RX ADMIN — CLINDAMYCIN PHOSPHATE 600 MG: 600 INJECTION, SOLUTION INTRAVENOUS at 11:01

## 2023-01-09 RX ADMIN — DULOXETINE 60 MG: 30 CAPSULE, DELAYED RELEASE ORAL at 10:01

## 2023-01-09 RX ADMIN — METOPROLOL TARTRATE 25 MG: 25 TABLET, FILM COATED ORAL at 10:01

## 2023-01-09 RX ADMIN — CLINDAMYCIN PHOSPHATE 600 MG: 600 INJECTION, SOLUTION INTRAVENOUS at 01:01

## 2023-01-09 RX ADMIN — INSULIN ASPART 2 UNITS: 100 INJECTION, SOLUTION INTRAVENOUS; SUBCUTANEOUS at 10:01

## 2023-01-09 RX ADMIN — HYDROCODONE BITARTRATE AND ACETAMINOPHEN 1 TABLET: 5; 325 TABLET ORAL at 01:01

## 2023-01-09 RX ADMIN — POLYETHYLENE GLYCOL 3350 17 G: 17 POWDER, FOR SOLUTION ORAL at 02:01

## 2023-01-09 RX ADMIN — PANTOPRAZOLE SODIUM 40 MG: 40 TABLET, DELAYED RELEASE ORAL at 01:01

## 2023-01-09 RX ADMIN — INSULIN ASPART 3 UNITS: 100 INJECTION, SOLUTION INTRAVENOUS; SUBCUTANEOUS at 05:01

## 2023-01-09 RX ADMIN — SODIUM CHLORIDE 3000 ML: 9 INJECTION, SOLUTION INTRAVENOUS at 12:01

## 2023-01-09 RX ADMIN — CEFEPIME 2 G: 2 INJECTION, POWDER, FOR SOLUTION INTRAVENOUS at 02:01

## 2023-01-09 RX ADMIN — SODIUM CHLORIDE: 9 INJECTION, SOLUTION INTRAVENOUS at 03:01

## 2023-01-09 RX ADMIN — ACETAMINOPHEN 650 MG: 325 TABLET, FILM COATED ORAL at 04:01

## 2023-01-09 NOTE — PLAN OF CARE
HO-2 Addendum:  I have seen and evaluated the patient in conjunction and independently with HO-1. I agree with their assessment and plan. Further changes as seen below:    Pt is a 58 y/o female with PMH of DM, HTN, Neuropathy, HLD, JORGE ALBERTO, and anxiety who presents to ED with with left lower leg pain than began yesterday. Pt reports symptoms began yesterday morning with a small bit on left ankle that she scratched. Overnight her leg became painful to touch and to bear weight. Associated with increased swelling of ankle and leg. Denies fever or discharge from wound. Pt  recently passed away. She reports frequently missing daily medications for unknown period of time while caring for him.     Physical Exam  Constitutional:       General: She is not in acute distress.     Appearance: She is obese.   Cardiovascular:      Rate and Rhythm: Normal rate and regular rhythm.      Heart sounds: No murmur heard.  Pulmonary:      Effort: Pulmonary effort is normal.      Breath sounds: Normal breath sounds. No wheezing.   Abdominal:      General: Bowel sounds are normal. There is no distension.      Palpations: Abdomen is soft.      Tenderness: There is no abdominal tenderness.   Musculoskeletal:         General: Swelling and tenderness present.      Left lower leg: Edema present.      Comments: Obvious asymmetry left LE, erythema, warmth and tenderness to ankle and calf. No active discharge.    Skin:     General: Skin is warm and dry.   Psychiatric:         Mood and Affect: Mood normal.         Behavior: Behavior normal.         Thought Content: Thought content normal.         Judgment: Judgment normal.       Cellulitis- U/S LE neg for DVT. XR no bone involvement  SIRS+  NICOLE on CKD stage 3B  DM  HTN  HLD  Medication non-adherence 2/2 social stressor  Chronic constipation    Admit to Observation for IV antibiotic therapy and initiation DM  medications.  Cefepime and clindamycin IV  Blood cx x 2   IVF @ 100 mL/hr with repeat  CMP in AM  Resume home Cymbalta  Resume home metoprolol and lisinopril  Tylenol and Norco PRN for pain  Miralax daily    DVT Ppx: heparin  GI Ppx: home protonix      Zaida Mcdermott M.D.  [facility] HO-2

## 2023-01-09 NOTE — ED PROVIDER NOTES
I, obdulia rodriguez, did personally perform an h&p with this patient initially seen by our advanced practice provider. I did direct the evaluation and management and agree with the care as documented.     Obdulia Rodriguez MD  01/09/23 8330

## 2023-01-09 NOTE — ED PROVIDER NOTES
Encounter Date: 2023       History     Chief Complaint   Patient presents with    Leg Pain     PT W CO LT LOWER LEG PAIN, REDNESS AND SWELLING SINCE LAST PM.  HX OF DM  .      Patient reports to the ER with complaints of left lower leg/shin pain with assoc redness and swelling; pt is unsure if she was bitten by insects over the weekend; patient also admits to not taking her insulin as directed the last several days    The history is provided by the patient.   Leg Pain   The incident occurred at home. The incident occurred yesterday. The pain is present in the left leg. The quality of the pain is described as throbbing. The pain is at a severity of 4/10. The pain has been Constant since onset. Pertinent negatives include no numbness, no inability to bear weight, no muscle weakness and no tingling. She reports no foreign bodies present. The symptoms are aggravated by palpation.   Review of patient's allergies indicates:   Allergen Reactions    Aspirin      Past Medical History:   Diagnosis Date    Anxiety disorder, unspecified     Chronic constipation     CKD (chronic kidney disease)     Depression     DM (diabetes mellitus)     Dyslipidemia     GERD (gastroesophageal reflux disease)     HTN (hypertension)     Morbid obesity     Sleep apnea, unspecified      Past Surgical History:   Procedure Laterality Date    ARTHROSCOPIC REMOVAL OF LOOSE BODY FROM JOINT       SECTION      CHOLECYSTECTOMY      Drainage of oral abscess      Exploration using laparoscope      UPPER GASTROINTESTINAL ENDOSCOPY       Family History   Problem Relation Age of Onset    Cancer Mother     Heart disease Mother     Diabetes Father     Heart attack Father     Kidney failure Father     Stroke Father     Cancer Brother      Social History     Tobacco Use    Smoking status: Never    Smokeless tobacco: Never   Substance Use Topics    Alcohol use: Never    Drug use: Never     Review of Systems   Constitutional:  Negative for fever.    HENT:  Negative for sore throat.    Eyes: Negative.    Respiratory:  Negative for shortness of breath.    Cardiovascular:  Negative for chest pain.   Gastrointestinal:  Negative for nausea.   Genitourinary:  Negative for dysuria.   Musculoskeletal:  Negative for back pain.   Skin:  Negative for rash.   Neurological:  Negative for tingling, weakness and numbness.   Hematological:  Does not bruise/bleed easily.   Psychiatric/Behavioral: Negative.       Physical Exam     Initial Vitals [01/09/23 0932]   BP Pulse Resp Temp SpO2   127/85 103 16 97.9 °F (36.6 °C) 100 %      MAP       --         Physical Exam    Vitals reviewed.  Constitutional: She appears well-developed and well-nourished.   HENT:   Head: Normocephalic and atraumatic.   Eyes: Conjunctivae and EOM are normal. Pupils are equal, round, and reactive to light.   Neck: Neck supple.   Normal range of motion.  Cardiovascular:  Normal rate, regular rhythm and normal heart sounds.           Pulmonary/Chest: Breath sounds normal. No respiratory distress. She has no wheezes. She exhibits no tenderness.   Abdominal: Abdomen is soft. Bowel sounds are normal. There is no abdominal tenderness.   Musculoskeletal:         General: Normal range of motion.      Cervical back: Normal range of motion and neck supple.      Right lower leg: Normal.      Left lower leg: Swelling and tenderness present.      Right ankle: Normal pulse.      Left ankle: Normal pulse.        Legs:       Comments: Area approx 2cm in length, and 1cm in width that is erythematosus and tender consistent with developing cellulitis     Neurological: She is alert and oriented to person, place, and time. She displays normal reflexes. No cranial nerve deficit or sensory deficit.   Skin: Skin is warm and dry.   Psychiatric: She has a normal mood and affect. Her behavior is normal. Judgment and thought content normal.       ED Course   Procedures  Labs Reviewed   BASIC METABOLIC PANEL - Abnormal; Notable for  the following components:       Result Value    Sodium Level 134 (*)     Glucose Level 283 (*)     Blood Urea Nitrogen 28.8 (*)     Creatinine 1.68 (*)     All other components within normal limits   SEDIMENTATION RATE, AUTOMATED - Abnormal; Notable for the following components:    Sed Rate 79 (*)     All other components within normal limits   C-REACTIVE PROTEIN - Abnormal; Notable for the following components:    C-Reactive Protein 213.50 (*)     All other components within normal limits   CBC WITH DIFFERENTIAL - Abnormal; Notable for the following components:    WBC 14.7 (*)     MCHC 32.4 (*)     Neut # 10.59 (*)     IG# 0.06 (*)     All other components within normal limits   POCT GLUCOSE - Abnormal; Notable for the following components:    POCT Glucose 352 (*)     All other components within normal limits   POCT GLUCOSE - Abnormal; Notable for the following components:    POCT Glucose 190 (*)     All other components within normal limits   LACTIC ACID, PLASMA - Normal   CBC W/ AUTO DIFFERENTIAL    Narrative:     The following orders were created for panel order CBC auto differential.  Procedure                               Abnormality         Status                     ---------                               -----------         ------                     CBC with Differential[[id number]]        Abnormal            Final result                 Please view results for these tests on the individual orders.          Imaging Results              X-Ray Tibia Fibula 2 View Left (Final result)  Result time 01/09/23 13:33:02      Final result by Betito Schwarz MD (01/09/23 13:33:02)                   Impression:      No acute osseous process appreciated.      Electronically signed by: Betito Schwarz  Date:    01/09/2023  Time:    13:33               Narrative:    EXAMINATION:  XR TIBIA FIBULA 2 VIEW LEFT    CLINICAL HISTORY:  Pain in unspecified lower leg    TECHNIQUE:  AP and lateral views of the left tibia and  fibula.    COMPARISON:  No relevant comparison studies available.    FINDINGS:  Suspect remote proximal fibular fracture.  No acute fracture identified.  Left knee and ankle are aligned.  Numerous vascular calcifications.  No tracking subcutaneous gas.                                       Medications   sodium chloride 0.9% bolus 3,000 mL 3,000 mL (0 mLs Intravenous Stopped 1/9/23 1712)   piperacillin-tazobactam (ZOSYN) 4.5 g in sodium chloride 0.9 % 100 mL IVPB (MB+) (0 g Intravenous Stopped 1/9/23 1230)                              Clinical Impression:   Final diagnoses:  [M79.669, M79.89] Pain and swelling of lower leg - suspected cellulitis of anterior distal aspect of tib/fib, hx of dm  [L03.90] Cellulitis, unspecified cellulitis site  [R73.9] Hyperglycemia        ED Disposition Condition    Observation                 REGINALDO Lake  04/22/23 3814

## 2023-01-09 NOTE — PLAN OF CARE
01/09/23 1621   Discharge Assessment   Confirmed/corrected address, phone number and insurance Yes   Confirmed Demographics Correct on Facesheet   Source of Information patient   When was your last doctors appointment?   (Andreea Borrego)   Reason For Admission Cellulitis   People in Home child(tara), adult   Facility Arrived From: Home   Do you expect to return to your current living situation? Yes   Do you have help at home or someone to help you manage your care at home? Yes   Who are your caregiver(s) and their phone number(s)? Kp Denis III (28 yr old son) - No phone   Prior to hospitilization cognitive status: Alert/Oriented   Current cognitive status: Alert/Oriented   Equipment Currently Used at Home none   Patient currently being followed by outpatient case management? No   Do you currently have service(s) that help you manage your care at home? No   Do you take prescription medications? Yes   Do you have prescription coverage? Yes   Coverage Medicaid   Do you have any problems affording any of your prescribed medications? No   Is the patient taking medications as prescribed? yes   Who is going to help you get home at discharge? Drove self to clinic appointment   How do you get to doctors appointments? car, drives self   Are you on dialysis? No   Discharge Plan A Home;Home Health   DME Needed Upon Discharge  none   Discharge Plan discussed with: Patient   Discharge Barriers Identified None   Physical Activity   On average, how many days per week do you engage in moderate to strenuous exercise (like a brisk walk)? 0 days   On average, how many minutes do you engage in exercise at this level? 0 min   Financial Resource Strain   How hard is it for you to pay for the very basics like food, housing, medical care, and heating? Not very   Housing Stability   In the last 12 months, was there a time when you were not able to pay the mortgage or rent on time? N   In the last 12 months, how many places have you  lived? 1   In the last 12 months, was there a time when you did not have a steady place to sleep or slept in a shelter (including now)? N   Transportation Needs   In the past 12 months, has lack of transportation kept you from medical appointments or from getting medications? no   In the past 12 months, has lack of transportation kept you from meetings, work, or from getting things needed for daily living? No   Food Insecurity   Within the past 12 months, you worried that your food would run out before you got the money to buy more. Never true   Within the past 12 months, the food you bought just didn't last and you didn't have money to get more. Never true   Stress   Do you feel stress - tense, restless, nervous, or anxious, or unable to sleep at night because your mind is troubled all the time - these days? To some exte   Social Connections   In a typical week, how many times do you talk on the phone with family, friends, or neighbors? More than 3   How often do you get together with friends or relatives? More than 3   How often do you attend [employer] or Faith services? More than 4  (Denominational)   Do you belong to any clubs or organizations such as [employer] groups, unions, fraternal or athletic groups, or school groups? No   How often do you attend meetings of the clubs or organizations you belong to? Never   Are you , , , , never , or living with a partner?   (Spouse  2022)   Alcohol Use   Q1: How often do you have a drink containing alcohol? Never   Q2: How many drinks containing alcohol do you have on a typical day when you are drinking? None   Q3: How often do you have six or more drinks on one occasion? Never   OTHER   Name(s) of People in Home SonKp III (No Phone)     Spouse grieving recent death of spouse who was a pt at [facility], but reported she has declined counseling services offered through [facility]. Family members have reached out, but  spouse indicated she prefers to be alone. Pt/Spouse previously referred for resource assistance through [facility] in December. Offered emotional support and encouragement. Will follow for d/c needs.

## 2023-01-09 NOTE — PROGRESS NOTES
Subjective:       Patient ID: Gloria Denis is a 57 y.o. female.    Chief Complaint: Constipation (Not as often, denies blood) and Gastroesophageal Reflux (Doing well)    This 57-year-old  female with a history of cholecystectomy, anxiety, depression, chronic back pain, diabetes mellitus, hypertension, dyslipidemia, obesity, and JORGE ALBERTO presents unaccompanied for a follow-up visit.  She was initially evaluated via telemedicine visit October 8, 2020, referred for chronic constipation at that time.  She reported a longstanding history of chronic constipation and going up to 1 week without a bowel movement.  She had frequent straining and had to sit on the toilet for long periods of time in order to have a bowel movement.  She had occasional nausea without vomiting and lower abdominal cramping that was typically relieved with defecation.  She had tried lactulose, MiraLAX, and Colace with minimal relief noted.  She was unable to tolerate lactulose and MiraLAX, which caused her nausea.  She took OTC Dulcolax on an as-needed basis with some relief noted.  She tried samples of Linzess in the past with good relief noted.  Her appetite was good and her weight was stable.  She denied nocturnal symptoms, fever, chills, odynophagia, dysphagia, acid reflux, heartburn, early satiety, hematochezia, melena, fecal urgency, fecal incontinence, or pain with defecation.  She was recommended to stop Dulcolax and start Linzess 145 mcg daily, as well as provided lifestyle and dietary modifications.     Laboratory results October 20, 2020 revealed potassium 5.2, glucose 225, BUN 29, GFR 55, globulin 4.70, and otherwise unremarkable CMP; cholesterol 226, triglycerides 255, , VLDL 51, and otherwise unremarkable lipid panel; TSH 1.647; hemoglobin 11.7, and otherwise unremarkable CBC.  Laboratory results December 24, 2020 revealed potassium 5.7, glucose 277, BUN 32.0, creatinine 1.47, GFR 47, globulin 4.2, and  otherwise unremarkable CMP; hemoglobin A1c 9.7%; cholesterol 231, triglycerides 198, , and otherwise unremarkable lipid panel.     She was seen for a follow-up visit January 20, 2021.  She continued to take Linzess 145 mcg daily.  She continued with occasional constipation and did not always feel completely evacuated.  Her appetite was good and her weight was stable.  She denied nocturnal symptoms, fever, chills, nausea, vomiting, odynophagia, dysphagia, acid reflux, heartburn, early satiety, or abdominal pain.  She had 1-2 formed to soft stools daily without melena, hematochezia, fecal urgency, fecal incontinence, or pain with defecation.  Her Linzess dosage was increased to 290 mcg daily.     She was evaluated for a follow-up visit via telemedicine visit July 21, 2021.  She continued to take Linzess 290 mcg daily.  She felt as though the medication helped her symptoms but would occasionally feel incompletely evacuated with defecation on some days.  She had 1 formed stool daily.  She denied straining or sitting on the toilet for long periods of time.  She denied melena, hematochezia, fecal urgency, fecal incontinence, or pain with defecation.  Her appetite was good and her weight was stable.  She denied fever, chills, nausea, vomiting, odynophagia, dysphagia, early satiety, or abdominal pain.     She was evaluated for a follow-up visit January 27, 2022.  She continued to take Linzess 290 mcg daily and reported having one bowel movement daily with good relief noted.  She denied abdominal pain, melena, hematochezia, fecal urgency, fecal incontinence, or pain with defecation.  She reported intermittent acid reflux described as burning up into the back of her throat.  She was taking Tums as needed with some relief noted.  She would awaken in the middle of the night with reflux and pyrosis a few times per week.  She denied fever, chills, nausea, vomiting, odynophagia, dysphagia, or early satiety.     She was last  seen for a follow-up visit 2022 and reported feeling well since her last office visit.  She continued to take Linzess 290 mcg daily and omeprazole 40 mg daily and denied any problems at that time.    Today, she presents for a follow-up visit.  She reports continuing to take omeprazole 40 mg as needed and Linzess 290 mcg daily.  She reports continuing to feel well without any problems from previous office visit.  She reports that her  passed away 2023.     Stool for occult blood was negative 2022.  She denies ever having a colonoscopy done.  She had an EGD several years ago but she is unsure of the findings of the procedure.  She takes OTC Ibuprofen as needed for back pain and denies use of blood thinners.  She denies tobacco or alcohol use.  Her sister has a history of colon polyps.    Review of patient's allergies indicates:   Allergen Reactions    Aspirin      Past Medical History:   Diagnosis Date    Anxiety disorder, unspecified     Chronic constipation     CKD (chronic kidney disease)     Depression     DM (diabetes mellitus)     Dyslipidemia     GERD (gastroesophageal reflux disease)     HTN (hypertension)     Morbid obesity     Sleep apnea, unspecified      Past Surgical History:   Procedure Laterality Date    ARTHROSCOPIC REMOVAL OF LOOSE BODY FROM JOINT       SECTION      CHOLECYSTECTOMY      Drainage of oral abscess      Exploration using laparoscope      UPPER GASTROINTESTINAL ENDOSCOPY       Family History:   family history includes Cancer in her brother and mother; Diabetes in her father; Heart attack in her father; Heart disease in her mother; Kidney failure in her father; Stroke in her father.    Social History:    reports that she has never smoked. She has never used smokeless tobacco. She reports that she does not drink alcohol and does not use drugs.    Review of Systems  Negative except as noted in the HPI.      Objective:      Physical  "Exam  Constitutional:       Appearance: Normal appearance.   HENT:      Head: Normocephalic.      Mouth/Throat:      Mouth: Mucous membranes are moist.   Eyes:      Extraocular Movements: Extraocular movements intact.      Conjunctiva/sclera: Conjunctivae normal.      Pupils: Pupils are equal, round, and reactive to light.   Cardiovascular:      Rate and Rhythm: Normal rate and regular rhythm.      Pulses: Normal pulses.      Heart sounds: Normal heart sounds.   Pulmonary:      Effort: Pulmonary effort is normal.      Breath sounds: Normal breath sounds.   Abdominal:      General: Bowel sounds are normal.      Palpations: Abdomen is soft.   Musculoskeletal:         General: Normal range of motion.      Cervical back: Normal range of motion and neck supple.   Skin:     General: Skin is warm and dry.   Neurological:      General: No focal deficit present.      Mental Status: She is alert and oriented to person, place, and time.   Psychiatric:         Mood and Affect: Mood normal.         Behavior: Behavior normal.         Thought Content: Thought content normal.         Judgment: Judgment normal.       Home Medications:     Current Outpatient Medications   Medication Sig    atorvastatin (LIPITOR) 40 MG tablet Take 1 tablet (40 mg total) by mouth once daily.    BD CAYLA 2ND GEN PEN NEEDLE 32 gauge x 5/32" Ndle SMARTSIG:Injection Every Night    DULoxetine (CYMBALTA) 30 MG capsule Take 1 capsule (30 mg total) by mouth once daily at 6am.    DULoxetine (CYMBALTA) 60 MG capsule Take 1 capsule (60 mg total) by mouth every evening.    enalapril (VASOTEC) 2.5 MG tablet Take 1 tablet (2.5 mg total) by mouth once daily.    insulin detemir U-100 (LEVEMIR FLEXTOUCH U-100 INSULN) 100 unit/mL (3 mL) InPn pen Inject 30 Units into the skin every evening.    JARDIANCE 10 mg tablet Take 1 tablet (10 mg total) by mouth every morning.    LINZESS 290 mcg Cap capsule Take 1 capsule (290 mcg total) by mouth once daily.    LOKELMA 10 gram " packet SMARTSI Packet(s) By Mouth 3 Times a Week    metoprolol tartrate (LOPRESSOR) 25 MG tablet Take 1 tablet (25 mg total) by mouth 2 (two) times daily.    omeprazole (PRILOSEC) 40 MG capsule Take 40 mg by mouth as needed.    SITagliptan-metformin (JANUMET) 50-1,000 mg per tablet Take 1 tablet by mouth 2 (two) times a day.    baclofen (LIORESAL) 10 MG tablet Take 1 tablet (10 mg total) by mouth 2 (two) times daily. for 7 days    betamethasone dipropionate 0.05 % cream   See Instructions, APPLY  CREAM TOPICALLY TWICE DAILY, # 60 gm, 0 Refill(s), Pharmacy: [id number], 155, cm, Height/Length Dosing, 21 8:39:00 CDT, 112, kg, Weight Dosing, 21 11:11:00 CDT    conjugated estrogens (PREMARIN) vaginal cream   See Instructions, Apply dime size amout to vaginal wall with a gloved finger x1 week then use 2x/week going forward. Do not use with intecourse., # 30 gm, 2 Refill(s), Pharmacy: [facility], 155, cm, Height/Length Dosing, 21...     Laboratory Results:     Recent Results (from the past 6720 hour(s))   Comprehensive Metabolic Panel    Collection Time: 22  8:06 AM   Result Value Ref Range    Sodium Level 143 136 - 145 mmol/L    Potassium Level 4.3 3.5 - 5.1 mmol/L    Chloride 111 (H) 98 - 107 mmol/L    Carbon Dioxide 21 (L) 22 - 29 mmol/L    Glucose Level 149 (H) 74 - 100 mg/dL    Blood Urea Nitrogen 14.9 9.8 - 20.1 mg/dL    Creatinine 1.49 (H) 0.55 - 1.02 mg/dL    Calcium Level Total 9.5 8.4 - 10.2 mg/dL    Protein Total 7.6 6.4 - 8.3 gm/dL    Albumin Level 3.7 3.5 - 5.0 gm/dL    Globulin 3.9 (H) 2.4 - 3.5 gm/dL    Albumin/Globulin Ratio 0.9 (L) 1.1 - 2.0 ratio    Bilirubin Total 0.4 <=1.5 mg/dL    Alkaline Phosphatase 68 40 - 150 unit/L    Alanine Aminotransferase 15 0 - 55 unit/L    Aspartate Aminotransferase 24 5 - 34 unit/L    Estimated GFR- 47 mls/min/1.73/m2   Hemoglobin A1C    Collection Time: 22  8:06 AM   Result Value Ref Range     Hemoglobin A1c 6.8 <=7.0 %    Estimated Average Glucose 148.5 mg/dL   Lipid Panel    Collection Time: 06/22/22  8:06 AM   Result Value Ref Range    Cholesterol Total 155 <=200 mg/dL    HDL Cholesterol 34 (L) 35 - 60 mg/dL    Triglyceride 163 (H) 37 - 140 mg/dL    Cholesterol/HDL Ratio 5 0 - 5    Very Low Density Lipoprotein 33     LDL Cholesterol 88.00 50.00 - 140.00 mg/dL   TSH    Collection Time: 06/22/22  8:06 AM   Result Value Ref Range    Thyroid Stimulating Hormone 1.3150 0.3500 - 4.9400 uIU/mL   CBC with Differential    Collection Time: 06/22/22  8:06 AM   Result Value Ref Range    WBC 6.9 4.5 - 11.5 x10(3)/mcL    RBC 3.95 (L) 4.20 - 5.40 x10(6)/mcL    Hgb 11.4 (L) 12.0 - 16.0 gm/dL    Hct 35.7 (L) 37.0 - 47.0 %    MCV 90.4 80.0 - 94.0 fL    MCH 28.9 27.0 - 31.0 pg    MCHC 31.9 (L) 33.0 - 36.0 mg/dL    RDW 13.4 11.5 - 17.0 %    Platelet 335 130 - 400 x10(3)/mcL    MPV 9.9 9.4 - 12.4 fL    Neut % 46.1 %    Lymph % 35.0 %    Mono % 8.8 %    Eos % 7.8 %    Basophil % 2.0 %    Lymph # 2.43 0.6 - 4.6 x10(3)/mcL    Neut # 3.2 2.1 - 9.2 x10(3)/mcL    Mono # 0.61 0.1 - 1.3 x10(3)/mcL    Eos # 0.54 0 - 0.9 x10(3)/mcL    Baso # 0.14 0 - 0.2 x10(3)/mcL    IG# 0.02 (H) 0 - 0.0155 x10(3)/mcL    IG% 0.3 0 - 0.43 %    NRBC% 0.0 %   Echo    Collection Time: 06/30/22 10:44 AM   Result Value Ref Range    BSA 2.13 m2    TDI SEPTAL 0.09 m/s    LV LATERAL E/E' RATIO 7.56 m/s    LV SEPTAL E/E' RATIO 7.56 m/s    Right Atrial Pressure (from IVC) 3 mmHg    EF 60 %    Left Ventricular Outflow Tract Mean Velocity 0.494818651200469 cm/s    Left Ventricular Outflow Tract Mean Gradient 1.53 mmHg    TDI LATERAL 0.09 m/s    LVIDd 3.34 (A) 3.5 - 6.0 cm    IVS 1.02 0.6 - 1.1 cm    Posterior Wall 0.87 0.6 - 1.1 cm    LVIDs 2.34 2.1 - 4.0 cm    FS 30 28 - 44 %    LV mass 88.62 g    LA size 2.80 cm    RVDD 2.57 cm    Left Ventricle Relative Wall Thickness 0.52 cm    AV mean gradient 2 mmHg    AV valve area 2.29 cm2    AV Velocity Ratio 0.81      AV index (prosthetic) 0.76     MV mean gradient 1 mmHg    MV valve area p 1/2 method 4.10 cm2    MV valve area by continuity eq 2.41 cm2    E/A ratio 0.88     Mean e' 0.09 m/s    E wave deceleration time [id number] msec    LVOT diameter 1.96 cm    LVOT area 3.0 cm2    LVOT peak gavino 0.77 m/s    LVOT peak VTI 15.90 cm    Ao peak gavino 0.95 m/s    Ao VTI 20.9 cm    LVOT stroke volume 47.95 cm3    AV peak gradient 4 mmHg    MV peak gradient 3 mmHg    TV rest pulmonary artery pressure 23 mmHg    E/E' ratio 7.56 m/s    MV Peak E Gavino 0.68 m/s    TR Max Gavino 2.23 m/s    MV VTI 19.9 cm    MV stenosis pressure 1/2 time 53.052833118487044 ms    MV Peak A Gavino 0.77 m/s    LV Systolic Volume 18.99 mL    LV Systolic Volume Index 9.4 mL/m2    LV Diastolic Volume 45.33 mL    LV Diastolic Volume Index 22.44 mL/m2    LV Mass Index 44 g/m2    Triscuspid Valve Regurgitation Peak Gradient 20 mmHg   CV Ultrasound doppler arterial legs bilat    Collection Time: 07/15/22  9:50 AM   Result Value Ref Range    Left profunda sys  cm/s    Left super femoral prox sys PSV 66 cm/s    Left super femoral mid sys PSV 59 cm/s    Left super femoral dist sys PSV 44 cm/s    Left ant tibial sys PSV 46 cm/s    Left post tibial sys PSV 28 cm/s    Left popliteal PSV 39 cm/s    Right popliteal PSV 75 cm/s    Left CFA  cm/s    Right CFA PSV 88 cm/s    Right profunda sys PSV 84 cm/s    Right super femoral prox sys PSV 65 cm/s    Right super femoral mid sys PSV 70 cm/s    Right super femoral dist sys PSV 47 cm/s    Right ant tibial sys PSV 41 cm/s    Right post tibial sys PSV 38 cm/s    Right Dorsalis Pedis PSV 32 cm/s    LEFT DORSALIS PEDIS PSV 38 cm/s    Left AMBER 2.07     Right AMBER 1.15    [facility] FOBT (Fecal Occult Blood)    Collection Time: 10/14/22  2:11 PM   Result Value Ref Range    [facility] Generic Orderable SEE COMMENTS      Assessment/Plan:     Problem List Items Addressed This Visit          GI    Gastroesophageal  reflux disease - Primary     GERD lifestyle modifications  Reflux precautions  Avoid NSAID use  Continue omeprazole 40 mg daily  Will consider EGD with persistent or worsening symptoms  Call with updates  Follow-up clinic visit with NP in 6 months          Relevant Medications    omeprazole (PRILOSEC) 40 MG capsule    Chronic constipation     Recommend soluble fiber supplementation  Avoid straining or sitting on the toilet for long periods of time  Continue Linzess 290 mcg daily  Okay to add MiraLAX 17 g daily as needed  Discussed starting Motegrity if Linzess becomes ineffective in controlling symptoms  Call with updates  Follow-up clinic visit with NP in 6 months         Screening for colon cancer     Stool for occult blood was negative October 14, 2022.

## 2023-01-09 NOTE — H&P
[facility] History & Physical Note     Resident Team: [facility] Medicine List     Date of Admit: 1/9/2023    Chief Complaint     Leg Pain (PT W CO LT LOWER LEG PAIN, REDNESS AND SWELLING SINCE LAST PM.  HX OF DM  . )   for     Subjective:      History of Present Illness:  Gloria Denis is a 57 y.o. female who with a history of HTN, T2DM, HLD, JORGE ALBERTO, peripheral neuorpathy, who presented to [facility] ED on 1/9/2023  with a primary complaint of Left leg pain and swelling. Patient states onset was yesterday AM located on the distal anterior left leg above the ankle. She states she noticed a bug bite a few days prior to symptom onset that was pruritic and scratched the area multiple times throughout the day. Last night, the patient reports of increase in pain and swelling in the left lower leg that has progressively worsen this AM. She states the pain is currently a 9/10 on the anterior left lower leg that radiates proximally and posteriorly. She describes the pain as throbbing and is worse with ambulation and dorsiflexion. She states she is able to bear weight. She admits she has not been compliant with her home meds due to her  recently passing on 01/05/2023 for unknown time while caring for him.    In ED VS: /85, , RR 16, SpO2 100%, T 97.9 F.  WBC 14.7, H/H 12.2/37.7. Glucose 283, BUN/Cr was 28.8/1.68. ESR 79, .5, lactic acid 1.5. US of left leg revealed no DVTs. Left leg Xray revealed no acute osseous process. Patient was given 3 L of NS bolus and Zosyn. Blood cultures collected prior to abx administration.     Admit to service under observation for further management of cellulitis. Repeat studies: CBC, CMP, A1c, Lipid panel, TSH, with AM labs.      Past Medical History:  Past Medical History:   Diagnosis Date    Anxiety disorder, unspecified     Chronic constipation     CKD (chronic kidney disease)     Depression     DM (diabetes mellitus)     Dyslipidemia     GERD (gastroesophageal reflux  "disease)     HTN (hypertension)     Morbid obesity     Sleep apnea, unspecified        Past Surgical History:  Past Surgical History:   Procedure Laterality Date    ARTHROSCOPIC REMOVAL OF LOOSE BODY FROM JOINT       SECTION      CHOLECYSTECTOMY      Drainage of oral abscess      Exploration using laparoscope      UPPER GASTROINTESTINAL ENDOSCOPY         Family History:  Family History   Problem Relation Age of Onset    Cancer Mother     Heart disease Mother     Diabetes Father     Heart attack Father     Kidney failure Father     Stroke Father     Cancer Brother        Social History:  Social History     Tobacco Use    Smoking status: Never    Smokeless tobacco: Never   Substance Use Topics    Alcohol use: Never    Drug use: Never       Allergies:  Review of patient's allergies indicates:   Allergen Reactions    Aspirin        Home Medications:  Prior to Admission medications    Medication Sig Start Date End Date Taking? Authorizing Provider   atorvastatin (LIPITOR) 40 MG tablet Take 1 tablet (40 mg total) by mouth once daily. 22  BLAKE Swain   baclofen (LIORESAL) 10 MG tablet Take 1 tablet (10 mg total) by mouth 2 (two) times daily. for 7 days 10/23/22 10/30/22  Adeel Thayer Jr., FNP   BD CAYLA 2ND GEN PEN NEEDLE 32 gauge x 5/32" Ndle SMARTSIG:Injection Every Night 3/10/22   Historical Provider   betamethasone dipropionate 0.05 % cream   See Instructions, APPLY  CREAM TOPICALLY TWICE DAILY, # 60 gm, 0 Refill(s), Pharmacy: [id number], 155, cm, Height/Length Dosing, 21 8:39:00 CDT, 112, kg, Weight Dosing, 21 11:11:00 CDT 21   Historical Provider   conjugated estrogens (PREMARIN) vaginal cream   See Instructions, Apply dime size amout to vaginal wall with a gloved finger x1 week then use 2x/week going forward. Do not use with intecourse., # 30 gm, 2 Refill(s), Pharmacy: [facility], 155, cm, Height/Length Dosing, 21... 21   " Historical Provider   DULoxetine (CYMBALTA) 30 MG capsule Take 1 capsule (30 mg total) by mouth once daily at 6am. 22   ALYX Foreman   DULoxetine (CYMBALTA) 60 MG capsule Take 1 capsule (60 mg total) by mouth every evening. 22  ALYX Foreman   enalapril (VASOTEC) 2.5 MG tablet Take 1 tablet (2.5 mg total) by mouth once daily. 22   ALYX Foreman   insulin detemir U-100 (LEVEMIR FLEXTOUCH U-100 INSULN) 100 unit/mL (3 mL) InPn pen Inject 30 Units into the skin every evening. 22  ALYX Foreman   JARDIANCE 10 mg tablet Take 1 tablet (10 mg total) by mouth every morning. 22  ALYX Foreman   LINZESS 290 mcg Cap capsule Take 1 capsule (290 mcg total) by mouth once daily. 22  ALYX Mercado   LOKELMA 10 gram packet SMARTSI Packet(s) By Mouth 3 Times a Week 22   ALYX Mercado   metoprolol tartrate (LOPRESSOR) 25 MG tablet Take 1 tablet (25 mg total) by mouth 2 (two) times daily. 22  BLAKE Swain   omeprazole (PRILOSEC) 40 MG capsule Take 40 mg by mouth as needed. 3/23/22   Historical Provider   omeprazole (PRILOSEC) 40 MG capsule Take 1 capsule (40 mg total) by mouth once daily. 23  ALYX Love   SITagliptan-metformin (JANUMET) 50-1,000 mg per tablet Take 1 tablet by mouth 2 (two) times a day. 22  ALYX Foreman         Review of Systems:  Review of Systems   Constitutional:  Positive for chills and malaise/fatigue. Negative for diaphoresis and fever.   HENT:  Negative for congestion and sore throat.    Eyes:  Negative for blurred vision.   Respiratory:  Positive for shortness of breath. Negative for wheezing.    Cardiovascular:  Positive for leg swelling. Negative for chest pain, palpitations and claudication.   Gastrointestinal:  Negative for abdominal pain, diarrhea, nausea and vomiting.   Genitourinary:  Negative for dysuria.   Musculoskeletal:  Positive  "for myalgias.   Skin:  Positive for itching. Negative for rash.   Neurological:  Positive for weakness. Negative for dizziness and headaches.          Objective:   Last 24 Hour Vital Signs:  BP  Min: 107/67  Max: 127/85  Temp  Av.9 °F (36.6 °C)  Min: 97.8 °F (36.6 °C)  Max: 97.9 °F (36.6 °C)  Pulse  Av  Min: 94  Max: 103  Resp  Av  Min: 16  Max: 18  SpO2  Av.3 %  Min: 99 %  Max: 100 %  Height  Av' 0.5" (153.7 cm)  Min: 5' (152.4 cm)  Max: 5' 1" (154.9 cm)  Weight  Av kg (224 lb 15 oz)  Min: 102 kg (224 lb 13.9 oz)  Max: 102.1 kg (225 lb)  Body mass index is 42.49 kg/m².  No intake/output data recorded.    Physical Examination:  Physical Exam  Vitals and nursing note reviewed.   Constitutional:       Appearance: Normal appearance.   HENT:      Head: Normocephalic and atraumatic.      Mouth/Throat:      Mouth: Mucous membranes are moist.      Pharynx: Oropharynx is clear.   Eyes:      Extraocular Movements: Extraocular movements intact.      Conjunctiva/sclera: Conjunctivae normal.      Pupils: Pupils are equal, round, and reactive to light.   Cardiovascular:      Rate and Rhythm: Normal rate and regular rhythm.      Pulses: Normal pulses.      Heart sounds: Normal heart sounds.   Pulmonary:      Effort: Pulmonary effort is normal. No respiratory distress.      Breath sounds: Normal breath sounds. No wheezing.   Abdominal:      General: Abdomen is flat. Bowel sounds are normal.      Palpations: Abdomen is soft.      Tenderness: There is no abdominal tenderness.   Musculoskeletal:         General: Swelling (left lower leg from ankle to mid shaft) and tenderness present. No signs of injury. Normal range of motion.      Cervical back: Normal range of motion and neck supple.      Left lower leg: Edema present.   Skin:     General: Skin is warm.      Capillary Refill: Capillary refill takes less than 2 seconds.      Findings: Erythema (located on the anterior left leg) and lesion (2 cm " circular lesion on distal anterior left lower leg) present.   Neurological:      General: No focal deficit present.      Mental Status: She is alert and oriented to person, place, and time.   Psychiatric:         Mood and Affect: Mood normal.         Behavior: Behavior normal.              Laboratory:  Most Recent Data:  CBC:   Lab Results   Component Value Date    WBC 14.7 (H) 01/09/2023    HGB 12.2 01/09/2023    HCT 37.7 01/09/2023     01/09/2023    MCV 88.3 01/09/2023    RDW 13.0 01/09/2023     WBC Differential:   Recent Labs   Lab 01/09/23  1041   WBC 14.7*   HGB 12.2   HCT 37.7      MCV 88.3     BMP:   Lab Results   Component Value Date     (L) 01/09/2023    K 4.6 01/09/2023    CO2 22 01/09/2023    BUN 28.8 (H) 01/09/2023    CREATININE 1.68 (H) 01/09/2023    CALCIUM 10.2 01/09/2023     DM:   Lab Results   Component Value Date    HGBA1C 6.8 06/22/2022    HGBA1C 7.2 02/21/2022    HGBA1C 6.5 10/11/2021    CREATININE 1.68 (H) 01/09/2023     Trended Lab Data:  Recent Labs   Lab 01/09/23  1041   WBC 14.7*   HGB 12.2   HCT 37.7      MCV 88.3   RDW 13.0   *   K 4.6   CO2 22   BUN 28.8*   CREATININE 1.68*     Microbiology Data:  Microbiology Results (last 7 days)       Procedure Component Value Units Date/Time    Blood Culture #2 **CANNOT BE ORDERED STAT** [[id number]] Collected: 01/09/23 1241    Order Status: Sent Specimen: Blood from Hand, Left Updated: 01/09/23 1305    Blood Culture #1 **CANNOT BE ORDERED STAT** [[id number]] Collected: 01/09/23 1241    Order Status: Resulted Specimen: Blood from Antecubital, Right Updated: 01/09/23 1304           Radiology:  Imaging Results              X-Ray Tibia Fibula 2 View Left (Final result)  Result time 01/09/23 13:33:02      Final result by Betito Schwarz MD (01/09/23 13:33:02)                   Impression:      No acute osseous process appreciated.      Electronically signed by: Betito Schwarz  Date:    01/09/2023  Time:    13:33                Narrative:    EXAMINATION:  XR TIBIA FIBULA 2 VIEW LEFT    CLINICAL HISTORY:  Pain in unspecified lower leg    TECHNIQUE:  AP and lateral views of the left tibia and fibula.    COMPARISON:  No relevant comparison studies available.    FINDINGS:  Suspect remote proximal fibular fracture.  No acute fracture identified.  Left knee and ankle are aligned.  Numerous vascular calcifications.  No tracking subcutaneous gas.                                         Assessment & Plan:     Cellulitis Left lower leg; SIRS 2+ (Tachycardia, WBC 14.7)  Blood cultures 2x collected; pending results  Received Zosyn in ED; started cefepime and clindamycin IV   Received 3 L NS bolus in ED  started IVF at 125 mL/hr NS w/repeat CMP in AM  Tylenol 650 mg q4hrs prn for mild pain; Norco 5 mg q6hrs prn for moderate-severe pain    HTN  Resumed home regiment lisinopril 2.5 mg qd and metoprolol tartrate 25 mg BID.   Monitor vitals    T2DM  Started on low-dose Sliding scale; aspart 5 units before meals and nightly prn; will monitor glucose levels and adjust as necessary    HLD  Resumed Lipitor 40 mg qd    Chronic constipation  Started Miralax 17 g qd    Depression 2/2 social stressor  Resumed home Cymbalta       CODE STATUS: FULL  Access: pIV  Antibiotics: Clindamycin and Cefepime  Diet: Diabetic diet  DVT Prophylaxis: Heparin  GI Prophylaxis: home protonix  Fluids: 125 mL/hr NS      Disposition: patient admitted for IV abx treatment for cellulitis      Alfred Linares MD     [facility] Family Medicine Resident HO-1  01/09/2023

## 2023-01-10 VITALS
TEMPERATURE: 98 F | HEIGHT: 60 IN | DIASTOLIC BLOOD PRESSURE: 58 MMHG | HEART RATE: 74 BPM | RESPIRATION RATE: 20 BRPM | OXYGEN SATURATION: 98 % | WEIGHT: 245.81 LBS | SYSTOLIC BLOOD PRESSURE: 95 MMHG | BODY MASS INDEX: 48.26 KG/M2

## 2023-01-10 LAB
ALBUMIN SERPL-MCNC: 2.4 G/DL (ref 3.5–5)
ALBUMIN/GLOB SERPL: 0.6 RATIO (ref 1.1–2)
ALP SERPL-CCNC: 86 UNIT/L (ref 40–150)
ALT SERPL-CCNC: 12 UNIT/L (ref 0–55)
AST SERPL-CCNC: 16 UNIT/L (ref 5–34)
BASOPHILS # BLD AUTO: 0.08 X10(3)/MCL (ref 0–0.2)
BASOPHILS NFR BLD AUTO: 0.6 %
BILIRUBIN DIRECT+TOT PNL SERPL-MCNC: 0.4 MG/DL
BUN SERPL-MCNC: 23.4 MG/DL (ref 9.8–20.1)
CALCIUM SERPL-MCNC: 8.2 MG/DL (ref 8.4–10.2)
CHLORIDE SERPL-SCNC: 113 MMOL/L (ref 98–107)
CHOLEST SERPL-MCNC: 148 MG/DL
CHOLEST/HDLC SERPL: 4 {RATIO} (ref 0–5)
CO2 SERPL-SCNC: 17 MMOL/L (ref 22–29)
CREAT SERPL-MCNC: 1.22 MG/DL (ref 0.55–1.02)
EOSINOPHIL # BLD AUTO: 0.41 X10(3)/MCL (ref 0–0.9)
EOSINOPHIL NFR BLD AUTO: 3 %
ERYTHROCYTE [DISTWIDTH] IN BLOOD BY AUTOMATED COUNT: 13.1 % (ref 11.5–17)
EST. AVERAGE GLUCOSE BLD GHB EST-MCNC: 211.6 MG/DL
GFR SERPLBLD CREATININE-BSD FMLA CKD-EPI: 52 MLS/MIN/1.73/M2
GLOBULIN SER-MCNC: 3.8 GM/DL (ref 2.4–3.5)
GLUCOSE SERPL-MCNC: 193 MG/DL (ref 74–100)
HBA1C MFR BLD: 9 %
HCT VFR BLD AUTO: 29.3 % (ref 37–47)
HDLC SERPL-MCNC: 35 MG/DL (ref 35–60)
HGB BLD-MCNC: 9.4 GM/DL (ref 12–16)
IMM GRANULOCYTES # BLD AUTO: 0.06 X10(3)/MCL (ref 0–0.04)
IMM GRANULOCYTES NFR BLD AUTO: 0.4 %
LDLC SERPL CALC-MCNC: 92 MG/DL (ref 50–140)
LYMPHOCYTES # BLD AUTO: 1.69 X10(3)/MCL (ref 0.6–4.6)
LYMPHOCYTES NFR BLD AUTO: 12.5 %
MCH RBC QN AUTO: 28.4 PG
MCHC RBC AUTO-ENTMCNC: 32.1 MG/DL (ref 33–36)
MCV RBC AUTO: 88.5 FL (ref 80–94)
MONOCYTES # BLD AUTO: 1.01 X10(3)/MCL (ref 0.1–1.3)
MONOCYTES NFR BLD AUTO: 7.5 %
NEUTROPHILS # BLD AUTO: 10.23 X10(3)/MCL (ref 2.1–9.2)
NEUTROPHILS NFR BLD AUTO: 76 %
NRBC BLD AUTO-RTO: 0 %
PLATELET # BLD AUTO: 252 X10(3)/MCL (ref 130–400)
PMV BLD AUTO: 9.7 FL (ref 7.4–10.4)
POCT GLUCOSE: 154 MG/DL (ref 70–110)
POCT GLUCOSE: 204 MG/DL (ref 70–110)
POCT GLUCOSE: 275 MG/DL (ref 70–110)
POTASSIUM SERPL-SCNC: 4.3 MMOL/L (ref 3.5–5.1)
PROT SERPL-MCNC: 6.2 GM/DL (ref 6.4–8.3)
RBC # BLD AUTO: 3.31 X10(6)/MCL (ref 4.2–5.4)
SODIUM SERPL-SCNC: 137 MMOL/L (ref 136–145)
TRIGL SERPL-MCNC: 104 MG/DL (ref 37–140)
TSH SERPL-ACNC: 1.07 UIU/ML (ref 0.35–4.94)
VLDLC SERPL CALC-MCNC: 21 MG/DL
WBC # SPEC AUTO: 13.5 X10(3)/MCL (ref 4.5–11.5)

## 2023-01-10 PROCEDURE — 80061 LIPID PANEL: CPT | Performed by: STUDENT IN AN ORGANIZED HEALTH CARE EDUCATION/TRAINING PROGRAM

## 2023-01-10 PROCEDURE — 97166 OT EVAL MOD COMPLEX 45 MIN: CPT

## 2023-01-10 PROCEDURE — 83036 HEMOGLOBIN GLYCOSYLATED A1C: CPT | Performed by: STUDENT IN AN ORGANIZED HEALTH CARE EDUCATION/TRAINING PROGRAM

## 2023-01-10 PROCEDURE — 25000003 PHARM REV CODE 250: Performed by: STUDENT IN AN ORGANIZED HEALTH CARE EDUCATION/TRAINING PROGRAM

## 2023-01-10 PROCEDURE — 85025 COMPLETE CBC W/AUTO DIFF WBC: CPT | Performed by: STUDENT IN AN ORGANIZED HEALTH CARE EDUCATION/TRAINING PROGRAM

## 2023-01-10 PROCEDURE — 84443 ASSAY THYROID STIM HORMONE: CPT | Performed by: STUDENT IN AN ORGANIZED HEALTH CARE EDUCATION/TRAINING PROGRAM

## 2023-01-10 PROCEDURE — G0378 HOSPITAL OBSERVATION PER HR: HCPCS

## 2023-01-10 PROCEDURE — 96366 THER/PROPH/DIAG IV INF ADDON: CPT

## 2023-01-10 PROCEDURE — 97162 PT EVAL MOD COMPLEX 30 MIN: CPT

## 2023-01-10 PROCEDURE — 36415 COLL VENOUS BLD VENIPUNCTURE: CPT | Performed by: STUDENT IN AN ORGANIZED HEALTH CARE EDUCATION/TRAINING PROGRAM

## 2023-01-10 PROCEDURE — 96361 HYDRATE IV INFUSION ADD-ON: CPT

## 2023-01-10 PROCEDURE — 63600175 PHARM REV CODE 636 W HCPCS: Performed by: STUDENT IN AN ORGANIZED HEALTH CARE EDUCATION/TRAINING PROGRAM

## 2023-01-10 PROCEDURE — 96372 THER/PROPH/DIAG INJ SC/IM: CPT | Performed by: STUDENT IN AN ORGANIZED HEALTH CARE EDUCATION/TRAINING PROGRAM

## 2023-01-10 PROCEDURE — 94761 N-INVAS EAR/PLS OXIMETRY MLT: CPT

## 2023-01-10 PROCEDURE — 80053 COMPREHEN METABOLIC PANEL: CPT | Performed by: STUDENT IN AN ORGANIZED HEALTH CARE EDUCATION/TRAINING PROGRAM

## 2023-01-10 PROCEDURE — 97535 SELF CARE MNGMENT TRAINING: CPT

## 2023-01-10 RX ORDER — CLINDAMYCIN HYDROCHLORIDE 300 MG/1
300 CAPSULE ORAL EVERY 8 HOURS
Qty: 15 CAPSULE | Refills: 0 | Status: SHIPPED | OUTPATIENT
Start: 2023-01-10 | End: 2023-01-14 | Stop reason: SDUPTHER

## 2023-01-10 RX ORDER — HYDROCODONE BITARTRATE AND ACETAMINOPHEN 5; 325 MG/1; MG/1
1 TABLET ORAL EVERY 6 HOURS PRN
Qty: 12 TABLET | Refills: 0 | Status: SHIPPED | OUTPATIENT
Start: 2023-01-10 | End: 2023-02-01 | Stop reason: ALTCHOICE

## 2023-01-10 RX ORDER — PEN NEEDLE, DIABETIC 32GX 5/32"
NEEDLE, DISPOSABLE MISCELLANEOUS
Qty: 100 EACH | Refills: 2 | Status: SHIPPED | OUTPATIENT
Start: 2023-01-10 | End: 2023-10-10 | Stop reason: SDUPTHER

## 2023-01-10 RX ADMIN — ATORVASTATIN CALCIUM 40 MG: 40 TABLET, FILM COATED ORAL at 08:01

## 2023-01-10 RX ADMIN — POLYETHYLENE GLYCOL 3350 17 G: 17 POWDER, FOR SOLUTION ORAL at 08:01

## 2023-01-10 RX ADMIN — SODIUM CHLORIDE: 9 INJECTION, SOLUTION INTRAVENOUS at 02:01

## 2023-01-10 RX ADMIN — HYDROCODONE BITARTRATE AND ACETAMINOPHEN 1 TABLET: 5; 325 TABLET ORAL at 02:01

## 2023-01-10 RX ADMIN — CLINDAMYCIN PHOSPHATE 600 MG: 600 INJECTION, SOLUTION INTRAVENOUS at 02:01

## 2023-01-10 RX ADMIN — PANTOPRAZOLE SODIUM 40 MG: 40 TABLET, DELAYED RELEASE ORAL at 08:01

## 2023-01-10 RX ADMIN — INSULIN ASPART 2 UNITS: 100 INJECTION, SOLUTION INTRAVENOUS; SUBCUTANEOUS at 11:01

## 2023-01-10 RX ADMIN — HYDROCODONE BITARTRATE AND ACETAMINOPHEN 1 TABLET: 5; 325 TABLET ORAL at 08:01

## 2023-01-10 RX ADMIN — LISINOPRIL 2.5 MG: 2.5 TABLET ORAL at 10:01

## 2023-01-10 RX ADMIN — CEFEPIME 2 G: 2 INJECTION, POWDER, FOR SOLUTION INTRAVENOUS at 02:01

## 2023-01-10 RX ADMIN — SODIUM CHLORIDE: 9 INJECTION, SOLUTION INTRAVENOUS at 10:01

## 2023-01-10 RX ADMIN — METOPROLOL TARTRATE 25 MG: 25 TABLET, FILM COATED ORAL at 10:01

## 2023-01-10 RX ADMIN — ACETAMINOPHEN 650 MG: 325 TABLET, FILM COATED ORAL at 03:01

## 2023-01-10 RX ADMIN — CLINDAMYCIN PHOSPHATE 600 MG: 600 INJECTION, SOLUTION INTRAVENOUS at 06:01

## 2023-01-10 NOTE — PLAN OF CARE
Problem: Adult Inpatient Plan of Care  Goal: Plan of Care Review  1/10/2023 1309 by Laotsha Nieto RN  Outcome: Met  1/10/2023 0950 by Latosha Nieto RN  Outcome: Ongoing, Progressing  Goal: Patient-Specific Goal (Individualized)  1/10/2023 1309 by Latosha Nieto RN  Outcome: Met  1/10/2023 0950 by Latosha Nieto RN  Outcome: Ongoing, Progressing  Goal: Absence of Hospital-Acquired Illness or Injury  1/10/2023 1309 by Latosha Nieto RN  Outcome: Met  1/10/2023 0950 by Latosha Nieto RN  Outcome: Ongoing, Progressing  Goal: Optimal Comfort and Wellbeing  1/10/2023 1309 by Latosha Nieto RN  Outcome: Met  1/10/2023 0950 by Latosha Nieto RN  Outcome: Ongoing, Progressing  Goal: Readiness for Transition of Care  1/10/2023 1309 by Latosha Nieto RN  Outcome: Met  1/10/2023 0950 by Latosha Nieto RN  Outcome: Ongoing, Progressing     Problem: Bariatric Environmental Safety  Goal: Safety Maintained with Care  1/10/2023 1309 by Latosha Nieto RN  Outcome: Met  1/10/2023 0950 by Latosha Nieto RN  Outcome: Ongoing, Progressing     Problem: Diabetes Comorbidity  Goal: Blood Glucose Level Within Targeted Range  1/10/2023 1309 by Latosha Nieto RN  Outcome: Met  1/10/2023 0950 by Latosha Nieto RN  Outcome: Ongoing, Progressing     Problem: Pain Acute  Goal: Acceptable Pain Control and Functional Ability  1/10/2023 1309 by Latosha Nieto RN  Outcome: Met  1/10/2023 0950 by Latosha Nieto RN  Outcome: Ongoing, Progressing     Problem: Fall Injury Risk  Goal: Absence of Fall and Fall-Related Injury  1/10/2023 1309 by Latosha Nieto RN  Outcome: Met  1/10/2023 0950 by Latosha Nieto RN  Outcome: Ongoing, Progressing

## 2023-01-10 NOTE — PLAN OF CARE
Problem: Adult Inpatient Plan of Care  Goal: Plan of Care Review  1/10/2023 0509 by Nancy Barksdale RN  Outcome: Ongoing, Progressing  1/10/2023 0506 by Nancy Barksdale RN  Flowsheets (Taken 1/10/2023 0506)  Plan of Care Reviewed With: patient  Goal: Patient-Specific Goal (Individualized)  Outcome: Ongoing, Progressing  Goal: Absence of Hospital-Acquired Illness or Injury  Outcome: Ongoing, Progressing  Goal: Optimal Comfort and Wellbeing  Outcome: Ongoing, Progressing  Goal: Readiness for Transition of Care  Outcome: Ongoing, Progressing     Problem: Bariatric Environmental Safety  Goal: Safety Maintained with Care  Outcome: Ongoing, Progressing     Problem: Diabetes Comorbidity  Goal: Blood Glucose Level Within Targeted Range  Outcome: Ongoing, Progressing     Problem: Pain Acute  Goal: Acceptable Pain Control and Functional Ability  Outcome: Ongoing, Progressing     Problem: Fall Injury Risk  Goal: Absence of Fall and Fall-Related Injury  Outcome: Ongoing, Progressing

## 2023-01-10 NOTE — PLAN OF CARE
New consult for DME RW. Patient is in agreement with no preference as to DME company. Referral and prescription sent to [facility] via [employer].

## 2023-01-10 NOTE — PT/OT/SLP EVAL
Occupational Therapy   Evaluation and Discharge Note    Name: Gloria Denis  MRN: [id number]  Admitting Diagnosis: Cellulitis  Recent Surgery: * No surgery found *      Recommendations:     Discharge Recommendations: home  Discharge Equipment Recommendations: walker, rolling  Barriers to discharge:  None    Assessment:     Gloria Denis is a 57 y.o. female with a medical diagnosis of Cellulitis. At this time, patient is functioning at their prior level of function and does not require further acute OT services.     Plan:     During this hospitalization, patient does not require further acute OT services.  Please re-consult if situation changes.    Plan of Care Reviewed with: patient    Subjective     Chief Complaint: pain left LE  Patient/Family Comments/goals: return home     Occupational Profile:  Living Environment: Pt lives with her son in mobile home with 4 steps front entrance and 5 steps back entrance and no handrails. Pt has tub only and walk in shower with shower chair. Pt reported difficulty in/out of bathtub  Previous level of function: Pt was modified independent basic and extended ADL's and ambulated with QC  Roles and Routines: Pt drives, shops, cooks and  performs general household chores.   Equipment Used at home: cane, quad, shower chair, other (see comments) (pt has rollator and BSC from  but does not use)  Assistance upon Discharge: Pt will have assist from son     Pain/Comfort:  Pain Rating 1: 6/10  Location - Side 1: Left  Location - Orientation 1: lower  Location 1: leg  Pain Addressed 1: Nurse notified, Reposition  Pain Rating Post-Intervention 1: 6/10    Patients cultural, spiritual, Synagogue conflicts given the current situation: no    Objective:     Communicated with: Nurse Reina  prior to session.  Patient found HOB elevated with peripheral IV upon OT entry to room.    General Precautions: Standard,    Orthopedic Precautions: N/A  Braces: N/A  Respiratory  Status: Room air     Occupational Performance:    Bed Mobility:    Patient completed Supine to Sit with modified independence    Functional Mobility/Transfers:  Patient completed Sit <> Stand Transfer with modified independence  with  rolling walker   Patient completed Toilet Transfer Stand Pivot technique with modified independence with  grab bars  Functional Mobility: modified independent ambulate in room with RW for basic self care tasks     Activities of Daily Living:  Grooming: modified independence standing at sink  Bathing: modified independence wipe up EOB after s/u  Lower Body Dressing: modified independence after instruction with use sock aide   Toileting: modified independence clothing mgt and hygiene    Cognitive/Visual Perceptual:  Cognitive/Psychosocial Skills:     -       Oriented to: Person, Place, Time, and Situation   -       Follows Commands/attention:Follows multistep  commands  -       Safety awareness/insight to disability: intact     Physical Exam:  R UE dominant  B UE AROM, strength and coordination WFL;   Edema L lower leg   Sensation B UE and hands intact light touch    Treatment & Education:  Issued reacher, long shoe horn and sock aide and instructed in use; pt with good carryover of instruction and able to demonstrate use AE. Pt education on utilizing BSC over toilet at home due to low toilet seat .    Patient left sitting edge of bed with all lines intact, call button in reach, and nurse  notified    GOALS:   Multidisciplinary Problems       Occupational Therapy Goals       Not on file                    History:     Past Medical History:   Diagnosis Date    Anxiety disorder, unspecified     Chronic constipation     CKD (chronic kidney disease)     Depression     DM (diabetes mellitus)     Dyslipidemia     GERD (gastroesophageal reflux disease)     HTN (hypertension)     Morbid obesity     Sleep apnea, unspecified          Past Surgical History:   Procedure Laterality Date     ARTHROSCOPIC REMOVAL OF LOOSE BODY FROM JOINT       SECTION      CHOLECYSTECTOMY      Drainage of oral abscess      Exploration using laparoscope      UPPER GASTROINTESTINAL ENDOSCOPY         Time Tracking:     OT Date of Treatment: 01/10/23  OT Start Time: 813  OT Stop Time: 854  OT Total Time (min): 41 min    Billable Minutes:Evaluation 30  Self Care/Home Management 11    1/10/2023

## 2023-01-10 NOTE — DISCHARGE SUMMARY
[facility] Discharge Summary    Admitting Physician: Bettie Alicia MD  Attending Physician: Bettie Alicia MD  Date of Admit: 1/9/2023  Date of Discharge: 1/10/2023    Condition: Good  Outcome: Condition has improved and patient is now ready for discharge.  DISPOSITION: Home or Self Care    Discharge Diagnoses     Patient Active Problem List   Diagnosis    Mixed anxiety depressive disorder    Obesity    Chronic back pain    Diabetes mellitus without complication    Diabetic neuropathy    Hyperlipidemia LDL goal <70    Primary hypertension    Sleep apnea    Sciatica    Chronic constipation    Gastroesophageal reflux disease    Screening for colon cancer    CKD (chronic kidney disease)    Chronic bilateral low back pain with bilateral sciatica    BMI 40.0-44.9, adult    CROUCH (dyspnea on exertion)    Cellulitis       Principal Problem:  Cellulitis    Consultants and Procedures     Consultants:  IP CONSULT TO INTERNAL MEDICINE  IP CONSULT TO SOCIAL WORK/CASE MANAGEMENT  IP CONSULT TO SOCIAL WORK/CASE MANAGEMENT    Brief Admission History      Gloria Denis is a 57 y.o. female who with a history of HTN, T2DM, HLD, JORGE ALBERTO, peripheral neuorpathy, who presented to [facility] ED on 1/9/2023  with a primary complaint of Left leg pain and swelling. Patient states onset was yesterday AM located on the distal anterior left leg above the ankle. She states she noticed a bug bite a few days prior to symptom onset that was pruritic and scratched the area multiple times throughout the day. Last night, the patient reports of increase in pain and swelling in the left lower leg that has progressively worsen this AM. She states the pain is currently a 9/10 on the anterior left lower leg that radiates proximally and posteriorly. She describes the pain as throbbing and is worse with ambulation and dorsiflexion. She states she is able to bear weight. She admits she has not been compliant with her home meds due to her   recently passing on 01/05/2023 for unknown time while caring for him.    Hospital Course with Pertinent Findings     In ED VS: /85, , RR 16, SpO2 100%, T 97.9 F.  WBC 14.7, H/H 12.2/37.7. Glucose 283, BUN/Cr was 28.8/1.68. ESR 79, .5, lactic acid 1.5. US of left leg revealed no DVTs. Left leg Xray revealed no acute osseous process. Patient was given 3 L of NS bolus and 1x dose of Zosyn. Blood cultures collected prior to abx administration.    On the floors, the pt was started on IV clindamycin and cefepime for anaerobic and pseudomonal coverage for a total of 2 doses. Maintenance fluid was given at 100 cc/hr. Pain control was achieved with 1x Norco 5 mg overnight. In the morning after admission, patient stated her left leg pain and swelling has greatly improved compared to admission. Her WBC decreased from 14.7 to 13.5. Vitals were stable overnight. Her pain level was 6/10 and was able to ambulate without complaint. Blood cultures still pending.    Patient will be discharged on Norco 5 mg q6hrs prn for leg pain x3 days, clindamycin 300 mg TID x5 days to complete abx therapy and walker for home use.     Discharge physical exam:  Vitals  BP: (!) 95/58  Temp: 97.9 °F (36.6 °C)  Temp src: Oral  Pulse: 74  Resp: 20  SpO2: 98 %  Height: 5' (152.4 cm)  Weight: 111.5 kg (245 lb 13 oz)    General:  Well developed, well nourished, no acute respiratory distress  Head: Normocephalic, atraumatic  Eyes: PERRL, EOMI, anicteric sclera  Throat: No posterior pharyngeal erythema or exudate, no tonsillar exudate  Neck: supple, normal ROM, no JVD  CVS:  RRR, S1 and S2 normal, no murmurs, no added heart sounds, rubs, gallops, regular peripheral pulses, and no peripheral edema  Resp:  Lungs clear to auscultation bilaterally, no wheezes, rales, or rhonci  GI:  Abdomen soft, non-tender, non-distended, normoactive bowel sounds  MSK:  Full range of motion. Swelling (left lower leg from ankle to mid shaft, improved from  admission) and tenderness present on medial side of left lower leg.   Skin:  General: Skin is warm.      Capillary Refill: Capillary refill takes less than 2 seconds.      Findings: Erythema (located on the medial left leg, improved since admission) and lesion (2 cm circular lesion on distal anterior left lower leg) present  Neuro:  Alert and oriented x3, No focal neuro deficits  Psych:  Appropriate mood and affect           TIME SPENT ON DISCHARGE: 60 minutes    Discharge Medications        Medication List        START taking these medications      clindamycin 300 MG capsule  Commonly known as: CLEOCIN  Take 1 capsule (300 mg total) by mouth every 8 (eight) hours. for 5 days     HYDROcodone-acetaminophen 5-325 mg per tablet  Commonly known as: NORCO  Take 1 tablet by mouth every 6 (six) hours as needed for Pain.            CHANGE how you take these medications      DULoxetine 60 MG capsule  Commonly known as: CYMBALTA  Take 1 capsule (60 mg total) by mouth every evening.  What changed: Another medication with the same name was removed. Continue taking this medication, and follow the directions you see here.     omeprazole 40 MG capsule  Commonly known as: PRILOSEC  Take 1 capsule (40 mg total) by mouth once daily.  What changed: Another medication with the same name was removed. Continue taking this medication, and follow the directions you see here.            CONTINUE taking these medications      atorvastatin 40 MG tablet  Commonly known as: LIPITOR  Take 1 tablet (40 mg total) by mouth once daily.     baclofen 10 MG tablet  Commonly known as: LIORESAL  Take 1 tablet (10 mg total) by mouth 2 (two) times daily. for 7 days     betamethasone dipropionate 0.05 % cream     enalapril 2.5 MG tablet  Commonly known as: VASOTEC  Take 1 tablet (2.5 mg total) by mouth once daily.     JANUMET 50-1,000 mg per tablet  Generic drug: SITagliptan-metformin  Take 1 tablet by mouth 2 (two) times a day.     JARDIANCE 10 mg  "tablet  Generic drug: empagliflozin  Take 1 tablet (10 mg total) by mouth every morning.     LEVEMIR FLEXTOUCH U-100 INSULN 100 unit/mL (3 mL) Inpn pen  Generic drug: insulin detemir U-100  Inject 30 Units into the skin every evening.     LINZESS 290 mcg Cap capsule  Generic drug: linaCLOtide  Take 1 capsule (290 mcg total) by mouth once daily.     LOKELMA 10 gram packet  Generic drug: sodium zirconium cyclosilicate  SMARTSI Packet(s) By Mouth 3 Times a Week     metoprolol tartrate 25 MG tablet  Commonly known as: LOPRESSOR  Take 1 tablet (25 mg total) by mouth 2 (two) times daily.     PREMARIN vaginal cream  Generic drug: conjugated estrogens            ASK your doctor about these medications      BD CAYLA 2ND GEN PEN NEEDLE 32 gauge x 5/32" Ndle  Generic drug: pen needle, diabetic  SMARTSIG:Injection Every Night  Ask about: Which instructions should I use?               Where to Get Your Medications        These medications were sent to [address] [address]      Phone: [phone]   clindamycin 300 MG capsule       These medications were sent to [facility] [address] [place] [address]      Phone: [phone]   BD CAYLA 2ND GEN PEN NEEDLE 32 gauge x 5/32" Ndle       You can get these medications from any pharmacy    Bring a paper prescription for each of these medications  HYDROcodone-acetaminophen 5-325 mg per tablet         Discharge Information:   Gloria Surinder Adeel is being discharged Home or Self Care.    Discharge Procedure Orders   WALKER FOR HOME USE     Order Specific Question Answer Comments   Type of Walker: Adult (5'4"-6'6")    With wheels? Yes    Height: 5' (1.524 m)    Weight: 111.5 kg (245 lb 13 oz)    Length of need (1-99 months): 99    Does patient have medical equipment at home? cane, quad    Please check all that apply: Patient is unable to " safely ambulate without equipment.    Please check all that apply: Patient's condition impairs ambulation.      Nursing communication   Order Comments: Meds to beds        Follow-Up Appointments:   Follow-up Information       ALYX Robison. Schedule an appointment as soon as possible for a visit in 1 week(s).    Specialty: Nurse Practitioner  Why: post-toledo visit for cellulitis  Contact information:  [address] [facility]  Internal Medicine Clinic  [place] [address]  [phone]                         Andreea Borrego, PCP: schedule an appointment as soon as possible for a visit in 1 week for post-toledo visit for cellulitis     To address at follow-up:  - Clindamycin abx completion x 5 days  - blood culture results    The above information was discussed with the patient in clear terms. Patient was able to repeat the instructions to me in their own words. All questions answered. ED precautions provided.    Alfred Linares MD     [facility] Resident HO-1  01/10/2023

## 2023-01-10 NOTE — PLAN OF CARE
Problem: Adult Inpatient Plan of Care  Goal: Plan of Care Review  Outcome: Ongoing, Progressing  Goal: Patient-Specific Goal (Individualized)  Outcome: Ongoing, Progressing  Goal: Absence of Hospital-Acquired Illness or Injury  Outcome: Ongoing, Progressing  Goal: Optimal Comfort and Wellbeing  Outcome: Ongoing, Progressing  Goal: Readiness for Transition of Care  Outcome: Ongoing, Progressing     Problem: Bariatric Environmental Safety  Goal: Safety Maintained with Care  Outcome: Ongoing, Progressing     Problem: Diabetes Comorbidity  Goal: Blood Glucose Level Within Targeted Range  Outcome: Ongoing, Progressing     Problem: Pain Acute  Goal: Acceptable Pain Control and Functional Ability  Outcome: Ongoing, Progressing     Problem: Fall Injury Risk  Goal: Absence of Fall and Fall-Related Injury  Outcome: Ongoing, Progressing

## 2023-01-10 NOTE — PT/OT/SLP EVAL
Physical Therapy Evaluation  and discharge    Patient Name: Gloria Denis   MRN: [id number]  Recent Surgery: * No surgery found *      Recommendations:   Discharge Recommendations: home   Discharge Equipment Recommendations: walker, rolling   Barriers to discharge: None    Assessment:     Gloria Denis is a 57 y.o. female admitted with a medical diagnosis of Cellulitis.  1. Cellulitis of other specified site    2. Pain and swelling of lower leg    3. Cellulitis, unspecified cellulitis site    4. Hyperglycemia       She presents with the following impairments/functional limitations: weakness, impaired endurance, pain, edema, impaired skin, gait instability, impaired balance, impaired functional mobility.   Patient is limited by L LE pain with increased mobility. Pt reported feeling steady and safe with RW vs QC. PT made recommendations to MD / LAMBERT for RW. Pt discharged from acute PT services due to anticipate d/c home this pm.     Rehab Prognosis: Good; patient would benefit from acute skilled PT services to address these deficits and reach maximum level of function.  Recent Surgery: * No surgery found *      Plan:     During this hospitalization, patient to be seen  (3-5 x week) to address the above listed problems via gait training, therapeutic activities, therapeutic exercises    Plan of Care Expires: 02/09/23    Subjective     Chief Complaint: pain in left leg  Patient/Family Comments/Goals: to return home  Pain/Comfort:  Pain Rating 1: 6/10  Location - Side 1: Left  Location 1: leg  Pain Addressed 1: Reposition, Nurse notified  Pain Rating Post-Intervention 1: 6/10    Social History:  Living Environment: Patient lives with their family in a single story home, trailer, number of outside stairs: 4 in front and 5 in back without no HR, tub only, walk-in shower. She uses QC as needed  Prior Level of Function: Prior to admission, patient was independent with ADLs, driving and shopping .  Equipment  Used at Home:  cane, quad, shower chair, other (see comments) (has rollator and BSC from )  DME owned (not currently used): bedside commode, shower chair, and rollator  Assistance Upon Discharge: family    Objective:     Communicated with nurse dobson prior to session. Patient found HOB elevated with peripheral IV upon PT entry to room. OT present for eval    General Precautions: Standard,     Orthopedic Precautions:N/A   Braces: N/A   Respiratory Status: Room air    Exams:  Cognition: Patient is oriented to Person, Place, Time, Situation, Follows two-step verbal commands  RLE ROM: WFL  RLE Strength: WFL  LLE ROM: WFL  LLE Strength: WFL  Coordination: WFL  Postural Exam: Patient presented with the following abnormalities:    -       Rounded shoulders  Sensation:    -       Intact  light/touch   and proprioception      Functional Mobility:  Bed Mobility:  Supine to Sit: modified independence  Transfers:  Sit to Stand: stand by assistance vc with QC to stand and RW to sit  Gait:   Distance: 10 ft w/ QC and hands on nearby furniture and 50 ft with RW and CGA/ SBA    Gait Deviations:decreased step length, wt shift, and MARY CARMEN  Comments: improved safety with RW vs QC  Balance:   Sitting: modified independence  Standing: stand by assistance      Therapeutic Activities and Exercises:   Patient educated on role of acute care PT and PT POC and safety while in hospital including calling nurse for mobility      Patient clear to stand pivot transfer with RN/PCT, assist x1 .    Patient left sitting edge of bed with all lines intact, call button in reach, and OT prseent .    GOALS:   Multidisciplinary Problems       Physical Therapy Goals       Not on file 2* anticipated d/c home today                    History:     Past Medical History:   Diagnosis Date    Anxiety disorder, unspecified     Chronic constipation     CKD (chronic kidney disease)     Depression     DM (diabetes mellitus)     Dyslipidemia     GERD  (gastroesophageal reflux disease)     HTN (hypertension)     Morbid obesity     Sleep apnea, unspecified        Past Surgical History:   Procedure Laterality Date    ARTHROSCOPIC REMOVAL OF LOOSE BODY FROM JOINT       SECTION      CHOLECYSTECTOMY      Drainage of oral abscess      Exploration using laparoscope      UPPER GASTROINTESTINAL ENDOSCOPY         Family History   Problem Relation Age of Onset    Cancer Mother     Heart disease Mother     Diabetes Father     Heart attack Father     Kidney failure Father     Stroke Father     Cancer Brother        Social History     Socioeconomic History    Marital status:      Spouse name: Kp   Tobacco Use    Smoking status: Never    Smokeless tobacco: Never   Substance and Sexual Activity    Alcohol use: Never    Drug use: Never    Sexual activity: Not Currently     Partners: Male     Birth control/protection: Post-menopausal     Social Determinants of Health     Financial Resource Strain: Low Risk     Difficulty of Paying Living Expenses: Not very hard   Food Insecurity: No Food Insecurity    Worried About Running Out of Food in the Last Year: Never true    Ran Out of Food in the Last Year: Never true   Transportation Needs: No Transportation Needs    Lack of Transportation (Medical): No    Lack of Transportation (Non-Medical): No   Physical Activity: Inactive    Days of Exercise per Week: 0 days    Minutes of Exercise per Session: 0 min   Stress: Stress Concern Present    Feeling of Stress : To some extent   Social Connections: Moderately Isolated    Frequency of Communication with Friends and Family: More than three times a week    Frequency of Social Gatherings with Friends and Family: More than three times a week    Attends Sikhism Services: More than 4 times per year    Active Member of Clubs or Organizations: No    Attends Club or Organization Meetings: Never    Marital Status:    Housing Stability: Low Risk     Unable to Pay for Housing in  the Last Year: No    Number of Places Lived in the Last Year: 1    Unstable Housing in the Last Year: No     Time Tracking:     PT Received On: 01/10/23  PT Start Time: 0811  PT Stop Time: 0832  PT Total Time (min): 21 min     Billable Minutes: Evaluation 21    1/10/2023

## 2023-01-10 NOTE — PROGRESS NOTES
Inpatient Nutrition Evaluation    Admit Date: 1/9/2023   Total duration of encounter: 1 day    Nutrition Recommendation/Prescription     Continue diabetic diet   Will order jose bid--leg cellulitis  Pt education complete on diabetic ediet  MVI/fe  Biweekly wt  Will monitor nutrition status     Nutrition Assessment     Chart Review    Reason Seen: continuous nutrition monitoring and malnutrition screening tool (MST)    Malnutrition Screening Tool Results   Have you recently lost weight without trying?: Yes: Unsure how much  Have you been eating poorly because of a decreased appetite?: Yes   MST Score: 3     Diagnosis:  Cellulitis L Lower leg, SIRS, HTN, DM, HLD, constipation, depression     Relevant Medical History: HTN DM, HLD, JORGE ALBERTO, peripheral neuropathy, anxiety , CKD     Nutrition-Related Medications: IVF 0.9% NaCl @ 125ml/hr; atorvastatin, maxipime, clindamycin insulin lisinopril, pantoprazole     Nutrition-Related Labs:  (1-10) H/H 9.4/29.3(L) Gluc 193(H) Bun 23.4(H) Cr 1.2(H) alb 2.4(L)     Diet Order: Diet diabetic  Oral Supplement Order: none  Appetite/Oral Intake: good/% of meals  Factors Affecting Nutritional Intake: constipation  Food/Moravian/Cultural Preferences: none reported  Food Allergies: none reported    Skin Integrity: scab  Wound(s):   leg edema/scabs L leg     Comments    (1/10) Received consult for MST screen trigger. Pt reported appetite good; no N/V; unsure wt status--stated #; admit wt recorded 245#; will track wts. Pt with leg cellulitis--will order jose for wound healing. Labs acknowledged: Gluc (H)--hx DM; Bun/Cr (H)--CKD. Pt is morbidly obese; BMI > 40     Anthropometrics    Height: 5' (152.4 cm) Height Method: Stated  Last Weight: 111.5 kg (245 lb 13 oz) (01/09/23 2030) Weight Method: Bed Scale  BMI (Calculated): 48  BMI Classification: obese grade III (BMI >/=40)     Ideal Body Weight (IBW), Female: 100 lb     % Ideal Body Weight, Female (lb): 245.82 %                     Usual Body Weight (UBW), k.4 kg (pt stated UBW approx 225#; does not think she weighs 245#)  % Usual Body Weight: 109.11     Usual Weight Provided By: patient and EMR weight history    Wt Readings from Last 3 Encounters:   23 2030 111.5 kg (245 lb 13 oz)   23 0932 102 kg (224 lb 13.9 oz)   23 0814 102.1 kg (225 lb)   22 1011 105 kg (231 lb 6.4 oz)      Weight Change(s) Since Admission:  Admit Weight: 102 kg (224 lb 13.9 oz) (23)  (1/10); ? True wt 224 vs 245#: will track     Patient Education    Education Provided: diabetic diet  Teaching Method: explanation and printed materials  Comprehension: verbalizes understanding  Barriers to Learning: none evident  Expected Compliance: fair  Comments: All questions were answered and dietitian's contact information was provided.     Monitoring & Evaluation     Dietitian will monitor food and beverage intake and weight.  Nutrition Risk/Follow-Up: low (follow-up in 5-7 days)  Patients assigned 'low nutrition risk' status do not qualify for a full nutritional assessment but will be monitored and re-evaluated in a 5-7 day time period. Please consult if re-evaluation needed sooner.

## 2023-01-10 NOTE — PLAN OF CARE
Received call from Manuel at [facility], P: [phone], stating that patient has not met her deductible for insurance and would have to pay out of pocket for her rolling walker; patient refused. Spoke to patient and let her know that she can purchase a rolling walker from [employer], [employer], other drug stores, and possibly [employer] at a cheaper price than a medical supply company. Patient voiced understanding and stated that she will purchase on her own.

## 2023-01-13 ENCOUNTER — PATIENT MESSAGE (OUTPATIENT)
Dept: INTERNAL MEDICINE | Facility: CLINIC | Age: 58
End: 2023-01-13
Payer: MEDICARE

## 2023-01-14 ENCOUNTER — HOSPITAL ENCOUNTER (EMERGENCY)
Facility: HOSPITAL | Age: 58
Discharge: HOME | End: 2023-01-14
Attending: INTERNAL MEDICINE
Payer: MEDICARE

## 2023-01-14 VITALS
WEIGHT: 230.38 LBS | OXYGEN SATURATION: 100 % | SYSTOLIC BLOOD PRESSURE: 131 MMHG | HEIGHT: 60 IN | TEMPERATURE: 98 F | DIASTOLIC BLOOD PRESSURE: 75 MMHG | RESPIRATION RATE: 16 BRPM | BODY MASS INDEX: 45.23 KG/M2 | HEART RATE: 80 BPM

## 2023-01-14 DIAGNOSIS — Z51.89 ENCOUNTER FOR WOUND RE-CHECK: ICD-10-CM

## 2023-01-14 DIAGNOSIS — L03.116 CELLULITIS OF LEFT LOWER EXTREMITY: Primary | ICD-10-CM

## 2023-01-14 LAB
ANION GAP SERPL CALC-SCNC: 6 MEQ/L
BACTERIA BLD CULT: NORMAL
BACTERIA BLD CULT: NORMAL
BASOPHILS # BLD AUTO: 0.11 X10(3)/MCL (ref 0–0.2)
BASOPHILS NFR BLD AUTO: 1.2 %
BUN SERPL-MCNC: 12.9 MG/DL (ref 9.8–20.1)
CALCIUM SERPL-MCNC: 9.7 MG/DL (ref 8.4–10.2)
CHLORIDE SERPL-SCNC: 112 MMOL/L (ref 98–107)
CO2 SERPL-SCNC: 18 MMOL/L (ref 22–29)
CREAT SERPL-MCNC: 1.39 MG/DL (ref 0.55–1.02)
CREAT/UREA NIT SERPL: 9
EOSINOPHIL # BLD AUTO: 0.66 X10(3)/MCL (ref 0–0.9)
EOSINOPHIL NFR BLD AUTO: 7.4 %
ERYTHROCYTE [DISTWIDTH] IN BLOOD BY AUTOMATED COUNT: 13.3 % (ref 11.5–17)
GFR SERPLBLD CREATININE-BSD FMLA CKD-EPI: 44 MLS/MIN/1.73/M2
GLUCOSE SERPL-MCNC: 186 MG/DL (ref 74–100)
HCT VFR BLD AUTO: 33.4 % (ref 37–47)
HGB BLD-MCNC: 10.2 GM/DL (ref 12–16)
IMM GRANULOCYTES # BLD AUTO: 0.1 X10(3)/MCL (ref 0–0.04)
IMM GRANULOCYTES NFR BLD AUTO: 1.1 %
LYMPHOCYTES # BLD AUTO: 2.4 X10(3)/MCL (ref 0.6–4.6)
LYMPHOCYTES NFR BLD AUTO: 26.8 %
MCH RBC QN AUTO: 28.2 PG
MCHC RBC AUTO-ENTMCNC: 30.5 MG/DL (ref 33–36)
MCV RBC AUTO: 92.3 FL (ref 80–94)
MONOCYTES # BLD AUTO: 0.79 X10(3)/MCL (ref 0.1–1.3)
MONOCYTES NFR BLD AUTO: 8.8 %
NEUTROPHILS # BLD AUTO: 4.89 X10(3)/MCL (ref 2.1–9.2)
NEUTROPHILS NFR BLD AUTO: 54.7 %
NRBC BLD AUTO-RTO: 0 %
PLATELET # BLD AUTO: 363 X10(3)/MCL (ref 130–400)
PMV BLD AUTO: 8.8 FL (ref 7.4–10.4)
POTASSIUM SERPL-SCNC: 5.1 MMOL/L (ref 3.5–5.1)
RBC # BLD AUTO: 3.62 X10(6)/MCL (ref 4.2–5.4)
SODIUM SERPL-SCNC: 136 MMOL/L (ref 136–145)
WBC # SPEC AUTO: 9 X10(3)/MCL (ref 4.5–11.5)

## 2023-01-14 PROCEDURE — 85025 COMPLETE CBC W/AUTO DIFF WBC: CPT | Performed by: INTERNAL MEDICINE

## 2023-01-14 PROCEDURE — 80048 BASIC METABOLIC PNL TOTAL CA: CPT | Performed by: INTERNAL MEDICINE

## 2023-01-14 PROCEDURE — 99283 EMERGENCY DEPT VISIT LOW MDM: CPT

## 2023-01-14 RX ORDER — CLINDAMYCIN HYDROCHLORIDE 300 MG/1
300 CAPSULE ORAL EVERY 8 HOURS
Qty: 15 CAPSULE | Refills: 0 | Status: SHIPPED | OUTPATIENT
Start: 2023-01-14 | End: 2023-02-01 | Stop reason: ALTCHOICE

## 2023-01-14 NOTE — ED PROVIDER NOTES
Encounter Date: 2023       History     Chief Complaint   Patient presents with    Wound Infection     Recently admitted with left leg cellulitis. Now having purulent drainage from the area.     The history is provided by the patient.   Wound Check   Treatments since wound repair include oral antibiotics and regular soap and water washings. There has been no drainage from the wound. The swelling has improved. The pain has improved. Has 4 doses left of abx. Admitted to obs for cellulitis on 2023 and given clindamycin. States symptoms are improving has has concern for raised lesion.    Review of patient's allergies indicates:   Allergen Reactions    Aspirin      Past Medical History:   Diagnosis Date    Anxiety disorder, unspecified     Chronic constipation     CKD (chronic kidney disease)     Depression     DM (diabetes mellitus)     Dyslipidemia     GERD (gastroesophageal reflux disease)     HTN (hypertension)     Morbid obesity     Sleep apnea, unspecified      Past Surgical History:   Procedure Laterality Date    ARTHROSCOPIC REMOVAL OF LOOSE BODY FROM JOINT       SECTION      CHOLECYSTECTOMY      Drainage of oral abscess      Exploration using laparoscope      UPPER GASTROINTESTINAL ENDOSCOPY       Family History   Problem Relation Age of Onset    Cancer Mother     Heart disease Mother     Diabetes Father     Heart attack Father     Kidney failure Father     Stroke Father     Cancer Brother      Social History     Tobacco Use    Smoking status: Never    Smokeless tobacco: Never   Substance Use Topics    Alcohol use: Never    Drug use: Never     Review of Systems   HENT:  Negative for congestion.    Respiratory:  Negative for cough and shortness of breath.    Cardiovascular:  Negative for chest pain.   Gastrointestinal:  Negative for constipation, diarrhea and nausea.   Skin:  Positive for wound.   Neurological:  Negative for weakness.   All other systems reviewed and are negative.    Physical  Exam     Initial Vitals [01/14/23 1227]   BP Pulse Resp Temp SpO2   131/75 80 16 98.3 °F (36.8 °C) 100 %      MAP       --         Physical Exam    Nursing note and vitals reviewed.  Constitutional: She appears well-developed and well-nourished. She is not diaphoretic.   HENT:   Mouth/Throat: Oropharynx is clear and moist.   Eyes: Pupils are equal, round, and reactive to light.   Cardiovascular:  Normal rate and regular rhythm.           No murmur heard.  Distal pulses 2+ b/l   Pulmonary/Chest: She has no wheezes. She has no rales.     Neurological: She is alert and oriented to person, place, and time. She has normal strength. GCS score is 15. GCS eye subscore is 4. GCS verbal subscore is 5. GCS motor subscore is 6.   Skin: Skin is warm and dry.   Raised plaque lesion with surrounding erythema. No drainage or signs concerning for infection.            ED Course   Procedures  Labs Reviewed   BASIC METABOLIC PANEL - Abnormal; Notable for the following components:       Result Value    Chloride 112 (*)     Carbon Dioxide 18 (*)     Glucose Level 186 (*)     Creatinine 1.39 (*)     All other components within normal limits   CBC WITH DIFFERENTIAL - Abnormal; Notable for the following components:    RBC 3.62 (*)     Hgb 10.2 (*)     Hct 33.4 (*)     MCHC 30.5 (*)     IG# 0.10 (*)     All other components within normal limits   CBC W/ AUTO DIFFERENTIAL    Narrative:     The following orders were created for panel order CBC auto differential.  Procedure                               Abnormality         Status                     ---------                               -----------         ------                     CBC with Differential[[id number]]        Abnormal            Final result                 Please view results for these tests on the individual orders.          Imaging Results    None          Medications - No data to display  Medical Decision Making:   Clinical Tests:   Lab Tests: Ordered and Reviewed  The  following lab test(s) were unremarkable: BNP and CBC          Attending Attestation:   Physician Attestation Statement for Resident:  As the supervising MD   Physician Attestation Statement: I have personally seen and examined this patient.   I agree with the above history.  -:   As the supervising MD I agree with the above PE.     As the supervising MD I agree with the above treatment, course, plan, and disposition.    I have reviewed and agree with the residents interpretation of the following: lab data.             Attending ED Notes:   I performed a face to face evaluation of the patient Gloria Denis and my history reveal Hx of DM, recently admitted and discharge in clindamycin for left lower leg cellulitis presents with a raised lesion on the leg the physical exam was remarkable for plaque over area of cellulitis on Lt leg.  I reviewed the history, physical exam and diagnostic studies performed by the resident Dr. Collado and I concur with the diagnosis and assessment. This case was initially evaluated by Dr. Collado  under my supervision and I agree with the documentation and plan.    Total teaching time: 10 min                     Clinical Impression:   Final diagnoses:  [L03.116] Cellulitis of left lower extremity (Primary)  [Z51.89] Encounter for wound re-check        ED Disposition Condition    Discharge Stable          ED Prescriptions       Medication Sig Dispense Start Date End Date Auth. Provider    clindamycin (CLEOCIN) 300 MG capsule Take 1 capsule (300 mg total) by mouth every 8 (eight) hours. for 5 days 15 capsule 1/14/2023 1/19/2023 Milagros Collado MD          Follow-up Information       Follow up With Specialties Details Why Contact Info    ALYX Foreman Nurse Practitioner In 1 week  [address] [facility]  Internal Medicine Clinic  [facility] [address] [phone]      [facility] Emergency Medicine  As needed, If symptoms worsen [address]  [facility] [address]  [phone]             Milagros Collado MD  Resident  01/14/23 1346       Arcenio Tijerina MD  01/14/23 9058

## 2023-01-17 ENCOUNTER — PATIENT MESSAGE (OUTPATIENT)
Dept: GYNECOLOGY | Facility: CLINIC | Age: 58
End: 2023-01-17
Payer: MEDICARE

## 2023-01-17 DIAGNOSIS — B37.31 YEAST VAGINITIS: Primary | ICD-10-CM

## 2023-01-17 RX ORDER — FLUCONAZOLE 150 MG/1
150 TABLET ORAL DAILY
Qty: 2 TABLET | Refills: 0 | Status: SHIPPED | OUTPATIENT
Start: 2023-01-17 | End: 2023-05-01 | Stop reason: ALTCHOICE

## 2023-01-19 NOTE — TELEPHONE ENCOUNTER
Pt seen in ED on 1/14/23 for cellulitis. Started on course of treatment with clindamycin 300 mg every 8 hours x 5 days. Pt states she is seeing a big improvement since starting antibiotics. No questions or c/o voiced at this time.

## 2023-01-23 DIAGNOSIS — B37.31 YEAST VAGINITIS: ICD-10-CM

## 2023-01-24 DIAGNOSIS — L30.4 INTERTRIGO: Primary | ICD-10-CM

## 2023-01-24 RX ORDER — CLOTRIMAZOLE 1 %
CREAM (GRAM) TOPICAL
Qty: 45 G | Refills: 0 | OUTPATIENT
Start: 2023-01-24

## 2023-01-24 RX ORDER — FLUCONAZOLE 150 MG/1
TABLET ORAL
Qty: 2 TABLET | Refills: 0 | OUTPATIENT
Start: 2023-01-24

## 2023-01-24 RX ORDER — CLOTRIMAZOLE 1 %
CREAM (GRAM) TOPICAL 2 TIMES DAILY
Qty: 45 G | Refills: 0 | Status: SHIPPED | OUTPATIENT
Start: 2023-01-24 | End: 2023-05-01 | Stop reason: ALTCHOICE

## 2023-01-24 NOTE — TELEPHONE ENCOUNTER
Spoke to Patient and she stated she is still having issues but she will contact her primary care doctor. Patient has no other request at this time.

## 2023-02-01 ENCOUNTER — OFFICE VISIT (OUTPATIENT)
Dept: INTERNAL MEDICINE | Facility: CLINIC | Age: 58
End: 2023-02-01
Payer: MEDICARE

## 2023-02-01 ENCOUNTER — APPOINTMENT (OUTPATIENT)
Dept: LAB | Facility: HOSPITAL | Age: 58
End: 2023-02-01
Attending: NURSE PRACTITIONER
Payer: MEDICARE

## 2023-02-01 VITALS
RESPIRATION RATE: 18 BRPM | DIASTOLIC BLOOD PRESSURE: 68 MMHG | WEIGHT: 225 LBS | HEIGHT: 60 IN | HEART RATE: 84 BPM | BODY MASS INDEX: 44.17 KG/M2 | SYSTOLIC BLOOD PRESSURE: 100 MMHG | TEMPERATURE: 98 F

## 2023-02-01 DIAGNOSIS — M54.42 CHRONIC BILATERAL LOW BACK PAIN WITH BILATERAL SCIATICA: Primary | ICD-10-CM

## 2023-02-01 DIAGNOSIS — E11.22 TYPE 2 DIABETES MELLITUS WITH STAGE 3A CHRONIC KIDNEY DISEASE, WITH LONG-TERM CURRENT USE OF INSULIN: ICD-10-CM

## 2023-02-01 DIAGNOSIS — M54.41 CHRONIC BILATERAL LOW BACK PAIN WITH BILATERAL SCIATICA: Primary | ICD-10-CM

## 2023-02-01 DIAGNOSIS — I10 PRIMARY HYPERTENSION: ICD-10-CM

## 2023-02-01 DIAGNOSIS — Z79.4 TYPE 2 DIABETES MELLITUS WITH STAGE 3A CHRONIC KIDNEY DISEASE, WITH LONG-TERM CURRENT USE OF INSULIN: ICD-10-CM

## 2023-02-01 DIAGNOSIS — N18.31 TYPE 2 DIABETES MELLITUS WITH STAGE 3A CHRONIC KIDNEY DISEASE, WITH LONG-TERM CURRENT USE OF INSULIN: ICD-10-CM

## 2023-02-01 DIAGNOSIS — G89.29 CHRONIC BILATERAL LOW BACK PAIN WITH BILATERAL SCIATICA: Primary | ICD-10-CM

## 2023-02-01 DIAGNOSIS — F41.8 MIXED ANXIETY DEPRESSIVE DISORDER: ICD-10-CM

## 2023-02-01 PROCEDURE — 99214 PR OFFICE/OUTPT VISIT, EST, LEVL IV, 30-39 MIN: ICD-10-PCS | Mod: S$PBB,,, | Performed by: NURSE PRACTITIONER

## 2023-02-01 PROCEDURE — 99214 OFFICE O/P EST MOD 30 MIN: CPT | Mod: S$PBB,,, | Performed by: NURSE PRACTITIONER

## 2023-02-01 PROCEDURE — 99214 OFFICE O/P EST MOD 30 MIN: CPT | Mod: PBBFAC | Performed by: NURSE PRACTITIONER

## 2023-02-01 RX ORDER — INSULIN PUMP SYRINGE, 3 ML
EACH MISCELLANEOUS
Qty: 1 EACH | Refills: 0 | Status: SHIPPED | OUTPATIENT
Start: 2023-02-01

## 2023-02-01 RX ORDER — MELOXICAM 7.5 MG/1
7.5 TABLET ORAL DAILY PRN
Qty: 10 TABLET | Refills: 0 | Status: SHIPPED | OUTPATIENT
Start: 2023-02-01 | End: 2023-06-29 | Stop reason: ALTCHOICE

## 2023-02-01 RX ORDER — INSULIN DETEMIR 100 [IU]/ML
30 INJECTION, SOLUTION SUBCUTANEOUS NIGHTLY
Qty: 27 ML | Refills: 1 | Status: SHIPPED | OUTPATIENT
Start: 2023-02-01 | End: 2023-02-06 | Stop reason: DRUGHIGH

## 2023-02-01 RX ORDER — SITAGLIPTIN AND METFORMIN HYDROCHLORIDE 1000; 50 MG/1; MG/1
1 TABLET, FILM COATED ORAL 2 TIMES DAILY
Qty: 180 TABLET | Refills: 1 | Status: SHIPPED | OUTPATIENT
Start: 2023-02-01 | End: 2023-08-08 | Stop reason: SDUPTHER

## 2023-02-01 RX ORDER — LANCETS
EACH MISCELLANEOUS
Qty: 100 EACH | Refills: 3 | Status: SHIPPED | OUTPATIENT
Start: 2023-02-01 | End: 2024-01-23

## 2023-02-01 RX ORDER — FLUCONAZOLE 200 MG/1
200 TABLET ORAL EVERY OTHER DAY
COMMUNITY
End: 2023-08-08 | Stop reason: ALTCHOICE

## 2023-02-01 RX ORDER — ENALAPRIL MALEATE 2.5 MG/1
2.5 TABLET ORAL DAILY
Qty: 90 TABLET | Refills: 1 | Status: SHIPPED | OUTPATIENT
Start: 2023-02-01 | End: 2023-08-03

## 2023-02-01 RX ORDER — BACLOFEN 10 MG/1
10 TABLET ORAL 2 TIMES DAILY
Qty: 14 TABLET | Refills: 0 | Status: SHIPPED | OUTPATIENT
Start: 2023-02-01 | End: 2023-05-01 | Stop reason: SDUPTHER

## 2023-02-01 RX ORDER — DULOXETIN HYDROCHLORIDE 60 MG/1
60 CAPSULE, DELAYED RELEASE ORAL NIGHTLY
Qty: 90 CAPSULE | Refills: 3 | Status: SHIPPED | OUTPATIENT
Start: 2023-02-01 | End: 2024-02-06 | Stop reason: SDUPTHER

## 2023-02-01 NOTE — PROGRESS NOTES
Andreea Borrego, ALYX   [facility]  [address] [facility] [address]      PATIENT NAME: Gloria Denis  : 1965  DATE: 23  MRN: [id number]      Reason for Visit / Chief Complaint: Follow-up       History of Present Illness / Problem Focused Workflow     Gloria Denis is a 57 y.o. Black or  female presents to the clinic for diabetes and HTN f/u. PMH HTN, dyslipidemia, JORGE ALBERTO on Bipap, DM, DM neuropathy, depression, anxiety, CKD, hyperkalemia, GERD, chronic constipation, morbid obesity, atrophic vaginitis, Yolanda-Yolanda dz, and chronic back pain. Followed by [facility] cardio, GYN, renal and GI clinics. Pt canceled optho visit to establish care for L NPDR d/t  was ill. Of note, pt also had inpt admit for LLE cellulitis and has upcoming post [place] visit.     Today, pt reports has been taking it one day at a time since 's passing away; condolences provided. Admits had not been taking meds as prescribed or following ADA diet initially after his death. Has since gotten back on track. Has misplaced glucometer; requesting new rx. Is wearing Bipap nightly. Reports bilateral low back pain with sciatica, R>L, which is chronic. Continues to use cane with ambulation. Declines vaccines today. Denies chest pain, shortness of breath, cough, fever, headache, dizziness, weakness, abdominal pain, nausea, vomiting, diarrhea, constipation, dysuria, SI/HI.    Review of Systems     Review of Systems     See HPI for details    Constitutional: Denies Change in appetite. Denies Chills. Denies Fever. Denies Night sweats.  Eye: Denies Blurred vision. Denies Discharge. Denies Eye pain.  ENT: Denies Decreased hearing. Denies Sore throat. Denies Swollen glands.  Respiratory: Denies Cough. Denies Shortness of breath. Denies Shortness of breath with exertion. Denies Wheezing.  Cardiovascular: DeniesChest pain at rest. Denies  Chest pain with exertion. Denies Irregular heartbeat. Denies Palpitations. Denies Edema.  Gastrointestinal: Denies Abdominal pain. Denies Diarrhea. Denies Nausea. Denies Vomiting. Denies Hematemesis or Hematochezia.  Genitourinary: Denies Dysuria. Denies Urinary frequency. Denies Urinary urgency. Denies Blood in urine.  Endocrine: Denies Cold intolerance. Denies Excessive thirst. Denies Heat intolerance. Denies Weight loss. Denies Weight gain.  Musculoskeletal: Admits Painful joints. Denies Weakness.  Integumentary: Denies Rash. Denies Itching. Denies Dry skin.  Neurologic: Denies Dizziness. Denies Fainting. Denies Headache.  Psychiatric: Denies Depression. Denies Anxiety. Denies Suicidal/Homicidal ideations.    All Other ROS: Negative except as stated in HPI.     Medical / Surgical / Social / Family History       ----------------------------  Anxiety disorder, unspecified  Chronic constipation  CKD (chronic kidney disease)  Depression  DM (diabetes mellitus)  Dyslipidemia  GERD (gastroesophageal reflux disease)  HTN (hypertension)  Morbid obesity  Sleep apnea, unspecified     Past Surgical History:   Procedure Laterality Date    ARTHROSCOPIC REMOVAL OF LOOSE BODY FROM JOINT       SECTION      CHOLECYSTECTOMY      Drainage of oral abscess      Exploration using laparoscope      UPPER GASTROINTESTINAL ENDOSCOPY         Social History     Socioeconomic History    Marital status:      Spouse name: Kp   Tobacco Use    Smoking status: Never    Smokeless tobacco: Never   Substance and Sexual Activity    Alcohol use: Never    Drug use: Never    Sexual activity: Not Currently     Partners: Male     Birth control/protection: Post-menopausal     Social Determinants of Health     Financial Resource Strain: Low Risk     Difficulty of Paying Living Expenses: Not very hard   Food Insecurity: No Food Insecurity    Worried About Running Out of Food in the Last Year: Never true    Ran Out of Food in the Last Year:  "Never true   Transportation Needs: No Transportation Needs    Lack of Transportation (Medical): No    Lack of Transportation (Non-Medical): No   Physical Activity: Inactive    Days of Exercise per Week: 0 days    Minutes of Exercise per Session: 0 min   Stress: Stress Concern Present    Feeling of Stress : To some extent   Social Connections: Moderately Isolated    Frequency of Communication with Friends and Family: More than three times a week    Frequency of Social Gatherings with Friends and Family: More than three times a week    Attends Orthodox Services: More than 4 times per year    Active Member of Clubs or Organizations: No    Attends Club or Organization Meetings: Never    Marital Status:    Housing Stability: Low Risk     Unable to Pay for Housing in the Last Year: No    Number of Places Lived in the Last Year: 1    Unstable Housing in the Last Year: No        Family History   Problem Relation Age of Onset    Cancer Mother     Heart disease Mother     Diabetes Father     Heart attack Father     Kidney failure Father     Stroke Father     Cancer Brother         Medications and Allergies     Medications  Current Outpatient Medications   Medication Instructions    atorvastatin (LIPITOR) 40 mg, Oral, Daily    baclofen (LIORESAL) 10 mg, Oral, 2 times daily    BD CAYLA 2ND GEN PEN NEEDLE 32 gauge x 5/32" Ndle SMARTSIG:Injection Every Night    betamethasone dipropionate 0.05 % cream   See Instructions, APPLY  CREAM TOPICALLY TWICE DAILY, # 60 gm, 0 Refill(s), Pharmacy: [id number], 155, cm, Height/Length Dosing, 07/08/21 8:39:00 CDT, 112, kg, Weight Dosing, 07/08/21 11:11:00 CDT    blood sugar diagnostic Strp To check BG 2 times daily, to use with insurance preferred meter    blood-glucose meter kit To check BG 2 times daily, to use with insurance preferred meter    clotrimazole (LOTRIMIN) 1 % cream Topical (Top), 2 times daily    conjugated estrogens (PREMARIN) vaginal cream   See Instructions, " Apply dime size amout to vaginal wall with a gloved finger x1 week then use 2x/week going forward. Do not use with intecourse., # 30 gm, 2 Refill(s), Pharmacy: [facility], 155, cm, Height/Length Dosing, 21...    DULoxetine (CYMBALTA) 60 mg, Oral, Nightly    empagliflozin (JARDIANCE) 10 mg, Oral, Daily    enalapril (VASOTEC) 2.5 mg, Oral, Daily    fluconazole (DIFLUCAN) 150 mg, Oral, Daily, May repeat in 3 days if still with symptoms.    fluconazole (DIFLUCAN) 200 mg, Oral, Every other day    hydrOXYzine pamoate (VISTARIL) 25 MG Cap TAKE 1 CAPSULE BY MOUTH THREE TIMES DAILY AS NEEDED FOR ANXIETY    lancets Misc To check BG 2 times daily, to use with insurance preferred meter    LEVEMIR FLEXTOUCH U-100 INSULN 30 Units, Subcutaneous, Nightly    LINZESS 290 mcg, Oral, Daily    LOKELMA 10 gram packet SMARTSI Packet(s) By Mouth 3 Times a Week    meloxicam (MOBIC) 7.5 mg, Oral, Daily PRN    metoprolol tartrate (LOPRESSOR) 25 mg, Oral, 2 times daily    omeprazole (PRILOSEC) 40 mg, Oral, Daily    SITagliptan-metformin (JANUMET) 50-1,000 mg per tablet 1 tablet, Oral, 2 times daily         Allergies  Review of patient's allergies indicates:   Allergen Reactions    Aspirin        Physical Examination     /68 (BP Location: Right arm, Patient Position: Sitting)   Pulse 84   Temp 98.2 °F (36.8 °C)   Resp 18   Ht 5' (1.524 m)   Wt 102.1 kg (225 lb)   BMI 43.94 kg/m²     Physical Exam  Constitutional:       Appearance: She is morbidly obese.   Musculoskeletal:      Lumbar back: Tenderness present. Decreased range of motion.   Skin:             General: Alert and oriented, No acute distress.  Head: Normocephalic, Atraumatic.  Eye: Pupils are equal, round and reactive to light, Extraocular movements are intact, Sclera non-icteric.  Ears/Nose/Throat: Normal, Mucosa moist,Clear.  Neck/Thyroid: Supple, Non-tender, No carotid bruit, No lymphadenopathy, No JVD, Full range of motion.  Respiratory:  Clear to auscultation bilaterally; No wheezes, rales or rhonchi,Non-labored respirations, Symmetrical chest wall expansion.  Cardiovascular: Regular rate and rhythm, S1/S2 normal, No murmurs, rubs or gallops.  Gastrointestinal: Soft, Non-tender, Non-distended, Normal bowel sounds, No palpable organomegaly.  Musculoskeletal: Normal range of motion.  Integumentary: Warm, Dry, Intact, No suspicious lesions or rashes.  Extremities: No clubbing, cyanosis or edema  Neurologic: No focal deficits, Cranial Nerves II-XII are grossly intact, Motor strength normal upper and lower extremities, Sensory exam intact.  Psychiatric: Normal interaction, Coherent speech, Euthymic mood, Appropriate affect       Results     Lab Results   Component Value Date    WBC 9.0 01/14/2023    HGB 10.2 (L) 01/14/2023    HCT 33.4 (L) 01/14/2023     01/14/2023    CHOL 148 01/10/2023    TRIG 104 01/10/2023    ALT 12 01/10/2023    AST 16 01/10/2023     01/14/2023    K 5.1 01/14/2023    CREATININE 1.39 (H) 01/14/2023    BUN 12.9 01/14/2023    CO2 18 (L) 01/14/2023    TSH 1.073 01/10/2023    HGBA1C 9.0 (H) 01/10/2023         Assessment and Plan (including Health Maintenance)     Plan:     1. Type 2 diabetes mellitus with stage 3a chronic kidney disease, with long-term current use of insulin  A1C 9.0 not at goal. Previous A1C 6.8.   Pt with recent infection.  Continue janumet, jardiance, and levemir.  Rx glucometer, lancets and strips.  Follow ADA diet.  Avoid soda, simple sweets, and limit rice/pasta/bread/starches and consume brown options when possible.   Maintain healthy weight with BMI goal <30.   Perform aerobic exercise for 150 minutes per week (or 5 days a week for 30 minutes each day).   Examine feet daily.   Obtain annual dilated eye exam.  Eye exam: 4/12/22  Foot exam: 4/12/22    - blood-glucose meter kit; To check BG 2 times daily, to use with insurance preferred meter  Dispense: 1 each; Refill: 0  - lancets Misc; To check BG 2  times daily, to use with insurance preferred meter  Dispense: 100 each; Refill: 3  - CBC Auto Differential; Future  - Hemoglobin A1C; Future  - Comprehensive Metabolic Panel; Future  - blood sugar diagnostic Strp; To check BG 2 times daily, to use with insurance preferred meter  Dispense: 100 each; Refill: 3    2. Primary hypertension  At goal.  Continue enalapril and metoprolol.  Follow a low sodium (less than 2 grams of sodium per day), DASH diet.   Continue medications as prescribed.  Monitor blood pressure and report any consistent values greater than 140/90 and keep a log.  Maintain healthy weight with a BMI goal of <30.   Aerobic exercise for 150 minutes per week (or 5 days a week for 30 minutes each day).      3. Chronic bilateral low back pain with bilateral sciatica  Declines toradol injection today.  Rx mobic prn x 10 days.  Refilled baclofen.  Perform back stretches daily.   Avoid activities than increase back pain or stiffness.   Apply heating pad, ice pack, and BioFreeze as needed; alternate every 15-20 minutes.   Massage back to loosen muscles.   Continue tylenol arthritis/muscle relaxer as needed.  Fall precautions.  Continue cane with ambulation.    4. Mixed anxiety depressive disorder  Improving.  Continue cymbalta.  Denies SI/HI.  Read positive daily meditations, avoid negative media, set healthy boundaries.  Exercise daily, keep consistent sleep pattern, eat a healthy diet.  Establish good social support, make changes to reduce stress.  Do not drink alcohol or use illicit drugs.  Reports any symptoms of suicidal/homicidal ideations or self harm immediately, go to nearest emergency room.      5. BMI 40.0-44.9, adult  BMI 43. Goal BMI <30.  Aerobic exercise 150 minutes per week.  Avoid soda, simple sugars, sweets, excessive rice, pasta, potatoes or bread.   Choose brown options when available and portion control.  Limit fast foods and fried foods.   Choose complex carbs in moderation (ex: green,  leafy vegetables, beans, oatmeal).  Eat plenty of fresh fruits and vegetables with lean meats daily.   Consider permanent healthy lifestyle changes.        Problem List Items Addressed This Visit          Cardiac/Vascular    Primary hypertension       Endocrine    BMI 40.0-44.9, adult    Type 2 diabetes mellitus with stage 3a chronic kidney disease, with long-term current use of insulin    Relevant Medications    SITagliptan-metformin (JANUMET) 50-1,000 mg per tablet    insulin detemir U-100 (LEVEMIR FLEXTOUCH U-100 INSULN) 100 unit/mL (3 mL) InPn pen    blood-glucose meter kit    lancets Misc    blood sugar diagnostic Strp    Other Relevant Orders    CBC Auto Differential    Hemoglobin A1C    Comprehensive Metabolic Panel       Orthopedic    Chronic bilateral low back pain with bilateral sciatica - Primary         Health Maintenance Due   Topic Date Due    Pneumococcal Vaccines (Age 0-64) (1 - PCV) Never done    Eye Exam  Never done    Shingles Vaccine (1 of 2) Never done    Colorectal Cancer Screening  05/22/2018    COVID-19 Vaccine (4 - Booster for Pfizer series) 12/15/2021    Influenza Vaccine (1) Never done    Diabetes Urine Screening  10/11/2022       Follow up in about 3 months (around 5/1/2023) for Follow up, Labs one week prior to appt. .        Signature:  ALYX Robison  [facility]  [address] [facility] [address]

## 2023-02-06 ENCOUNTER — TELEPHONE (OUTPATIENT)
Dept: INTERNAL MEDICINE | Facility: CLINIC | Age: 58
End: 2023-02-06
Payer: MEDICARE

## 2023-02-06 RX ORDER — INSULIN DETEMIR 100 [IU]/ML
30 INJECTION, SOLUTION SUBCUTANEOUS NIGHTLY
Qty: 30 ML | Refills: 3 | Status: SHIPPED | OUTPATIENT
Start: 2023-02-06 | End: 2023-07-17

## 2023-02-06 NOTE — TELEPHONE ENCOUNTER
Pharmacy faxed levemir pen is currently on back order, they are asking to switch to vials. Please advise.

## 2023-02-08 ENCOUNTER — PATIENT MESSAGE (OUTPATIENT)
Dept: ADMINISTRATIVE | Facility: HOSPITAL | Age: 58
End: 2023-02-08
Payer: MEDICARE

## 2023-02-20 ENCOUNTER — OFFICE VISIT (OUTPATIENT)
Dept: INTERNAL MEDICINE | Facility: CLINIC | Age: 58
End: 2023-02-20
Payer: MEDICARE

## 2023-02-20 VITALS
HEART RATE: 75 BPM | OXYGEN SATURATION: 99 % | HEIGHT: 60 IN | WEIGHT: 222.19 LBS | BODY MASS INDEX: 43.62 KG/M2 | DIASTOLIC BLOOD PRESSURE: 78 MMHG | RESPIRATION RATE: 18 BRPM | TEMPERATURE: 98 F | SYSTOLIC BLOOD PRESSURE: 110 MMHG

## 2023-02-20 DIAGNOSIS — R80.9 TYPE 2 DIABETES MELLITUS WITH MICROALBUMINURIA, WITHOUT LONG-TERM CURRENT USE OF INSULIN: ICD-10-CM

## 2023-02-20 DIAGNOSIS — I10 HYPERTENSION, UNSPECIFIED TYPE: Primary | ICD-10-CM

## 2023-02-20 DIAGNOSIS — L03.116 LEFT LEG CELLULITIS: ICD-10-CM

## 2023-02-20 DIAGNOSIS — G63 POLYNEUROPATHY ASSOCIATED WITH UNDERLYING DISEASE: ICD-10-CM

## 2023-02-20 DIAGNOSIS — E78.5 HYPERLIPIDEMIA, UNSPECIFIED HYPERLIPIDEMIA TYPE: ICD-10-CM

## 2023-02-20 DIAGNOSIS — G47.33 OSA (OBSTRUCTIVE SLEEP APNEA): ICD-10-CM

## 2023-02-20 DIAGNOSIS — E11.29 TYPE 2 DIABETES MELLITUS WITH MICROALBUMINURIA, WITHOUT LONG-TERM CURRENT USE OF INSULIN: ICD-10-CM

## 2023-02-20 PROCEDURE — 99215 OFFICE O/P EST HI 40 MIN: CPT | Mod: PBBFAC

## 2023-02-20 RX ORDER — NYSTATIN 100000 [USP'U]/G
POWDER TOPICAL
COMMUNITY
Start: 2023-01-26 | End: 2024-03-14 | Stop reason: ALTCHOICE

## 2023-02-20 RX ORDER — INSULIN DETEMIR 100 [IU]/ML
INJECTION, SOLUTION SUBCUTANEOUS
COMMUNITY
Start: 2023-02-17 | End: 2023-08-08 | Stop reason: SDUPTHER

## 2023-02-20 RX ORDER — KETOCONAZOLE 20 MG/G
CREAM TOPICAL
COMMUNITY
Start: 2023-01-26 | End: 2024-03-14 | Stop reason: ALTCHOICE

## 2023-02-20 NOTE — PROGRESS NOTES
[facility] Post Wards Visit    Chief Complaint:      Post wards follow-up    Subjective:     HPI:  57-year-old  female with significant medical history of hypertension, type 2 DM, HLD, JORGE ALBERTO, peripheral neuropathy was recently admitted from 01/09-1/10 for left leg cellulitis, she was discharged home with clindamycin 300 every 8 hours for 5 days, Norco q.6 as needed of pain.  Today she presents here for post wards follow-up   PCP:ALYX Foreman at [facility]  Today:  Patient is here for routine post wards follow-up.    He is doing well overall, swelling, redness improved significantly denies any fevers, completed course of antibiotics.  No complaints otherwise.      Review of Systems  A comprehensive 12 point review of systems was completed.  Please see above for pertinent positives and negatives.     Objective:   Last 24 Hour Vital Signs:       Physical Examination:  General: Awake, alert, & oriented to person, place & time. No acute distress  Psychiatric: Mood and affect normal  HEENT: Normocephalic, atraumatic. Face symmetric.  Cardiovascular: Regular rate & rhythm, Normal S1 & S2 w/out murmurs, rubs or gallops, No JVD appreciated, 2+ pulses throughout  Pulmonary: Bilateral symmetric chest rise, Non-labored, no wheezes, rhonchi or crackles are appreciated  Abdominal:  Soft nontender nondistended Bowel sounds present  Extremities: No clubbing, cyanosis or edema.  Skin:  Exposed skin is warm & dry.  Neuro:   Patient moves all extremities equally. Sensation intact bilateraly.    Labs  CMP:   Recent Labs   Lab 10/01/21  0942 10/11/21  0800 10/20/21  0925 01/24/22  0917 02/21/22  1011 06/22/22  0806 01/09/23  1041 01/10/23  0503 01/14/23  1300   Sodium Level 137 138   < > 137   < > 143 134 L 137 136   Potassium Level 5.7 H 5.0   < > 5.5 H   < > 4.3 4.6 4.3 5.1   Chloride 107 108 H   < > 108 H   < > 111 H 102 113 H 112 H   Carbon Dioxide 27 23   < > 21 L   < > 21 L 22 17 L 18 L   Blood Urea  Nitrogen 19.7 17.4   < > 22.8 H   < > 14.9 28.8 H 23.4 H 12.9   Creatinine 1.47 H 1.20 H   < > 1.27 H   < > 1.49 H 1.68 H 1.22 H 1.39 H   Glucose Level 133 H 131 H   < > 161 H   < > 149 H 283 H 193 H 186 H   Calcium Level Total 9.6 9.9   < > 9.6   < > 9.5 10.2 8.2 L 9.7   Albumin Level 3.6 3.5  --  3.7  --  3.7  --  2.4 L  --    Bilirubin Total 0.3 0.3  --  0.5  --  0.4  --  0.4  --    Bilirubin Direct 0.1 0.1  --  0.2  --   --   --   --   --    Bilirubin Indirect 0.20 0.20  --  0.30  --   --   --   --   --    Aspartate Aminotransferase 35 H 31  --  28  --  24  --  16  --    Alanine Aminotransferase 30 22  --  21  --  15  --  12  --    Alkaline Phosphatase 74 69  --  75  --  68  --  86  --     < > = values in this interval not displayed.   ,   CBC:   Recent Labs   Lab 01/09/23  1041 01/10/23  0503 01/14/23  1300   WBC 14.7 H 13.5 H 9.0   Neut # 10.59 H 10.23 H 4.89   RBC 4.27 3.31 L 3.62 L   Hgb 12.2 9.4 L 10.2 L   Hct 37.7 29.3 L 33.4 L   Platelet 313 252 363   MCV 88.3 88.5 92.3   RDW 13.0 13.1 13.3       Assessment & Plan:     HTN.    Type 2 DM.    HLD.    JORGE ALBERTO.    Peripheral neuropathy.    Left leg cellulitis admitted on 01/09-discharged on 01/10.    Patient improved with course of clindamycin, redness, left leg swelling significantly improved, no fevers.    Patient doing well.    To follow up with PCP to continue her other home meds as otherwise.        Follow-ups  -Follow-up with PCP as scheduled      Yandel Gannon MD  [facility] Resident PGY-3

## 2023-03-20 ENCOUNTER — DOCUMENTATION ONLY (OUTPATIENT)
Dept: ADMINISTRATIVE | Facility: HOSPITAL | Age: 58
End: 2023-03-20
Payer: MEDICARE

## 2023-04-06 DIAGNOSIS — E87.5 HYPERKALEMIA: Primary | ICD-10-CM

## 2023-04-06 RX ORDER — SODIUM ZIRCONIUM CYCLOSILICATE 10 G/10G
POWDER, FOR SUSPENSION ORAL
Qty: 45 PACKET | Refills: 0 | Status: SHIPPED | OUTPATIENT
Start: 2023-04-06 | End: 2023-07-21 | Stop reason: SDUPTHER

## 2023-04-11 DIAGNOSIS — N18.9 CHRONIC KIDNEY DISEASE, UNSPECIFIED CKD STAGE: Primary | ICD-10-CM

## 2023-04-28 ENCOUNTER — APPOINTMENT (OUTPATIENT)
Dept: LAB | Facility: HOSPITAL | Age: 58
End: 2023-04-28
Attending: NURSE PRACTITIONER
Payer: MEDICARE

## 2023-05-01 ENCOUNTER — OFFICE VISIT (OUTPATIENT)
Dept: INTERNAL MEDICINE | Facility: CLINIC | Age: 58
End: 2023-05-01
Payer: MEDICARE

## 2023-05-01 DIAGNOSIS — M48.061 NEURAL FORAMINAL STENOSIS OF LUMBAR SPINE: ICD-10-CM

## 2023-05-01 DIAGNOSIS — M54.41 CHRONIC BILATERAL LOW BACK PAIN WITH BILATERAL SCIATICA: Chronic | ICD-10-CM

## 2023-05-01 DIAGNOSIS — E11.3299 NPDR (NONPROLIFERATIVE DIABETIC RETINOPATHY): Chronic | ICD-10-CM

## 2023-05-01 DIAGNOSIS — G47.33 OSA (OBSTRUCTIVE SLEEP APNEA): Chronic | ICD-10-CM

## 2023-05-01 DIAGNOSIS — M54.16 LUMBAR RADICULOPATHY, CHRONIC: ICD-10-CM

## 2023-05-01 DIAGNOSIS — N18.31 TYPE 2 DIABETES MELLITUS WITH STAGE 3A CHRONIC KIDNEY DISEASE, WITH LONG-TERM CURRENT USE OF INSULIN: Primary | ICD-10-CM

## 2023-05-01 DIAGNOSIS — Z79.4 TYPE 2 DIABETES MELLITUS WITH STAGE 3A CHRONIC KIDNEY DISEASE, WITH LONG-TERM CURRENT USE OF INSULIN: Primary | ICD-10-CM

## 2023-05-01 DIAGNOSIS — M54.42 CHRONIC BILATERAL LOW BACK PAIN WITH BILATERAL SCIATICA: Chronic | ICD-10-CM

## 2023-05-01 DIAGNOSIS — M54.16 LUMBAR RADICULOPATHY: ICD-10-CM

## 2023-05-01 DIAGNOSIS — E11.22 TYPE 2 DIABETES MELLITUS WITH STAGE 3A CHRONIC KIDNEY DISEASE, WITH LONG-TERM CURRENT USE OF INSULIN: Primary | ICD-10-CM

## 2023-05-01 DIAGNOSIS — G89.29 CHRONIC BILATERAL LOW BACK PAIN WITH BILATERAL SCIATICA: Chronic | ICD-10-CM

## 2023-05-01 PROBLEM — E11.40 DIABETIC NEUROPATHY: Chronic | Status: ACTIVE | Noted: 2022-06-22

## 2023-05-01 PROBLEM — I10 PRIMARY HYPERTENSION: Chronic | Status: ACTIVE | Noted: 2022-06-22

## 2023-05-01 PROBLEM — G63 POLYNEUROPATHY ASSOCIATED WITH UNDERLYING DISEASE: Chronic | Status: ACTIVE | Noted: 2023-02-20

## 2023-05-01 PROBLEM — F41.8 MIXED ANXIETY DEPRESSIVE DISORDER: Chronic | Status: ACTIVE | Noted: 2022-06-22

## 2023-05-01 PROBLEM — E78.5 HYPERLIPIDEMIA LDL GOAL <70: Chronic | Status: ACTIVE | Noted: 2022-06-22

## 2023-05-01 PROCEDURE — 99214 PR OFFICE/OUTPT VISIT, EST, LEVL IV, 30-39 MIN: ICD-10-PCS | Mod: 95,,, | Performed by: NURSE PRACTITIONER

## 2023-05-01 PROCEDURE — 99214 OFFICE O/P EST MOD 30 MIN: CPT | Mod: 95,,, | Performed by: NURSE PRACTITIONER

## 2023-05-01 RX ORDER — BACLOFEN 10 MG/1
10 TABLET ORAL 2 TIMES DAILY PRN
Qty: 30 TABLET | Refills: 1 | Status: SHIPPED | OUTPATIENT
Start: 2023-05-01 | End: 2023-07-21 | Stop reason: SDUPTHER

## 2023-05-01 NOTE — PROGRESS NOTES
Audio Only Telehealth Visit     The patient location is: at home  The chief complaint leading to consultation is: DM and back pain  Visit type: Virtual visit with audio only (telephone)  Total time spent with patient: 26 mins     The reason for the audio only service rather than synchronous audio and video virtual visit was related to technical difficulties or patient preference/necessity.     Each patient to whom I provide medical services by telemedicine is:  (1) informed of the relationship between the physician and patient and the respective role of any other health care provider with respect to management of the patient; and (2) notified that they may decline to receive medical services by telemedicine and may withdraw from such care at any time. Patient verbally consented to receive this service via voice-only telephone call.    Billing Provider: ALYX Robison  Level of Service: NV OFFICE/OUTPT VISIT, EST, LEVL IV, 30-39 MIN  Patient PCP Information       Provider PCP Type    ALYX Robison General            Reason for Visit / Chief Complaint: Follow-up (Lab results , c/o prickling in toes, LBP radiating down RLE )       History of Present Illness / Problem Focused Workflow     Gloria Cadena Adeel is a 57 y.o. Black or  female for DM f/u. PMH HTN, dyslipidemia, JORGE ALBERTO on Bipap, DM, DM neuropathy, depression, anxiety, CKD, hyperkalemia, L NPDR, GERD, chronic constipation, morbid obesity, atrophic vaginitis, Yolanda-Yolanda dz, and chronic back pain. Followed by [facility], GYN, renal and GI clinics. Pt has not rescheduled ophtho appt since last visit.     Today, pt reports worsening, chronic LBP with right>left sciatica, which is shooting pain into her toes, with associated numbness. Continues to use cane while ambulating; pain worse at the end of the day. Is out of baclofen. Is willing to attempt PT and pain management if covered by insurance. Reports med compliance for the  most part; is still missing doses here and there. Is attempting ADA diet since 's death. CBGs ranging 100s when checked. Continues to wear Bipap nightly. Labs reviewed with pt. Denies chest pain, shortness of breath, cough, fever, headache, dizziness, weakness, abdominal pain, nausea, vomiting, diarrhea, constipation, dysuria, depression, anxiety, SI/HI.      Review of Systems     Review of Systems   Constitutional:  Negative for fever.   Cardiovascular:  Negative for chest pain.   Gastrointestinal:  Negative for abdominal pain.   Genitourinary:  Negative for bladder incontinence, dysuria, hematuria and pelvic pain.   Musculoskeletal:  Positive for back pain and leg pain.   Neurological:  Positive for numbness. Negative for weakness and headaches.      See HPI for details    Constitutional: Denies Change in appetite. Denies Chills. Denies Fever. Denies Night sweats.  Eye: Denies Blurred vision. Denies Discharge. Denies Eye pain.  ENT: Denies Decreased hearing. Denies Sore throat. Denies Swollen glands.  Respiratory: Denies Cough. Denies Shortness of breath. Denies Shortness of breath with exertion. Denies Wheezing.  Cardiovascular: Denies Chest pain at rest. Denies Chest pain with exertion. Denies Irregular heartbeat. Denies Palpitations. Denies Edema.  Gastrointestinal: Denies Abdominal pain. Denies Diarrhea. Denies Nausea. Denies Vomiting. Denies Hematemesis or Hematochezia.  Genitourinary: Denies Dysuria. Denies Urinary frequency. Denies Urinary urgency. Denies Blood in urine.  Endocrine: Denies Cold intolerance. Denies Excessive thirst. Denies Heat intolerance. Denies Weight loss. Denies Weight gain.  Musculoskeletal: Admits Painful joints. Denies Weakness. Admits BLE numbness  Integumentary: Denies Rash. Denies Itching. Denies Dry skin.  Neurologic: Denies Dizziness. Denies Fainting. Denies Headache.  Psychiatric: Denies Depression. Denies Anxiety. Denies Suicidal/Homicidal ideations.    All Other ROS:  Negative except as stated in HPI.     Medical / Social / Family History     ----------------------------  Anxiety disorder, unspecified  Chronic constipation  CKD (chronic kidney disease)  Depression  DM (diabetes mellitus)  Dyslipidemia  GERD (gastroesophageal reflux disease)  HTN (hypertension)  Morbid obesity  Sleep apnea, unspecified     Past Surgical History:   Procedure Laterality Date    ARTHROSCOPIC REMOVAL OF LOOSE BODY FROM JOINT       SECTION      CHOLECYSTECTOMY      Drainage of oral abscess      Exploration using laparoscope      UPPER GASTROINTESTINAL ENDOSCOPY         Social History     Socioeconomic History    Marital status:      Spouse name: Kp   Tobacco Use    Smoking status: Never    Smokeless tobacco: Never   Substance and Sexual Activity    Alcohol use: Never    Drug use: Never    Sexual activity: Not Currently     Partners: Male     Birth control/protection: Post-menopausal     Social Determinants of Health     Financial Resource Strain: Low Risk     Difficulty of Paying Living Expenses: Not very hard   Food Insecurity: No Food Insecurity    Worried About Running Out of Food in the Last Year: Never true    Ran Out of Food in the Last Year: Never true   Transportation Needs: No Transportation Needs    Lack of Transportation (Medical): No    Lack of Transportation (Non-Medical): No   Physical Activity: Inactive    Days of Exercise per Week: 0 days    Minutes of Exercise per Session: 0 min   Stress: Stress Concern Present    Feeling of Stress : To some extent   Social Connections: Moderately Isolated    Frequency of Communication with Friends and Family: More than three times a week    Frequency of Social Gatherings with Friends and Family: More than three times a week    Attends [employer] Services: More than 4 times per year    Active Member of Clubs or Organizations: No    Attends Club or Organization Meetings: Never    Marital Status:    Housing Stability: Low Risk      "Unable to Pay for Housing in the Last Year: No    Number of Places Lived in the Last Year: 1    Unstable Housing in the Last Year: No        Family History   Problem Relation Age of Onset    Cancer Mother     Heart disease Mother     Diabetes Father     Heart attack Father     Kidney failure Father     Stroke Father     Cancer Brother         Medications and Allergies     Medications  Current Outpatient Medications   Medication Instructions    atorvastatin (LIPITOR) 40 mg, Oral, Daily    baclofen (LIORESAL) 10 mg, Oral, 2 times daily PRN    BD CAYLA 2ND GEN PEN NEEDLE 32 gauge x 5/32" Ndle SMARTSIG:Injection Every Night    betamethasone dipropionate 0.05 % cream   See Instructions, APPLY  CREAM TOPICALLY TWICE DAILY, # 60 gm, 0 Refill(s), Pharmacy: [id number], 155, cm, Height/Length Dosing, 21 8:39:00 CDT, 112, kg, Weight Dosing, 21 11:11:00 CDT    blood sugar diagnostic Strp To check BG 2 times daily, to use with insurance preferred meter    blood-glucose meter kit To check BG 2 times daily, to use with insurance preferred meter    conjugated estrogens (PREMARIN) vaginal cream   See Instructions, Apply dime size amout to vaginal wall with a gloved finger x1 week then use 2x/week going forward. Do not use with intecourse., # 30 gm, 2 Refill(s), Pharmacy: [facility], 155, cm, Height/Length Dosing, 21...    DULoxetine (CYMBALTA) 60 mg, Oral, Nightly    empagliflozin (JARDIANCE) 10 mg, Oral, Daily    enalapril (VASOTEC) 2.5 mg, Oral, Daily    fluconazole (DIFLUCAN) 200 mg, Oral, Every other day    hydrOXYzine pamoate (VISTARIL) 25 MG Cap TAKE 1 CAPSULE BY MOUTH THREE TIMES DAILY AS NEEDED FOR ANXIETY    ketoconazole (NIZORAL) 2 % cream SMARTSIG:Sparingly Topical Twice Daily    lancets Misc To check BG 2 times daily, to use with insurance preferred meter    LEVEMIR FLEXPEN 100 unit/mL (3 mL) InPn pen SMARTSI Unit(s) SUB-Q Every Evening    LEVEMIR U-100 INSULIN 30 " Units, Subcutaneous, Nightly    LINZESS 290 mcg, Oral, Daily    LOKELMA 10 gram packet SMARTSI Packet(s) By Mouth 3 Times a Week    meloxicam (MOBIC) 7.5 mg, Oral, Daily PRN    metoprolol tartrate (LOPRESSOR) 25 mg, Oral, 2 times daily    nystatin (MYCOSTATIN) powder SMARTSI Gram(s) Topical Twice Daily    omeprazole (PRILOSEC) 40 mg, Oral, Daily    SITagliptan-metformin (JANUMET) 50-1,000 mg per tablet 1 tablet, Oral, 2 times daily         Allergies  Review of patient's allergies indicates:   Allergen Reactions    Aspirin        Physical Examination   Vital Signs  Pain Score:   6  Pain Loc: Back (Lower)]    Physical Exam  Constitutional:       Appearance: She is morbidly obese.   Neurological:      Mental Status: She is alert and oriented to person, place, and time.   Psychiatric:         Mood and Affect: Mood normal.         Speech: Speech normal.         Behavior: Behavior normal. Behavior is cooperative.         Thought Content: Thought content normal.         Judgment: Judgment normal.       Results     Lab Results   Component Value Date    WBC 6.6 2023    HGB 11.8 (L) 2023    HCT 37.9 2023     2023    CHOL 148 01/10/2023    TRIG 104 01/10/2023    ALT 19 2023    AST 23 2023     2023    K 5.2 (H) 2023    CREATININE 1.55 (H) 2023    BUN 32.2 (H) 2023    CO2 19 (L) 2023    TSH 1.073 01/10/2023    HGBA1C 7.9 (H) 2023     Details    Reading Physician Reading Date Result Priority   IN REPORT, PROVIDER 2016      Narrative & Impression  Multiplanar, multisequence MR images of the lumbar spine were obtained  without the intravenous administration of gadolinium-based contrast  media.  Images were acquired on a 3T magnet.     INDICATION: Chronic low back pain     COMPARISON: None available     FINDINGS:  The lumbar spine demonstrates normal alignment.  Vertebral body heights are normal.  The bone marrow signal pattern is  normal.  There is decreased hydration of the T11-T12 and L4-L5 intervertebral  discs with mild disc space loss. Intervertebral discs are otherwise  normal in height and signal intensity.  The conus medullaris terminates at the level of L1 and the nerve roots  of the cauda equina appear normal.  Included perispinal soft tissues and retroperitoneal structures are  unremarkable, although evaluation is limited by exam protocol.     Evaluation of the individual levels demonstrates:   T12-L1: Unremarkable.  L1-2: Unremarkable.  L2-3: Unremarkable.  L3-4: Unremarkable.  L4-5: Broad-based disc bulging is present with left-greater-than-right  lateral recess encroachment. There is mild right neuroforaminal  narrowing. Associated facet hypertrophy is present.  L5-S1: Unremarkable.     IMPRESSION:   L4-L5 spondylosis with mild lateral recess encroachment and rightward  neuroforaminal narrowing.  Mild at T11-T12 degenerative change.      Electronically Signed By: French Anaya MD  Date/Time Signed: 01/29/2016 13:49    Assessment and Plan (including Health Maintenance)     Plan:     1. Type 2 diabetes mellitus with stage 3a chronic kidney disease, with long-term current use of insulin  A1C 7.9 at goal. Previous A1C 9.0.   Continue janumet, jardiance and levemir.  Declines med adjustments today.   Follow ADA diet.  Avoid soda, simple sweets, and limit rice/pasta/bread/starches and consume brown options when possible.   Maintain healthy weight with BMI goal <30.   Perform aerobic exercise for 150 minutes per week (or 5 days a week for 30 minutes each day).   Examine feet daily.   Obtain annual dilated eye exam.  Eye exam: 4/12/22 (at next visit)  Foot exam: 4/12/22 (at next visit)      2. Chronic bilateral low back pain with bilateral sciatica  Refilled baclofen prn.  Repeat MRI ordered d/t worsening weakness and pain.  Referral to PT and [facility] to eval/treat.   Perform back stretches daily.   Avoid  activities than increase back pain or stiffness.   Apply heating pad, ice pack, and BioFreeze as needed; alternate every 15-20 minutes.   Massage back to loosen muscles.   Continue tylenol arthritis/muscle relaxer as needed.      3. NPDR (nonproliferative diabetic retinopathy)  F/U with optho for visit.   Control blood sugars.     4. BMI 40.0-44.9, adult  BMI 43. Goal BMI <30.  Aerobic exercise 150 minutes per week.  Avoid soda, simple sugars, sweets, excessive rice, pasta, potatoes or bread.   Choose brown options when available and portion control.  Limit fast foods and fried foods.   Choose complex carbs in moderation (ex: green, leafy vegetables, beans, oatmeal).  Eat plenty of fresh fruits and vegetables with lean meats daily.   Consider permanent healthy lifestyle changes.      5. JORGE ALBERTO (obstructive sleep apnea)  Reports compliance with wearing BiPAP nightly.  Reports decreased EDS, snoring and fatigue.  Pt is benefiting from its use.  Expect lifetime use and decreased daytime sleepiness/fatigue.   Avoid excessive alcohol, smoking and overuse of sedatives at bedtime.  Weight management discussed.  Adjust sleep position as needed for increased comfort.        Problem List Items Addressed This Visit          Ophtho    NPDR (nonproliferative diabetic retinopathy) (Chronic)       Endocrine    BMI 40.0-44.9, adult (Chronic)    Type 2 diabetes mellitus with stage 3a chronic kidney disease, with long-term current use of insulin - Primary (Chronic)    Relevant Orders    Basic Metabolic Panel    Hemoglobin A1C       Orthopedic    Chronic bilateral low back pain with bilateral sciatica (Chronic)    Relevant Orders    Ambulatory referral/consult to Pain Clinic    Ambulatory referral/consult to Physical/Occupational Therapy       Other    JORGE ALBERTO (obstructive sleep apnea) (Chronic)         Health Maintenance Due   Topic Date Due    Pneumococcal Vaccines (Age 0-64) (1 - PCV) Never done    Shingles Vaccine (1 of 2) Never done     COVID-19 Vaccine (4 - Booster for Pfizer series) 12/15/2021    Colorectal Cancer Screening  04/17/2022    Diabetes Urine Screening  10/11/2022    Foot Exam  04/14/2023    Eye Exam  04/14/2023    Mammogram  05/16/2023       Follow up in about 3 months (around 8/1/2023) for Follow up with labs one week prior to appt. .        Signature:  ALYX Robison  [facility]  [address] [facility] [id number]      Date of encounter: 5/1/23              This service was not originating from a related E/M service provided within the previous 7 days nor will  to an E/M service or procedure within the next 24 hours or my soonest available appointment.  Prevailing standard of care was able to be met in this audio-only visit.     Answers submitted by the patient for this visit:  Back Pain Questionnaire (Submitted on 5/1/2023)  Chief Complaint: Back pain  Chronicity: chronic  Onset: more than 1 year ago  Frequency: 2 to 4 times per day  Progression since onset: gradually worsening  Pain location: gluteal, sacro-iliac  Pain quality: stabbing  Radiates to: left foot, right foot, right thigh  Pain - numeric: 8/10  Pain is: worse during the night  Aggravated by: position, twisting  Stiffness is present: at night  bowel incontinence: No  paresis: No  paresthesias: Yes  perianal numbness: No  tingling: No  weight loss: No  genital pain: Yes  Pain severity: moderate  Improvement on treatment: mild

## 2023-05-10 ENCOUNTER — OFFICE VISIT (OUTPATIENT)
Dept: NEPHROLOGY | Facility: CLINIC | Age: 58
End: 2023-05-10
Payer: MEDICARE

## 2023-05-10 VITALS
WEIGHT: 221.19 LBS | RESPIRATION RATE: 18 BRPM | HEIGHT: 60 IN | HEART RATE: 69 BPM | DIASTOLIC BLOOD PRESSURE: 70 MMHG | SYSTOLIC BLOOD PRESSURE: 103 MMHG | BODY MASS INDEX: 43.43 KG/M2 | OXYGEN SATURATION: 100 % | TEMPERATURE: 98 F

## 2023-05-10 DIAGNOSIS — I10 PRIMARY HYPERTENSION: Chronic | ICD-10-CM

## 2023-05-10 DIAGNOSIS — E87.5 HYPERKALEMIA: ICD-10-CM

## 2023-05-10 DIAGNOSIS — N18.32 CKD STAGE G3B/A2, GFR 30-44 AND ALBUMIN CREATININE RATIO 30-299 MG/G: Primary | ICD-10-CM

## 2023-05-10 DIAGNOSIS — E87.20 METABOLIC ACIDOSIS: ICD-10-CM

## 2023-05-10 PROCEDURE — 99215 OFFICE O/P EST HI 40 MIN: CPT | Mod: PBBFAC | Performed by: NURSE PRACTITIONER

## 2023-05-10 PROCEDURE — 99214 OFFICE O/P EST MOD 30 MIN: CPT | Mod: S$PBB,,, | Performed by: NURSE PRACTITIONER

## 2023-05-10 PROCEDURE — 99214 PR OFFICE/OUTPT VISIT, EST, LEVL IV, 30-39 MIN: ICD-10-PCS | Mod: S$PBB,,, | Performed by: NURSE PRACTITIONER

## 2023-05-10 RX ORDER — SODIUM BICARBONATE 650 MG/1
1300 TABLET ORAL DAILY
Qty: 180 TABLET | Refills: 3 | Status: SHIPPED | OUTPATIENT
Start: 2023-05-10 | End: 2023-08-08 | Stop reason: ALTCHOICE

## 2023-05-10 NOTE — PROGRESS NOTES
"  [facility]  Nephrology Clinic Note    Chief complaint: Chronic Kidney Disease (RTC, did not take meds, feet have beed 'stinging')    History of present illness:   Gloria Denis is a 57 y.o. Black or  female with past medical history of  chronic kidney disease, persistent hyperkalemia, diabetes mellitus type 2 (diagnosed in her late 30s), hypertension, dyslipidemia, sleep apnea, anxiety, depression, chronic back pain, GERD, chronic constipation and morbid obesity. Patient presents for follow-up appointment in nephrology clinic.    Review of Systems  12 point review of systems conducted, negative except as stated in the history of present illness.    Allergies: Patient is allergic to aspirin.     Past Medical History:  has a past medical history of Anxiety disorder, unspecified, Chronic constipation, CKD (chronic kidney disease), Depression, DM (diabetes mellitus), Dyslipidemia, GERD (gastroesophageal reflux disease), HTN (hypertension), Morbid obesity, and Sleep apnea, unspecified.    Procedure History:  has a past surgical history that includes  section; Cholecystectomy; Arthroscopic removal of loose body from joint; Drainage of oral abscess; Exploration using laparoscope; and Upper gastrointestinal endoscopy.    Family History: family history includes Cancer in her brother and mother; Diabetes in her father; Heart attack in her father; Heart disease in her mother; Kidney failure in her father; Stroke in her father.    Social History:  reports that she has never smoked. She has never used smokeless tobacco. She reports that she does not drink alcohol and does not use drugs.    Physical exam  /70 (BP Location: Right arm, Patient Position: Sitting, BP Method: Large (Automatic))   Pulse 69   Temp 98 °F (36.7 °C) (Oral)   Resp 18   Ht 4' 11.84" (1.52 m)   Wt 100.3 kg (221 lb 3.2 oz)   SpO2 100%   BMI 43.43 kg/m²   General appearance: Patient " "is in no acute distress.  Skin: No rashes or wounds.  HEENT: PERRLA, EOMI, no scleral icterus, no JVD. Neck is supple.  Chest: Respirations are unlabored. Lungs sounds are clear.   Heart: S1, S2.   Abdomen: Benign.  : Deferred.  Extremities: No edema, peripheral pulses are palpable.   Neuro: No focal deficits.     Home Medications:    Current Outpatient Medications:     atorvastatin (LIPITOR) 40 MG tablet, Take 1 tablet (40 mg total) by mouth once daily., Disp: 90 tablet, Rfl: 3    baclofen (LIORESAL) 10 MG tablet, Take 1 tablet (10 mg total) by mouth 2 (two) times daily as needed (muscle spasm or pain)., Disp: 30 tablet, Rfl: 1    BD CAYLA 2ND GEN PEN NEEDLE 32 gauge x 5/32" Ndle, SMARTSIG:Injection Every Night, Disp: 100 each, Rfl: 2    betamethasone dipropionate 0.05 % cream,  See Instructions, APPLY  CREAM TOPICALLY TWICE DAILY, # 60 gm, 0 Refill(s), Pharmacy: [id number], 155, cm, Height/Length Dosing, 07/08/21 8:39:00 CDT, 112, kg, Weight Dosing, 07/08/21 11:11:00 CDT, Disp: , Rfl:     blood sugar diagnostic Strp, To check BG 2 times daily, to use with insurance preferred meter, Disp: 100 each, Rfl: 3    blood-glucose meter kit, To check BG 2 times daily, to use with insurance preferred meter, Disp: 1 each, Rfl: 0    DULoxetine (CYMBALTA) 60 MG capsule, Take 1 capsule (60 mg total) by mouth every evening., Disp: 90 capsule, Rfl: 3    empagliflozin (JARDIANCE) 10 mg tablet, Take 1 tablet (10 mg total) by mouth once daily., Disp: 90 tablet, Rfl: 1    enalapril (VASOTEC) 2.5 MG tablet, Take 1 tablet (2.5 mg total) by mouth once daily., Disp: 90 tablet, Rfl: 1    hydrOXYzine pamoate (VISTARIL) 25 MG Cap, TAKE 1 CAPSULE BY MOUTH THREE TIMES DAILY AS NEEDED FOR ANXIETY, Disp: 90 capsule, Rfl: 0    ketoconazole (NIZORAL) 2 % cream, SMARTSIG:Sparingly Topical Twice Daily, Disp: , Rfl:     lancets Mis, To check BG 2 times daily, to use with insurance preferred meter, Disp: 100 each, Rfl: 3    LEVEMIR FLEXPEN " 100 unit/mL (3 mL) InPn pen, SMARTSI Unit(s) SUB-Q Every Evening, Disp: , Rfl:     LINZESS 290 mcg Cap capsule, Take 1 capsule (290 mcg total) by mouth once daily., Disp: 90 capsule, Rfl: 3    LOKELMA 10 gram packet, SMARTSI Packet(s) By Mouth 3 Times a Week, Disp: 45 packet, Rfl: 0    metoprolol tartrate (LOPRESSOR) 25 MG tablet, Take 1 tablet (25 mg total) by mouth 2 (two) times daily., Disp: 180 tablet, Rfl: 3    nystatin (MYCOSTATIN) powder, SMARTSI Gram(s) Topical Twice Daily, Disp: , Rfl:     SITagliptan-metformin (JANUMET) 50-1,000 mg per tablet, Take 1 tablet by mouth 2 (two) times a day., Disp: 180 tablet, Rfl: 1    fluconazole (DIFLUCAN) 200 MG Tab, Take 200 mg by mouth every other day., Disp: , Rfl:     insulin detemir U-100 (LEVEMIR U-100 INSULIN) 100 unit/mL injection, Inject 30 Units into the skin every evening. (Patient not taking: Reported on 5/10/2023), Disp: 30 mL, Rfl: 3    meloxicam (MOBIC) 7.5 MG tablet, Take 1 tablet (7.5 mg total) by mouth daily as needed for Pain. (Patient not taking: Reported on 5/10/2023), Disp: 10 tablet, Rfl: 0    omeprazole (PRILOSEC) 40 MG capsule, Take 1 capsule (40 mg total) by mouth once daily., Disp: 30 capsule, Rfl: 5    sodium bicarbonate 650 MG tablet, Take 2 tablets (1,300 mg total) by mouth once daily., Disp: 180 tablet, Rfl: 3    Laboratory data    Serum  Lab Results   Component Value Date    WBC 6.6 2023    HGB 11.8 (L) 2023    HCT 37.9 2023     2023     2023    K 5.2 (H) 2023    CHLORIDE 109 (H) 2023    CO2 19 (L) 2023    BUN 32.2 (H) 2023    CREATININE 1.55 (H) 2023    EGFRNORACEVR 39 2023    GLUCOSE 159 (H) 2023    CALCIUM 10.1 2023    ALKPHOS 89 2023    LABPROT 8.0 2023    ALBUMIN 3.8 2023    BILIDIR 0.2 2022    IBILI 0.30 2022    AST 23 2023    ALT 19 2023      Lab Results   Component Value Date    HGBA1C 7.9 (H)  04/28/2023    HIV Nonreactive 05/17/2017    HEPCAB Reactive (A) 05/17/2017    HEPBSURFAG Negative 05/17/2017     Urine:  Lab Results   Component Value Date    COLORUA Yellow 04/28/2023    APPEARANCEUA Turbid (A) 04/28/2023    SGUA 1.024 04/28/2023    PHUA 5.0 04/28/2023    PROTEINUA 1+ (A) 04/28/2023    GLUCOSEUA 4+ (A) 04/28/2023    KETONESUA Negative 04/28/2023    BLOODUA Negative 04/28/2023    NITRITESUA Negative 04/28/2023    LEUKOCYTESUR 500 (A) 04/28/2023    RBCUA 0-5 04/28/2023    WBCUA 6-10 (A) 04/28/2023    BACTERIA Trace (A) 04/28/2023    SQEPUA Few (A) 04/28/2023    HYALINECASTS 0-2 (A) 04/28/2023    CREATRANDUR 235.9 (H) 04/28/2023    PROTEINURINE 34.3 04/28/2023    UPROTCREA 0.1 04/28/2023         Imaging  US Retroperitoneal Limited 05/17/2021    Narrative  TECHNIQUE: Gray-scale and color Doppler images of the abdomen    HISTORY: Other (please specify)    COMPARISON: CT lumbar spine 06/12/2020    FINDINGS: The bilateral kidneys demonstrate parenchymal thinning.    The right kidney measures 10.5 x 4.1 x 4.3 cm and is otherwise  unremarkable. There is no right hydronephrosis.    The left kidney measures 10.5 x 4.2 x 5.2 cm and is otherwise  unremarkable. There is no left hydronephrosis.      IMPRESSION:    1. Bilateral medical renal disease.    Electronically Signed By: Shai Ga MD  Date/Time Signed: 05/17/2021 14:05      Impression and plan     CKD stage G3b/A2, GFR 30-44 and albumin creatinine ratio  mg/g  -     Comprehensive Metabolic Panel; Future; Expected date: 09/01/2023  -     Phosphorus; Future; Expected date: 09/01/2023  -     PTH, Intact; Future; Expected date: 09/01/2023  -     Protein/Creatinine Ratio, Urine; Future; Expected date: 09/01/2023  -     Urinalysis, Reflex to Urine Culture; Future; Expected date: 09/01/2023  Possibly diabetic kidney disease.  Serum creatinine is stable, there is no significant proteinuria, imaging of the kidneys was essentially unremarkable. Continue  risk factor management. Continue enalapril with close monitoring of serum potassium. Importance of taking all medications exactly as prescribed, especially Lokelma, discussed with the patient at length. Continue SGLT2 inhibitor therapy as well.  Continue renal sparing activities:    -Follow low sodium diet (2 grams a day)  -Control diabetes (goal A1C <7.0%)  -Control high blood pressure ( goal BP < 130/80, please record BP at home every day and bring log to next office visit)  -Exercise at least 30 minutes a day, 5 days a week.  -Maintain healthy weight.  -Decrease or stop alcohol use  -Do not smoke  -Stay well hydrated  -Receive Pneumovax, Flu, and HBV vaccines if indicated.  -Do not take NSAIDs (Ibuprofen, Naproxen, Aleve, Advil, Toradol, Mobic), may take only Tylenol as needed for pain/headaches.  -Take cholesterol-lowering medications as prescribed (LDL goal <100)    Handout on treatment options for kidney disease provided.  F/U with PCP as scheduled  [facility] with routine labs in 4 months    Metabolic acidosis  -     sodium bicarbonate 650 MG tablet; Take 2 tablets (1,300 mg total) by mouth once daily.  Dispense: 180 tablet; Refill: 3  Will resume sodium bicarbonate supplementation.    Primary hypertension  Blood pressure reading is at goal, continue current antihypertensive regimen and 2 g a day dietary sodium restriction.      Hyperkalemia  Continue Lokelma as prescribed.      BMI 40.0-44.9, adult  Lifestyle and dietary interventions discussed, patient counseled on weight loss using portion control, non- sedentary lifestyle, low-carbohydrate/low fat diet.          5/10/2023  Charlene Pineda NP  [facility]

## 2023-06-06 ENCOUNTER — TELEPHONE (OUTPATIENT)
Dept: INTERNAL MEDICINE | Facility: CLINIC | Age: 58
End: 2023-06-06
Payer: MEDICARE

## 2023-06-06 NOTE — TELEPHONE ENCOUNTER
----- Message from Maegan Crawford sent at 6/6/2023  9:14 AM CDT -----  Regarding: referral for pain management  Referral that was sent for pain management requires a MRI report for what the patient is being referred for.  I did look and the patient has not done a MRI of the back.  Please advise

## 2023-06-19 ENCOUNTER — PATIENT MESSAGE (OUTPATIENT)
Dept: ADMINISTRATIVE | Facility: HOSPITAL | Age: 58
End: 2023-06-19
Payer: MEDICARE

## 2023-06-28 ENCOUNTER — TELEPHONE (OUTPATIENT)
Dept: INTERNAL MEDICINE | Facility: CLINIC | Age: 58
End: 2023-06-28
Payer: MEDICARE

## 2023-06-28 NOTE — TELEPHONE ENCOUNTER
Ask the pt if she would like to try another muscle relaxer or some volatren gel. I do not have many other options for her pain unless she desires a referral to pain management. Let me know her response. Thanks

## 2023-06-28 NOTE — TELEPHONE ENCOUNTER
Patient called in stating she feels like the baclofen isn't working says it doesn't help with the pain but just makes her drowsy. Please advise.

## 2023-06-29 RX ORDER — DICLOFENAC SODIUM 10 MG/G
2 GEL TOPICAL 2 TIMES DAILY PRN
Qty: 100 G | Refills: 3 | Status: SHIPPED | OUTPATIENT
Start: 2023-06-29 | End: 2023-10-13 | Stop reason: SDUPTHER

## 2023-06-29 NOTE — TELEPHONE ENCOUNTER
Contacted patient back and she asked if you would be able to prescribe ibuprofen and if not you can do the gel.

## 2023-06-29 NOTE — TELEPHONE ENCOUNTER
Pt's has decreased kidney function and should use NSAIDs sparingly. She should take tylenol arthritis for pain/headaches. I have sent rx for diclofenac gel. Thanks

## 2023-07-10 ENCOUNTER — TELEPHONE (OUTPATIENT)
Dept: GASTROENTEROLOGY | Facility: CLINIC | Age: 58
End: 2023-07-10
Payer: MEDICARE

## 2023-07-10 DIAGNOSIS — K59.00 CONSTIPATION, UNSPECIFIED CONSTIPATION TYPE: ICD-10-CM

## 2023-07-10 RX ORDER — LINACLOTIDE 290 UG/1
CAPSULE, GELATIN COATED ORAL
Qty: 90 CAPSULE | Refills: 0 | Status: SHIPPED | OUTPATIENT
Start: 2023-07-10 | End: 2023-07-17 | Stop reason: SDUPTHER

## 2023-07-10 NOTE — TELEPHONE ENCOUNTER
Spoke with pt. Advised that she does not need any labs prior to her GI appt. Verbalized understanding

## 2023-07-10 NOTE — TELEPHONE ENCOUNTER
----- Message from Marilee Montanez sent at 7/10/2023 10:26 AM CDT -----  Pt calling to find out if labs is needing before appt. There is no order but pt states that she normally does labs before appt. Please advise  Pt # [phone]

## 2023-07-17 ENCOUNTER — OFFICE VISIT (OUTPATIENT)
Dept: GASTROENTEROLOGY | Facility: CLINIC | Age: 58
End: 2023-07-17
Payer: MEDICARE

## 2023-07-17 VITALS
BODY MASS INDEX: 43.71 KG/M2 | OXYGEN SATURATION: 99 % | SYSTOLIC BLOOD PRESSURE: 103 MMHG | HEART RATE: 63 BPM | WEIGHT: 222.63 LBS | RESPIRATION RATE: 16 BRPM | HEIGHT: 60 IN | DIASTOLIC BLOOD PRESSURE: 72 MMHG | TEMPERATURE: 98 F

## 2023-07-17 DIAGNOSIS — K59.09 CHRONIC CONSTIPATION: ICD-10-CM

## 2023-07-17 DIAGNOSIS — K21.9 GASTROESOPHAGEAL REFLUX DISEASE, UNSPECIFIED WHETHER ESOPHAGITIS PRESENT: Primary | ICD-10-CM

## 2023-07-17 DIAGNOSIS — Z12.11 SCREENING FOR COLON CANCER: ICD-10-CM

## 2023-07-17 PROCEDURE — 4010F PR ACE/ARB THEARPY RXD/TAKEN: ICD-10-PCS | Mod: CPTII,,, | Performed by: NURSE PRACTITIONER

## 2023-07-17 PROCEDURE — 3008F BODY MASS INDEX DOCD: CPT | Mod: CPTII,,, | Performed by: NURSE PRACTITIONER

## 2023-07-17 PROCEDURE — 3008F PR BODY MASS INDEX (BMI) DOCUMENTED: ICD-10-PCS | Mod: CPTII,,, | Performed by: NURSE PRACTITIONER

## 2023-07-17 PROCEDURE — 3066F NEPHROPATHY DOC TX: CPT | Mod: CPTII,,, | Performed by: NURSE PRACTITIONER

## 2023-07-17 PROCEDURE — 1160F RVW MEDS BY RX/DR IN RCRD: CPT | Mod: CPTII,,, | Performed by: NURSE PRACTITIONER

## 2023-07-17 PROCEDURE — 3051F HG A1C>EQUAL 7.0%<8.0%: CPT | Mod: CPTII,,, | Performed by: NURSE PRACTITIONER

## 2023-07-17 PROCEDURE — 3051F PR MOST RECENT HEMOGLOBIN A1C LEVEL 7.0 - < 8.0%: ICD-10-PCS | Mod: CPTII,,, | Performed by: NURSE PRACTITIONER

## 2023-07-17 PROCEDURE — 99214 PR OFFICE/OUTPT VISIT, EST, LEVL IV, 30-39 MIN: ICD-10-PCS | Mod: S$PBB,,, | Performed by: NURSE PRACTITIONER

## 2023-07-17 PROCEDURE — 3074F PR MOST RECENT SYSTOLIC BLOOD PRESSURE < 130 MM HG: ICD-10-PCS | Mod: CPTII,,, | Performed by: NURSE PRACTITIONER

## 2023-07-17 PROCEDURE — 3074F SYST BP LT 130 MM HG: CPT | Mod: CPTII,,, | Performed by: NURSE PRACTITIONER

## 2023-07-17 PROCEDURE — 99215 OFFICE O/P EST HI 40 MIN: CPT | Mod: PBBFAC | Performed by: NURSE PRACTITIONER

## 2023-07-17 PROCEDURE — 3066F PR DOCUMENTATION OF TREATMENT FOR NEPHROPATHY: ICD-10-PCS | Mod: CPTII,,, | Performed by: NURSE PRACTITIONER

## 2023-07-17 PROCEDURE — 99214 OFFICE O/P EST MOD 30 MIN: CPT | Mod: S$PBB,,, | Performed by: NURSE PRACTITIONER

## 2023-07-17 PROCEDURE — 3078F PR MOST RECENT DIASTOLIC BLOOD PRESSURE < 80 MM HG: ICD-10-PCS | Mod: CPTII,,, | Performed by: NURSE PRACTITIONER

## 2023-07-17 PROCEDURE — 4010F ACE/ARB THERAPY RXD/TAKEN: CPT | Mod: CPTII,,, | Performed by: NURSE PRACTITIONER

## 2023-07-17 PROCEDURE — 1160F PR REVIEW ALL MEDS BY PRESCRIBER/CLIN PHARMACIST DOCUMENTED: ICD-10-PCS | Mod: CPTII,,, | Performed by: NURSE PRACTITIONER

## 2023-07-17 PROCEDURE — 1159F PR MEDICATION LIST DOCUMENTED IN MEDICAL RECORD: ICD-10-PCS | Mod: CPTII,,, | Performed by: NURSE PRACTITIONER

## 2023-07-17 PROCEDURE — 1159F MED LIST DOCD IN RCRD: CPT | Mod: CPTII,,, | Performed by: NURSE PRACTITIONER

## 2023-07-17 PROCEDURE — 3078F DIAST BP <80 MM HG: CPT | Mod: CPTII,,, | Performed by: NURSE PRACTITIONER

## 2023-07-17 RX ORDER — OMEPRAZOLE 40 MG/1
40 CAPSULE, DELAYED RELEASE ORAL DAILY
Qty: 30 CAPSULE | Refills: 5 | Status: SHIPPED | OUTPATIENT
Start: 2023-07-17

## 2023-07-17 RX ORDER — LINACLOTIDE 290 UG/1
290 CAPSULE, GELATIN COATED ORAL DAILY
Qty: 90 CAPSULE | Refills: 3 | Status: SHIPPED | OUTPATIENT
Start: 2023-07-17

## 2023-07-17 NOTE — ASSESSMENT & PLAN NOTE
Recommend soluble fiber supplementation  Avoid straining or sitting on the toilet for long periods of time  Continue Linzess 290 mcg daily (refilled)  Okay to add MiraLAX 17 g daily as needed  Discussed starting Motegrity if Linzess becomes ineffective in controlling symptoms  Call with updates  Follow-up clinic visit with NP in 6 months

## 2023-07-17 NOTE — ASSESSMENT & PLAN NOTE
GERD lifestyle modifications  Reflux precautions  Avoid NSAID use  Continue omeprazole 40 mg daily (refilled)  Will consider EGD with persistent or worsening symptoms  Call with updates  Follow-up clinic visit with NP in 6 months

## 2023-07-17 NOTE — PROGRESS NOTES
Subjective:       Patient ID: Gloria Denis is a 57 y.o. female.    Chief Complaint: Gastroesophageal Reflux    This 57-year-old  female with a history of cholecystectomy, anxiety, depression, chronic back pain, diabetes mellitus, hypertension, dyslipidemia, obesity, and JORGE ALBERTO presents unaccompanied for a follow-up visit.  She was initially evaluated via telemedicine visit October 8, 2020, referred for chronic constipation at that time.  She reported a longstanding history of chronic constipation and going up to 1 week without a bowel movement.  She had frequent straining and had to sit on the toilet for long periods of time in order to have a bowel movement.  She had occasional nausea without vomiting and lower abdominal cramping that was typically relieved with defecation.  She had tried lactulose, MiraLAX, and Colace with minimal relief noted.  She was unable to tolerate lactulose and MiraLAX, which caused her nausea.  She took OTC Dulcolax on an as-needed basis with some relief noted.  She tried samples of Linzess in the past with good relief noted.  Her appetite was good and her weight was stable.  She denied nocturnal symptoms, fever, chills, odynophagia, dysphagia, acid reflux, heartburn, early satiety, hematochezia, melena, fecal urgency, fecal incontinence, or pain with defecation.  She was recommended to stop Dulcolax and start Linzess 145 mcg daily, as well as provided lifestyle and dietary modifications.     Laboratory results October 20, 2020 revealed potassium 5.2, glucose 225, BUN 29, GFR 55, globulin 4.70, and otherwise unremarkable CMP; cholesterol 226, triglycerides 255, , VLDL 51, and otherwise unremarkable lipid panel; TSH 1.647; hemoglobin 11.7, and otherwise unremarkable CBC.  Laboratory results December 24, 2020 revealed potassium 5.7, glucose 277, BUN 32.0, creatinine 1.47, GFR 47, globulin 4.2, and otherwise unremarkable CMP; hemoglobin A1c 9.7%; cholesterol  231, triglycerides 198, , and otherwise unremarkable lipid panel.     She was seen for a follow-up visit January 20, 2021.  She continued to take Linzess 145 mcg daily.  She continued with occasional constipation and did not always feel completely evacuated.  Her appetite was good and her weight was stable.  She denied nocturnal symptoms, fever, chills, nausea, vomiting, odynophagia, dysphagia, acid reflux, heartburn, early satiety, or abdominal pain.  She had 1-2 formed to soft stools daily without melena, hematochezia, fecal urgency, fecal incontinence, or pain with defecation.  Her Linzess dosage was increased to 290 mcg daily.     She was evaluated for a follow-up visit via telemedicine visit July 21, 2021.  She continued to take Linzess 290 mcg daily.  She felt as though the medication helped her symptoms but would occasionally feel incompletely evacuated with defecation on some days.  She had 1 formed stool daily.  She denied straining or sitting on the toilet for long periods of time.  She denied melena, hematochezia, fecal urgency, fecal incontinence, or pain with defecation.  Her appetite was good and her weight was stable.  She denied fever, chills, nausea, vomiting, odynophagia, dysphagia, early satiety, or abdominal pain.     She was evaluated for a follow-up visit January 27, 2022.  She continued to take Linzess 290 mcg daily and reported having one bowel movement daily with good relief noted.  She denied abdominal pain, melena, hematochezia, fecal urgency, fecal incontinence, or pain with defecation.  She reported intermittent acid reflux described as burning up into the back of her throat.  She was taking Tums as needed with some relief noted.  She would awaken in the middle of the night with reflux and pyrosis a few times per week.  She denied fever, chills, nausea, vomiting, odynophagia, dysphagia, or early satiety.     She was seen for a follow-up visit July 7, 2022 and reported feeling well  since her last office visit.  She continued to take Linzess 290 mcg daily and omeprazole 40 mg daily and denied any problems at that time.     She was last seen for a follow-up visit 2023.  She reported continuing to take omeprazole 40 mg as needed and Linzess 290 mcg daily.  She continued to feel well without any problems from previous office visit.  She reported that her  passed away 2023.    Today, she presents for a follow-up visit.  She reports continuing to feel well without any problems noted since last office visit.  She continues Linzess 290 mcg daily and omeprazole 40 mg daily.     Stool for occult blood was negative 2022.  She denies ever having a colonoscopy done.  She had an EGD several years ago but she is unsure of the findings of the procedure.  She takes OTC Ibuprofen as needed for back pain and denies use of blood thinners.  She denies tobacco or alcohol use.  Her sister has a history of colon polyps.    Review of patient's allergies indicates:   Allergen Reactions    Aspirin      Past Medical History:   Diagnosis Date    Anxiety disorder, unspecified     Chronic constipation     CKD (chronic kidney disease)     Depression     DM (diabetes mellitus)     Dyslipidemia     GERD (gastroesophageal reflux disease)     HTN (hypertension)     Irritable bowel syndrome     Morbid obesity     Sleep apnea, unspecified      Past Surgical History:   Procedure Laterality Date    ABSCESS DRAINAGE      I don't remember the date    ARTHROSCOPIC REMOVAL OF LOOSE BODY FROM JOINT       SECTION      CHOLECYSTECTOMY      Drainage of oral abscess      Exploration using laparoscope      UPPER GASTROINTESTINAL ENDOSCOPY       Family History:   family history includes Cancer in her brother and mother; Diabetes in her father; Heart attack in her father; Heart disease in her mother; Kidney failure in her father; Rheum arthritis in her mother; Stroke in her father.    Social  "History:    reports that she has never smoked. She has never used smokeless tobacco. She reports that she does not drink alcohol and does not use drugs.    Review of Systems  Negative except as noted in the HPI.      Objective:      Physical Exam  Constitutional:       Appearance: Normal appearance.   HENT:      Head: Normocephalic.      Mouth/Throat:      Mouth: Mucous membranes are moist.   Eyes:      Extraocular Movements: Extraocular movements intact.      Conjunctiva/sclera: Conjunctivae normal.      Pupils: Pupils are equal, round, and reactive to light.   Cardiovascular:      Rate and Rhythm: Normal rate and regular rhythm.      Pulses: Normal pulses.      Heart sounds: Normal heart sounds.   Pulmonary:      Effort: Pulmonary effort is normal.      Breath sounds: Normal breath sounds.   Abdominal:      General: Bowel sounds are normal.      Palpations: Abdomen is soft.   Musculoskeletal:         General: Normal range of motion.      Cervical back: Normal range of motion and neck supple.   Skin:     General: Skin is warm and dry.   Neurological:      General: No focal deficit present.      Mental Status: She is alert and oriented to person, place, and time.   Psychiatric:         Mood and Affect: Mood normal.         Behavior: Behavior normal.         Thought Content: Thought content normal.         Judgment: Judgment normal.       Home Medications:     Current Outpatient Medications   Medication Sig    atorvastatin (LIPITOR) 40 MG tablet Take 1 tablet (40 mg total) by mouth once daily.    baclofen (LIORESAL) 10 MG tablet Take 1 tablet (10 mg total) by mouth 2 (two) times daily as needed (muscle spasm or pain).    BD CAYLA 2ND GEN PEN NEEDLE 32 gauge x 5/32" Ndle SMARTSIG:Injection Every Night    betamethasone dipropionate 0.05 % cream   See Instructions, APPLY  CREAM TOPICALLY TWICE DAILY, # 60 gm, 0 Refill(s), Pharmacy: [id number], 155, cm, Height/Length Dosing, 07/08/21 8:39:00 CDT, 112, kg, Weight " Dosing, 21 11:11:00 CDT    blood sugar diagnostic Strp To check BG 2 times daily, to use with insurance preferred meter    blood-glucose meter kit To check BG 2 times daily, to use with insurance preferred meter    diclofenac sodium (VOLTAREN) 1 % Gel Apply 2 g topically 2 (two) times daily as needed (pain).    DULoxetine (CYMBALTA) 60 MG capsule Take 1 capsule (60 mg total) by mouth every evening.    empagliflozin (JARDIANCE) 10 mg tablet Take 1 tablet (10 mg total) by mouth once daily.    enalapril (VASOTEC) 2.5 MG tablet Take 1 tablet (2.5 mg total) by mouth once daily.    hydrOXYzine pamoate (VISTARIL) 25 MG Cap TAKE 1 CAPSULE BY MOUTH THREE TIMES DAILY AS NEEDED FOR ANXIETY    lancets Misc To check BG 2 times daily, to use with insurance preferred meter    LEVEMIR FLEXPEN 100 unit/mL (3 mL) InPn pen SMARTSI Unit(s) SUB-Q Every Evening    LINZESS 290 mcg Cap capsule Take 1 capsule by mouth once daily    LOKELMA 10 gram packet SMARTSI Packet(s) By Mouth 3 Times a Week    metoprolol tartrate (LOPRESSOR) 25 MG tablet Take 1 tablet (25 mg total) by mouth 2 (two) times daily.    omeprazole (PRILOSEC) 40 MG capsule Take 1 capsule (40 mg total) by mouth once daily. (Patient taking differently: Take 40 mg by mouth daily as needed.)    SITagliptan-metformin (JANUMET) 50-1,000 mg per tablet Take 1 tablet by mouth 2 (two) times a day.    fluconazole (DIFLUCAN) 200 MG Tab Take 200 mg by mouth every other day.    insulin detemir U-100 (LEVEMIR U-100 INSULIN) 100 unit/mL injection Inject 30 Units into the skin every evening.    ketoconazole (NIZORAL) 2 % cream SMARTSIG:Sparingly Topical Twice Daily    nystatin (MYCOSTATIN) powder SMARTSI Gram(s) Topical Twice Daily    sodium bicarbonate 650 MG tablet Take 2 tablets (1,300 mg total) by mouth once daily. (Patient not taking: Reported on 2023)     Laboratory Results:     Recent Results (from the past 2016 hour(s))   Hemoglobin A1C    Collection Time:  04/28/23  9:33 AM   Result Value Ref Range    Hemoglobin A1c 7.9 (H) <=7.0 %    Estimated Average Glucose 180.0 mg/dL   Comprehensive Metabolic Panel    Collection Time: 04/28/23  9:33 AM   Result Value Ref Range    Sodium Level 138 136 - 145 mmol/L    Potassium Level 5.2 (H) 3.5 - 5.1 mmol/L    Chloride 109 (H) 98 - 107 mmol/L    Carbon Dioxide 19 (L) 22 - 29 mmol/L    Glucose Level 159 (H) 74 - 100 mg/dL    Blood Urea Nitrogen 32.2 (H) 9.8 - 20.1 mg/dL    Creatinine 1.55 (H) 0.55 - 1.02 mg/dL    Calcium Level Total 10.1 8.4 - 10.2 mg/dL    Protein Total 8.0 6.4 - 8.3 gm/dL    Albumin Level 3.8 3.5 - 5.0 g/dL    Globulin 4.2 (H) 2.4 - 3.5 gm/dL    Albumin/Globulin Ratio 0.9 (L) 1.1 - 2.0 ratio    Bilirubin Total 0.4 <=1.5 mg/dL    Alkaline Phosphatase 89 40 - 150 unit/L    Alanine Aminotransferase 19 0 - 55 unit/L    Aspartate Aminotransferase 23 5 - 34 unit/L    eGFR 39 mls/min/1.73/m2   CBC with Differential    Collection Time: 04/28/23  9:33 AM   Result Value Ref Range    WBC 6.6 4.5 - 11.5 x10(3)/mcL    RBC 4.27 4.20 - 5.40 x10(6)/mcL    Hgb 11.8 (L) 12.0 - 16.0 g/dL    Hct 37.9 37.0 - 47.0 %    MCV 88.8 80.0 - 94.0 fL    MCH 27.6 27.0 - 31.0 pg    MCHC 31.1 (L) 33.0 - 36.0 g/dL    RDW 13.4 11.5 - 17.0 %    Platelet 381 130 - 400 x10(3)/mcL    MPV 9.4 7.4 - 10.4 fL    Neut % 41.0 %    Lymph % 42.2 %    Mono % 7.1 %    Eos % 7.5 %    Basophil % 2.0 %    Lymph # 2.80 0.6 - 4.6 x10(3)/mcL    Neut # 2.73 2.1 - 9.2 x10(3)/mcL    Mono # 0.47 0.1 - 1.3 x10(3)/mcL    Eos # 0.50 0 - 0.9 x10(3)/mcL    Baso # 0.13 0 - 0.2 x10(3)/mcL    IG# 0.01 0 - 0.04 x10(3)/mcL    IG% 0.2 %    NRBC% 0.0 %   Protein/Creatinine Ratio, Urine    Collection Time: 04/28/23  9:34 AM   Result Value Ref Range    Urine Protein Level 34.3 mg/dL    Urine Creatinine 235.9 (H) 47.0 - 110.0 mg/dL    Urine Protein/Creatinine Ratio 0.1    Urinalysis, Reflex to Urine Culture    Collection Time: 04/28/23  9:34 AM    Specimen: Urine   Result Value Ref  Range    Color, UA Yellow Yellow, Light-Yellow, Dark Yellow, Akila, Straw    Appearance, UA Turbid (A) Clear    Specific Gravity, UA 1.024     pH, UA 5.0 5.0 - 8.5    Protein, UA 1+ (A) Negative mg/dL    Glucose, UA 4+ (A) Negative, Normal mg/dL    Ketones, UA Negative Negative mg/dL    Blood, UA Negative Negative unit/L    Bilirubin, UA Negative Negative mg/dL    Urobilinogen, UA Normal 0.2, 1.0, Normal mg/dL    Nitrites, UA Negative Negative    Leukocyte Esterase,  (A) Negative unit/L    WBC, UA 6-10 (A) None Seen, 0-2, 3-5, 0-5 /HPF    Bacteria, UA Trace (A) None Seen /HPF    Squamous Epithelial Cells, UA Few (A) None Seen /HPF    Mucous, UA Trace (A) None Seen /LPF    Hyaline Casts, UA 0-2 (A) None Seen /lpf    RBC, UA 0-5 None Seen, 0-2, 3-5, 0-5 /HPF     Assessment/Plan:     Problem List Items Addressed This Visit          GI    Gastroesophageal reflux disease - Primary     GERD lifestyle modifications  Reflux precautions  Avoid NSAID use  Continue omeprazole 40 mg daily (refilled)  Will consider EGD with persistent or worsening symptoms  Call with updates  Follow-up clinic visit with NP in 6 months         Relevant Medications    omeprazole (PRILOSEC) 40 MG capsule    Chronic constipation     Recommend soluble fiber supplementation  Avoid straining or sitting on the toilet for long periods of time  Continue Linzess 290 mcg daily (refilled)  Okay to add MiraLAX 17 g daily as needed  Discussed starting Motegrity if Linzess becomes ineffective in controlling symptoms  Call with updates  Follow-up clinic visit with NP in 6 months         Relevant Medications    LINZESS 290 mcg Cap capsule    Screening for colon cancer     Stool for occult blood was negative October 14, 2022.

## 2023-07-21 DIAGNOSIS — E87.5 HYPERKALEMIA: ICD-10-CM

## 2023-07-21 RX ORDER — SODIUM ZIRCONIUM CYCLOSILICATE 10 G/10G
POWDER, FOR SUSPENSION ORAL
Qty: 45 PACKET | Refills: 0 | Status: SHIPPED | OUTPATIENT
Start: 2023-07-21 | End: 2024-02-19

## 2023-07-24 ENCOUNTER — TELEPHONE (OUTPATIENT)
Dept: NEPHROLOGY | Facility: CLINIC | Age: 58
End: 2023-07-24
Payer: MEDICARE

## 2023-07-24 RX ORDER — BACLOFEN 10 MG/1
10 TABLET ORAL 2 TIMES DAILY PRN
Qty: 30 TABLET | Refills: 1 | Status: SHIPPED | OUTPATIENT
Start: 2023-07-24 | End: 2023-09-28 | Stop reason: SDUPTHER

## 2023-07-24 NOTE — TELEPHONE ENCOUNTER
Spoke with Rosalba [employer] advocate,  [employer] approved 1/2023-12/31/2023 $0 copay  PA approval #  [id number]  RE#  [id number]

## 2023-07-25 ENCOUNTER — PATIENT MESSAGE (OUTPATIENT)
Dept: ADMINISTRATIVE | Facility: HOSPITAL | Age: 58
End: 2023-07-25
Payer: MEDICARE

## 2023-08-02 ENCOUNTER — PATIENT OUTREACH (OUTPATIENT)
Dept: ADMINISTRATIVE | Facility: HOSPITAL | Age: 58
End: 2023-08-02
Payer: MEDICARE

## 2023-08-02 NOTE — PROGRESS NOTES
Population Health Chart Review & Patient Outreach Details:     Reason for Outreach Encounter:     [x]  Non-Compliant Report   []  Payor Report ([employer], PHN, BCBS, MSSP, MCIP, UHC, etc.)   []  Pre-Visit Chart Review     Updates Requested / Reviewed:     [x]  Care Everywhere    [x]     [x]  External Sources ([facility], [facility], [facility], etc.)   []  Care Team Updated    Patient Outreach Method:    []  Telephone Outreach Completed   [] Successful   [] Left Voicemail   [] Unable to Contact (wrong number, no voicemail)  []  [employer] Portal Outreach Sent  []  Letter Outreach Mailed  []  Fax Sent for External Records  []  External Records Upload    Health Maintenance Topics Addressed and Outreach Outcomes / Actions Taken:        []      Breast Cancer Screening []  Mammo Scheduled      []  External Records Requested     []  Added Reminder to Complete to Upcoming Primary Care Appt Notes     []  Patient Declined     []  Patient Will Call Back to Schedule     []  Patient Will Schedule with External Provider / Order Routed if Applicable             []       Cervical Cancer Screening []  Pap Scheduled      []  External Records Requested     []  Added Reminder to Complete to Upcoming Primary Care Appt Notes     []  Patient Declined     []  Patient Will Call Back to Schedule     []  Patient Will Schedule with External Provider               [x]          Colorectal Cancer Screening []  Colonoscopy Case Request or Referral Placed     []  External Records Requested     []  Added Reminder to Complete to Upcoming Primary Care Appt Notes     []  Patient Declined     []  Patient Will Call Back to Schedule     []  Patient Will Schedule with External Provider     []  Fit Kit Mailed (add the SmartPhrase under additional notes)     []  Reminded Patient to Complete Home Test             []      Diabetic Eye Exam []  Eye Camera Scheduled or Optometry Referral Placed     []  External Records Requested     []  Added Reminder to Complete to  Upcoming Primary Care Appt Notes     []  Patient Declined     []  Patient Will Call Back to Schedule     []  Patient Will Schedule with External Provider             []      Blood Pressure Control []  Primary Care Follow Up Visit Scheduled     []  Remote Blood Pressure Reading Captured     []  Added Reminder to Complete to Upcoming Primary Care Appt Notes     []  Patient Declined     []  Patient Will Call Back / Patient Will Send Portal Message with Reading     []  Patient Will Call Back to Schedule Provider Visit             []       HbA1c & Other Labs []  Lab Appt Scheduled for Due Labs     []  Primary Care Follow Up Visit Scheduled      []  Reminded Patient to Complete Home Test     []  Added Reminder to Complete to Upcoming Primary Care Appt Notes     []  Patient Declined     []  Patient Will Call Back to Schedule     []  Patient Will Schedule with External Provider / Order Routed if Applicable           []    Schedule Primary Care Appt []  Primary Care Appt Scheduled     []  Patient Declined     []  Patient Will Call Back to Schedule     []  Pt Established with External Provider & Updated Care Team             []      Medication Adherence []  Primary Care Appointment Scheduled     []  Added Reminder to Upcoming Primary Care Appt Notes     []  Patient Reminded to  Prescription     []  Patient Declined, Provider Notified if Needed     []  Sent Provider Message to Review and/or Add Exclusion to Problem List             []      Osteoporosis Screening []  DXA Appointment Scheduled     []  External Records Requested     []  Added Reminder to Complete to Upcoming Primary Care Appt Notes     []  Patient Declined     []  Patient Will Call Back to Schedule     []  Patient Will Schedule with External Provider / Order Routed if Applicable   CHART REVIEW/NO PT OUTREACH  Additional Care Coordinator Notes:     Pt needs FOBT. No record of Cscope in chart.     Further Action Needed If Patient Returns Outreach:

## 2023-08-03 ENCOUNTER — TELEPHONE (OUTPATIENT)
Dept: NEPHROLOGY | Facility: CLINIC | Age: 58
End: 2023-08-03
Payer: MEDICARE

## 2023-08-03 RX ORDER — ENALAPRIL MALEATE 2.5 MG/1
TABLET ORAL
Qty: 30 TABLET | Refills: 0 | Status: SHIPPED | OUTPATIENT
Start: 2023-08-03 | End: 2023-08-14

## 2023-08-03 NOTE — TELEPHONE ENCOUNTER
Please see the attached refill request.  LOV: 05/01/23  RTC: 08/08/23   Partial Purse String (Intermediate) Text: Given the location of the defect and the characteristics of the surrounding skin an intermediate purse string closure was deemed most appropriate.  Undermining was performed circumfirentially around the surgical defect.  A purse string suture was then placed and tightened. Wound tension only allowed a partial closure of the circular defect.

## 2023-08-03 NOTE — PROGRESS NOTES
CHIEF COMPLAINT:   No chief complaint on file.                  Review of patient's allergies indicates:   Allergen Reactions    Aspirin                                           HPI:  Gloria Denis 57 y.o. female with a past medical history of hypertension, hyperlipidemia, sleep apnea, diabetes mellitus, depression, anxiety, and chronic back pain who presents for follow up and ongoing care.  Patient completed an echocardiogram on June 30, 2022 which revealed an ejection fraction of 60% with grade 1 diastolic dysfunction.  See report below.  She completed AMBER testing on July 15, 2022 which revealed no significant arterial flow reduction in the bilateral lower extremities.  See report below.   She completed a Lexiscan stress test in July 2020 which revealed no ischemia. She also completed AMBER testing in June 2020 which revealed no evidence of significant arterial insufficiency. She continues to ambulate with a cane due to chronic back pain.  At last appointment patient continued to endorse shortness of breath with exertion and occasional dizziness with sudden movements, otherwise she denied any cardiac complaints.    Today the patient reports ongoing dizziness and lightheadedness but has actually improved since last office visit.  She denies any chest pain, shortness of breath, palpitations, orthopnea, PND, claudication symptoms, or syncopal episodes.  She states that she has been doing pretty well overall.  She continues to follow a heart healthy diet and has continued to lose weight.  She states that she is able to complete her ADLs without any issues or ischemic symptoms.  She reports being mildly active at home but her main limiting factor is chronic back pain and fatigue.  However, she states that she is hoping to become more active in the future.  She denies any tobacco use.  She states that she receive the new parts for her BiPAP and plans to reassemble that she can start using it again.  She  reports compliance with all medications.      CARDIAC TESTING  AMBER Testing 7.15.22:                                                                                                       The right lower extremity demonstrated multiphasic waveforms at all levels.   The AMBER on the right was normal at 1.15.  The TBI on the right was normal at 0.77.  The right lower extremity demonstrated no significant arterial flow reduction.     The left lower extremity demonstrated multiphasic waveforms at all levels.   The AMBER on the left was invalid due to non-compressible vessels at the ankle.  The TBI on the left was normal at 0.80.  The left lower extremity demonstrated no significant arterial flow reduction.                                                                                                                                                                                                                                                                                                    CARDIAC TESTING:  Results for orders placed during the hospital encounter of 06/30/22    Echo    Interpretation Summary  · The left ventricle is normal in size with concentric remodeling and normal systolic function.  · The estimated ejection fraction is 60%.  · Grade I left ventricular diastolic dysfunction.  · Normal right ventricular size with normal right ventricular systolic function.  · Normal central venous pressure (3 mmHg).  · The estimated PA systolic pressure is 23 mmHg.  · IVC is normal.  · There is no pulmonary hypertension.    Lexiscan stress test July 2020:  Scan is consistent with: No ischemia  No scar  Attenuation artifact in the inferolateral wall  Ejection fraction: 90%.    AMBER testing June 2020:  No evidence of significant arterial insufficiency was identified in the study.    Echocardiogram June 2019:  Left ventricular ejection fraction is measured at approximately 55-60%.  Normal right ventricular size with preserved  RV function.  No significant valvular abnormalities.  There is trace tricuspid regurgitation with estimated RVSP of 24 mmHg.  No evidence of pericardial effusion.          Patient Active Problem List   Diagnosis    Mixed anxiety depressive disorder    Chronic back pain    Diabetic neuropathy    Hyperlipidemia LDL goal <70    Primary hypertension    Sleep apnea    Sciatica    Chronic constipation    Gastroesophageal reflux disease    Screening for colon cancer    CKD (chronic kidney disease)    Chronic bilateral low back pain with bilateral sciatica    BMI 40.0-44.9, adult    CROUCH (dyspnea on exertion)    Cellulitis    Type 2 diabetes mellitus with stage 3a chronic kidney disease, with long-term current use of insulin    Type 2 diabetes mellitus with microalbuminuria, without long-term current use of insulin    JORGE ALBERTO (obstructive sleep apnea)    Polyneuropathy associated with underlying disease    NPDR (nonproliferative diabetic retinopathy)     Past Surgical History:   Procedure Laterality Date    ABSCESS DRAINAGE      I don't remember the date    ARTHROSCOPIC REMOVAL OF LOOSE BODY FROM JOINT       SECTION      CHOLECYSTECTOMY      Drainage of oral abscess      Exploration using laparoscope      UPPER GASTROINTESTINAL ENDOSCOPY       Social History     Socioeconomic History    Marital status:      Spouse name: Kp   Tobacco Use    Smoking status: Never    Smokeless tobacco: Never   Substance and Sexual Activity    Alcohol use: Never    Drug use: Never    Sexual activity: Not Currently     Partners: Male     Birth control/protection: None     Social Determinants of Health     Financial Resource Strain: Low Risk  (2023)    Overall Financial Resource Strain (CARDIA)     Difficulty of Paying Living Expenses: Not very hard   Food Insecurity: No Food Insecurity (2023)    Hunger Vital Sign     Worried About Running Out of Food in the Last Year: Never true     Ran Out of Food in the Last Year: Never  "true   Transportation Needs: No Transportation Needs (1/9/2023)    PRAPARE - Transportation     Lack of Transportation (Medical): No     Lack of Transportation (Non-Medical): No   Physical Activity: Inactive (1/9/2023)    Exercise Vital Sign     Days of Exercise per Week: 0 days     Minutes of Exercise per Session: 0 min   Stress: Stress Concern Present (1/9/2023)    [employer] - Occupational Stress Questionnaire     Feeling of Stress : To some extent   Social Connections: Moderately Isolated (1/9/2023)    Social Connection and Isolation Panel [NHANES]     Frequency of Communication with Friends and Family: More than three times a week     Frequency of Social Gatherings with Friends and Family: More than three times a week     Attends [employer] Services: More than 4 times per year     Active Member of Clubs or Organizations: No     Attends Club or Organization Meetings: Never     Marital Status:    Housing Stability: Low Risk  (1/9/2023)    Housing Stability Vital Sign     Unable to Pay for Housing in the Last Year: No     Number of Places Lived in the Last Year: 1     Unstable Housing in the Last Year: No        Family History   Problem Relation Age of Onset    Cancer Mother     Heart disease Mother     Rheum arthritis Mother     Diabetes Father     Heart attack Father     Kidney failure Father     Stroke Father     Cancer Brother          Current Outpatient Medications:     atorvastatin (LIPITOR) 40 MG tablet, Take 1 tablet (40 mg total) by mouth once daily., Disp: 90 tablet, Rfl: 3    baclofen (LIORESAL) 10 MG tablet, Take 1 tablet (10 mg total) by mouth 2 (two) times daily as needed (muscle spasm or pain)., Disp: 30 tablet, Rfl: 1    BD CAYLA 2ND GEN PEN NEEDLE 32 gauge x 5/32" Ndle, SMARTSIG:Injection Every Night, Disp: 100 each, Rfl: 2    betamethasone dipropionate 0.05 % cream,  See Instructions, APPLY  CREAM TOPICALLY TWICE DAILY, # 60 gm, 0 Refill(s), [facility]: [id number], 155, cm, Height/Length Dosing, 21 8:39:00 CDT, 112, kg, Weight Dosing, 21 11:11:00 CDT, Disp: , Rfl:     blood sugar diagnostic Strp, To check BG 2 times daily, to use with insurance preferred meter, Disp: 100 each, Rfl: 3    blood-glucose meter kit, To check BG 2 times daily, to use with insurance preferred meter, Disp: 1 each, Rfl: 0    diclofenac sodium (VOLTAREN) 1 % Gel, Apply 2 g topically 2 (two) times daily as needed (pain)., Disp: 100 g, Rfl: 3    DULoxetine (CYMBALTA) 60 MG capsule, Take 1 capsule (60 mg total) by mouth every evening., Disp: 90 capsule, Rfl: 3    empagliflozin (JARDIANCE) 10 mg tablet, Take 1 tablet (10 mg total) by mouth once daily., Disp: 90 tablet, Rfl: 1    enalapril (VASOTEC) 2.5 MG tablet, Take 1 tablet (2.5 mg total) by mouth once daily., Disp: 90 tablet, Rfl: 1    fluconazole (DIFLUCAN) 200 MG Tab, Take 200 mg by mouth every other day., Disp: , Rfl:     hydrOXYzine pamoate (VISTARIL) 25 MG Cap, TAKE 1 CAPSULE BY MOUTH THREE TIMES DAILY AS NEEDED FOR ANXIETY, Disp: 90 capsule, Rfl: 1    ketoconazole (NIZORAL) 2 % cream, SMARTSIG:Sparingly Topical Twice Daily, Disp: , Rfl:     lancets Misc, To check BG 2 times daily, to use with insurance preferred meter, Disp: 100 each, Rfl: 3    LEVEMIR FLEXPEN 100 unit/mL (3 mL) InPn pen, SMARTSI Unit(s) SUB-Q Every Evening, Disp: , Rfl:     LINZESS 290 mcg Cap capsule, Take 1 capsule (290 mcg total) by mouth once daily., Disp: 90 capsule, Rfl: 3    LOKELMA 10 gram packet, SMARTSI Packet(s) By Mouth 3 Times a Week, Disp: 45 packet, Rfl: 0    metoprolol tartrate (LOPRESSOR) 25 MG tablet, Take 1 tablet (25 mg total) by mouth 2 (two) times daily., Disp: 180 tablet, Rfl: 3    nystatin (MYCOSTATIN) powder, SMARTSI Gram(s) Topical Twice Daily, Disp: , Rfl:     omeprazole (PRILOSEC) 40 MG capsule, Take 1 capsule (40 mg total) by mouth once daily., Disp: 30 capsule, Rfl: 5    SITagliptan-metformin (JANUMET) 50-1,000 mg  "per tablet, Take 1 tablet by mouth 2 (two) times a day., Disp: 180 tablet, Rfl: 1    sodium bicarbonate 650 MG tablet, Take 2 tablets (1,300 mg total) by mouth once daily. (Patient not taking: Reported on 7/17/2023), Disp: 180 tablet, Rfl: 3     ROS:                                                                                                                                                                             Review of Systems   Constitutional: Negative.    Respiratory:  Positive for shortness of breath.    Cardiovascular: Negative.    Gastrointestinal: Negative.    Musculoskeletal:  Positive for back pain.   Skin: Negative.    Neurological:  Positive for dizziness.   Psychiatric/Behavioral: Negative.          Blood pressure 114/74, pulse 74, resp. rate 18, height 5' 2.21" (1.58 m), weight 96.7 kg (213 lb 3.2 oz), SpO2 100 %.   PE:  Physical Exam  Constitutional:       Appearance: Normal appearance.   HENT:      Head: Normocephalic and atraumatic.   Eyes:      Extraocular Movements: Extraocular movements intact.      Pupils: Pupils are equal, round, and reactive to light.   Cardiovascular:      Rate and Rhythm: Normal rate and regular rhythm.   Pulmonary:      Effort: Pulmonary effort is normal.      Breath sounds: Normal breath sounds.   Abdominal:      Palpations: Abdomen is soft.   Musculoskeletal:         General: Normal range of motion.      Cervical back: Normal range of motion. No tenderness.   Skin:     General: Skin is warm and dry.   Neurological:      General: No focal deficit present.      Mental Status: She is alert and oriented to person, place, and time.   Psychiatric:         Mood and Affect: Mood normal.         Behavior: Behavior normal.      ASSESSMENT/PLAN:  Lightheadedness/Dizziness  - Reports occasional lightheadedness/dizziness that is stable from last office visit.  In fact she states it is improved  - Not limiting or interfering in ADLs  - Denies any syncopal events    Chest Pain - " resolved  - Denies any recent chest pain  - Lexiscan Stress test July 2020 - no ischemia    CROUCH   - Reports occasional CROUCH that is mild in severity and has not progressed since last office visit  - EF 60% per Echo June 2022  - EF 55-60% per Echo June 2019    Hypertension  - BP at goal today  - Continue Enalapril and Metoprolol tartrate  - Counseled on low salt diet and increased activity as tolerated     Hyperlipidemia   - LDL 92 on 1.10.23- near goal   - Continue Atorvastatin 40mg daily   - Strongly encouraged a low cholesterol, low fat diet and increased activity as tolerated   - Patient will complete FLP prior next office visit    Sleep apnea on BiPAP   - Recommend nightly BiPAP use when she is able to reassemble with the new parts    Diabetes mellitus  - Management per PCP    Depression/Anxiety  - Management per PCP    Chronic back pain  - Continue management per PCP    Bilateral lower extremity pain  - AMBER testing July 2022:  no significant arterial flow reduction in the bilateral lower extremities      EKG today   Patient to complete labs ordered per PCP prior to next office visit   Follow up in cardiology clinic in 6 months or sooner if needed   Follow up with PCP as directed

## 2023-08-04 ENCOUNTER — TELEPHONE (OUTPATIENT)
Dept: NEPHROLOGY | Facility: CLINIC | Age: 58
End: 2023-08-04
Payer: MEDICARE

## 2023-08-04 NOTE — TELEPHONE ENCOUNTER
Spoke with pt:  continue to take Lokelma 1 10mg pack   [facility] will call when medication is ready.  States understanding

## 2023-08-04 NOTE — TELEPHONE ENCOUNTER
[facility] states the pharmacy needs a PA.

## 2023-08-04 NOTE — TELEPHONE ENCOUNTER
Spoke with Jean Badillo advocate: [employer] approved 99 for 90 days until 12/21/2023 $0 cost.  RE# [id number]

## 2023-08-07 DIAGNOSIS — M54.9 DORSALGIA, UNSPECIFIED: Primary | ICD-10-CM

## 2023-08-07 DIAGNOSIS — M54.41 CHRONIC BILATERAL LOW BACK PAIN WITH BILATERAL SCIATICA: Chronic | ICD-10-CM

## 2023-08-07 DIAGNOSIS — G89.29 CHRONIC BILATERAL LOW BACK PAIN WITH BILATERAL SCIATICA: Chronic | ICD-10-CM

## 2023-08-07 DIAGNOSIS — M54.42 CHRONIC BILATERAL LOW BACK PAIN WITH BILATERAL SCIATICA: Chronic | ICD-10-CM

## 2023-08-08 ENCOUNTER — LAB VISIT (OUTPATIENT)
Dept: LAB | Facility: HOSPITAL | Age: 58
End: 2023-08-08
Attending: NURSE PRACTITIONER
Payer: MEDICARE

## 2023-08-08 ENCOUNTER — OFFICE VISIT (OUTPATIENT)
Dept: INTERNAL MEDICINE | Facility: CLINIC | Age: 58
End: 2023-08-08
Payer: MEDICARE

## 2023-08-08 ENCOUNTER — CLINICAL SUPPORT (OUTPATIENT)
Dept: INTERNAL MEDICINE | Facility: CLINIC | Age: 58
End: 2023-08-08
Attending: NURSE PRACTITIONER
Payer: MEDICARE

## 2023-08-08 VITALS
BODY MASS INDEX: 43 KG/M2 | TEMPERATURE: 98 F | DIASTOLIC BLOOD PRESSURE: 68 MMHG | HEART RATE: 67 BPM | RESPIRATION RATE: 18 BRPM | WEIGHT: 219 LBS | HEIGHT: 60 IN | SYSTOLIC BLOOD PRESSURE: 103 MMHG

## 2023-08-08 DIAGNOSIS — I10 PRIMARY HYPERTENSION: Chronic | ICD-10-CM

## 2023-08-08 DIAGNOSIS — E11.22 TYPE 2 DIABETES MELLITUS WITH STAGE 3A CHRONIC KIDNEY DISEASE, WITH LONG-TERM CURRENT USE OF INSULIN: Chronic | ICD-10-CM

## 2023-08-08 DIAGNOSIS — N18.31 TYPE 2 DIABETES MELLITUS WITH STAGE 3A CHRONIC KIDNEY DISEASE, WITH LONG-TERM CURRENT USE OF INSULIN: ICD-10-CM

## 2023-08-08 DIAGNOSIS — Z12.11 SCREENING FOR MALIGNANT NEOPLASM OF COLON: ICD-10-CM

## 2023-08-08 DIAGNOSIS — N18.31 TYPE 2 DIABETES MELLITUS WITH STAGE 3A CHRONIC KIDNEY DISEASE, WITH LONG-TERM CURRENT USE OF INSULIN: Chronic | ICD-10-CM

## 2023-08-08 DIAGNOSIS — Z13.5 ENCOUNTER FOR SCREENING FOR DIABETIC RETINOPATHY: Primary | ICD-10-CM

## 2023-08-08 DIAGNOSIS — Z13.5 ENCOUNTER FOR SCREENING FOR DIABETIC RETINOPATHY: ICD-10-CM

## 2023-08-08 DIAGNOSIS — E11.22 TYPE 2 DIABETES MELLITUS WITH STAGE 3A CHRONIC KIDNEY DISEASE, WITH LONG-TERM CURRENT USE OF INSULIN: ICD-10-CM

## 2023-08-08 DIAGNOSIS — Z79.4 TYPE 2 DIABETES MELLITUS WITH STAGE 3A CHRONIC KIDNEY DISEASE, WITH LONG-TERM CURRENT USE OF INSULIN: ICD-10-CM

## 2023-08-08 DIAGNOSIS — G47.33 OSA (OBSTRUCTIVE SLEEP APNEA): Chronic | ICD-10-CM

## 2023-08-08 DIAGNOSIS — E78.5 HYPERLIPIDEMIA LDL GOAL <70: Chronic | ICD-10-CM

## 2023-08-08 DIAGNOSIS — Z79.4 TYPE 2 DIABETES MELLITUS WITH STAGE 3A CHRONIC KIDNEY DISEASE, WITH LONG-TERM CURRENT USE OF INSULIN: Chronic | ICD-10-CM

## 2023-08-08 DIAGNOSIS — Z12.31 ENCOUNTER FOR SCREENING MAMMOGRAM FOR MALIGNANT NEOPLASM OF BREAST: ICD-10-CM

## 2023-08-08 DIAGNOSIS — F41.8 MIXED ANXIETY DEPRESSIVE DISORDER: Chronic | ICD-10-CM

## 2023-08-08 PROBLEM — E11.29 TYPE 2 DIABETES MELLITUS WITH MICROALBUMINURIA, WITHOUT LONG-TERM CURRENT USE OF INSULIN: Status: RESOLVED | Noted: 2023-02-20 | Resolved: 2023-08-08

## 2023-08-08 PROBLEM — R80.9 TYPE 2 DIABETES MELLITUS WITH MICROALBUMINURIA, WITHOUT LONG-TERM CURRENT USE OF INSULIN: Status: RESOLVED | Noted: 2023-02-20 | Resolved: 2023-08-08

## 2023-08-08 LAB
ANION GAP SERPL CALC-SCNC: 11 MEQ/L
BUN SERPL-MCNC: 23.3 MG/DL (ref 9.8–20.1)
CALCIUM SERPL-MCNC: 9.4 MG/DL (ref 8.4–10.2)
CHLORIDE SERPL-SCNC: 110 MMOL/L (ref 98–107)
CO2 SERPL-SCNC: 20 MMOL/L (ref 22–29)
CREAT SERPL-MCNC: 1.4 MG/DL (ref 0.55–1.02)
CREAT/UREA NIT SERPL: 17
EST. AVERAGE GLUCOSE BLD GHB EST-MCNC: 154.2 MG/DL
GFR SERPLBLD CREATININE-BSD FMLA CKD-EPI: 44 MLS/MIN/1.73/M2
GLUCOSE SERPL-MCNC: 155 MG/DL (ref 74–100)
HBA1C MFR BLD: 7 %
LEFT EYE DM RETINOPATHY: NEGATIVE
POTASSIUM SERPL-SCNC: 4.8 MMOL/L (ref 3.5–5.1)
RIGHT EYE DM RETINOPATHY: NEGATIVE
SODIUM SERPL-SCNC: 141 MMOL/L (ref 136–145)

## 2023-08-08 PROCEDURE — 3051F PR MOST RECENT HEMOGLOBIN A1C LEVEL 7.0 - < 8.0%: ICD-10-PCS | Mod: CPTII,,, | Performed by: NURSE PRACTITIONER

## 2023-08-08 PROCEDURE — 4010F PR ACE/ARB THEARPY RXD/TAKEN: ICD-10-PCS | Mod: CPTII,,, | Performed by: NURSE PRACTITIONER

## 2023-08-08 PROCEDURE — 1160F PR REVIEW ALL MEDS BY PRESCRIBER/CLIN PHARMACIST DOCUMENTED: ICD-10-PCS | Mod: CPTII,,, | Performed by: NURSE PRACTITIONER

## 2023-08-08 PROCEDURE — 36415 COLL VENOUS BLD VENIPUNCTURE: CPT

## 2023-08-08 PROCEDURE — 1159F MED LIST DOCD IN RCRD: CPT | Mod: CPTII,,, | Performed by: NURSE PRACTITIONER

## 2023-08-08 PROCEDURE — 3074F PR MOST RECENT SYSTOLIC BLOOD PRESSURE < 130 MM HG: ICD-10-PCS | Mod: CPTII,,, | Performed by: NURSE PRACTITIONER

## 2023-08-08 PROCEDURE — 3008F BODY MASS INDEX DOCD: CPT | Mod: CPTII,,, | Performed by: NURSE PRACTITIONER

## 2023-08-08 PROCEDURE — 3078F PR MOST RECENT DIASTOLIC BLOOD PRESSURE < 80 MM HG: ICD-10-PCS | Mod: CPTII,,, | Performed by: NURSE PRACTITIONER

## 2023-08-08 PROCEDURE — 92228 IMG RTA DETC/MNTR DS PHY/QHP: CPT | Mod: PBBFAC

## 2023-08-08 PROCEDURE — 99214 OFFICE O/P EST MOD 30 MIN: CPT | Mod: 25,S$PBB,, | Performed by: NURSE PRACTITIONER

## 2023-08-08 PROCEDURE — 3051F HG A1C>EQUAL 7.0%<8.0%: CPT | Mod: CPTII,,, | Performed by: NURSE PRACTITIONER

## 2023-08-08 PROCEDURE — 99215 OFFICE O/P EST HI 40 MIN: CPT | Mod: PBBFAC | Performed by: NURSE PRACTITIONER

## 2023-08-08 PROCEDURE — 3078F DIAST BP <80 MM HG: CPT | Mod: CPTII,,, | Performed by: NURSE PRACTITIONER

## 2023-08-08 PROCEDURE — 3066F PR DOCUMENTATION OF TREATMENT FOR NEPHROPATHY: ICD-10-PCS | Mod: CPTII,,, | Performed by: NURSE PRACTITIONER

## 2023-08-08 PROCEDURE — 80048 BASIC METABOLIC PNL TOTAL CA: CPT

## 2023-08-08 PROCEDURE — 3074F SYST BP LT 130 MM HG: CPT | Mod: CPTII,,, | Performed by: NURSE PRACTITIONER

## 2023-08-08 PROCEDURE — 99214 PR OFFICE/OUTPT VISIT, EST, LEVL IV, 30-39 MIN: ICD-10-PCS | Mod: 25,S$PBB,, | Performed by: NURSE PRACTITIONER

## 2023-08-08 PROCEDURE — 83036 HEMOGLOBIN GLYCOSYLATED A1C: CPT

## 2023-08-08 PROCEDURE — 1160F RVW MEDS BY RX/DR IN RCRD: CPT | Mod: CPTII,,, | Performed by: NURSE PRACTITIONER

## 2023-08-08 PROCEDURE — 4010F ACE/ARB THERAPY RXD/TAKEN: CPT | Mod: CPTII,,, | Performed by: NURSE PRACTITIONER

## 2023-08-08 PROCEDURE — 1159F PR MEDICATION LIST DOCUMENTED IN MEDICAL RECORD: ICD-10-PCS | Mod: CPTII,,, | Performed by: NURSE PRACTITIONER

## 2023-08-08 PROCEDURE — 3008F PR BODY MASS INDEX (BMI) DOCUMENTED: ICD-10-PCS | Mod: CPTII,,, | Performed by: NURSE PRACTITIONER

## 2023-08-08 PROCEDURE — 3066F NEPHROPATHY DOC TX: CPT | Mod: CPTII,,, | Performed by: NURSE PRACTITIONER

## 2023-08-08 RX ORDER — INSULIN DETEMIR 100 [IU]/ML
INJECTION, SOLUTION SUBCUTANEOUS
Qty: 27 ML | Refills: 3 | Status: SHIPPED | OUTPATIENT
Start: 2023-08-08 | End: 2024-02-05 | Stop reason: ALTCHOICE

## 2023-08-08 RX ORDER — SODIUM BICARBONATE 650 MG/1
1300 TABLET ORAL DAILY
COMMUNITY

## 2023-08-08 RX ORDER — SITAGLIPTIN AND METFORMIN HYDROCHLORIDE 1000; 50 MG/1; MG/1
1 TABLET, FILM COATED ORAL 2 TIMES DAILY
Qty: 180 TABLET | Refills: 3 | Status: SHIPPED | OUTPATIENT
Start: 2023-08-08 | End: 2024-03-14 | Stop reason: ALTCHOICE

## 2023-08-08 NOTE — PROGRESS NOTES
Andreea Borrego, ALYX   [facility]  [address] [facility] [address]      PATIENT NAME: Gloria Denis  : 1965  DATE: 23  MRN: [id number]      Reason for Visit / Chief Complaint: Diabetes (Lab results, c/o congestion x 1 week , irritated scalp. Refused vaccines)       History of Present Illness / Problem Focused Workflow     Gloria Denis is a 57 y.o. Black or  female presents to the clinic for DM and HTN f/u. PMH HTN, dyslipidemia, JORGE ALBERTO on Bipap, DM, DM neuropathy, depression, anxiety, CKD, hyperkalemia, L NPDR, GERD, chronic constipation, morbid obesity, atrophic vaginitis, Yolanda-Yolanda dz, and chronic back pain. Followed by [facility] cardio, GYN, renal and GI clinics.    Today, pt reports continued med compliance. CBGs ranging in the 90s when checked. Attempts ADA diet. Cannot exercise d/t chronic back pain. Was attending PT but her car broke and has been unable to attend. Has never been contacted by [facility]. MRI scheduled later this month. States sodium bicarb was causing GI upset so she stopped it and symptoms have resolved. Will resume taking at least one daily. Continues to use cane while ambulating. Has not been wearing Bipap nightly as in the past but will resume. Labs reviewed with pt. Declines vaccines today. Is searching for new optho provider. Denies chest pain, shortness of breath, cough, fever, headache, dizziness, weakness, abdominal pain, nausea, vomiting, diarrhea, constipation, dysuria, SI/HI.    Review of Systems     Review of Systems     See HPI for details    Constitutional: Denies Change in appetite. Denies Chills. Denies Fever. Denies Night sweats.  Eye: Denies Blurred vision. Denies Discharge. Denies Eye pain.  ENT: Denies Decreased hearing. Denies Sore throat. Denies Swollen glands.  Respiratory: Denies Cough. Denies Shortness of breath. Denies Shortness of breath  with exertion. Denies Wheezing.  Cardiovascular: Denies Chest pain at rest. Denies Chest pain with exertion. Denies Irregular heartbeat. Denies Palpitations. Denies Edema.  Gastrointestinal: Denies Abdominal pain. Denies Diarrhea. Denies Nausea. Denies Vomiting. Denies Hematemesis or Hematochezia.  Genitourinary: Denies Dysuria. Denies Urinary frequency. Denies Urinary urgency. Denies Blood in urine.  Endocrine: Denies Cold intolerance. Denies Excessive thirst. Denies Heat intolerance. Denies Weight loss. Denies Weight gain.  Musculoskeletal: Admits Painful joints. Denies Weakness.  Integumentary: Denies Rash. Denies Itching. Denies Dry skin.  Neurologic: Denies Dizziness. Denies Fainting. Denies Headache.  Psychiatric: Denies Suicidal/Homicidal ideations.    All Other ROS: Negative except as stated in HPI.     Medical / Surgical / Social / Family History       ----------------------------  Anxiety disorder, unspecified  Chronic constipation  CKD (chronic kidney disease)  Depression  DM (diabetes mellitus)  Dyslipidemia  GERD (gastroesophageal reflux disease)  HTN (hypertension)  Irritable bowel syndrome  Morbid obesity  Sleep apnea, unspecified     Past Surgical History:   Procedure Laterality Date    ABSCESS DRAINAGE      I don't remember the date    ARTHROSCOPIC REMOVAL OF LOOSE BODY FROM JOINT       SECTION      CHOLECYSTECTOMY      Drainage of oral abscess      Exploration using laparoscope      UPPER GASTROINTESTINAL ENDOSCOPY         Social History     Socioeconomic History    Marital status:      Spouse name: Kp   Tobacco Use    Smoking status: Never     Passive exposure: Never    Smokeless tobacco: Never   Substance and Sexual Activity    Alcohol use: Never    Drug use: Never    Sexual activity: Not Currently     Partners: Male     Birth control/protection: None     Social Determinants of Health     Financial Resource Strain: Low Risk  (2023)    Overall Financial Resource Strain  "(CARDIA)     Difficulty of Paying Living Expenses: Not very hard   Food Insecurity: No Food Insecurity (1/9/2023)    Hunger Vital Sign     Worried About Running Out of Food in the Last Year: Never true     Ran Out of Food in the Last Year: Never true   Transportation Needs: No Transportation Needs (1/9/2023)    PRAPARE - Transportation     Lack of Transportation (Medical): No     Lack of Transportation (Non-Medical): No   Physical Activity: Inactive (1/9/2023)    Exercise Vital Sign     Days of Exercise per Week: 0 days     Minutes of Exercise per Session: 0 min   Stress: Stress Concern Present (1/9/2023)    [employer] - Occupational Stress Questionnaire     Feeling of Stress : To some extent   Social Connections: Moderately Isolated (1/9/2023)    Social Connection and Isolation Panel [NHANES]     Frequency of Communication with Friends and Family: More than three times a week     Frequency of Social Gatherings with Friends and Family: More than three times a week     Attends Sikh Services: More than 4 times per year     Active Member of Clubs or Organizations: No     Attends Club or Organization Meetings: Never     Marital Status:    Housing Stability: Low Risk  (1/9/2023)    Housing Stability Vital Sign     Unable to Pay for Housing in the Last Year: No     Number of Places Lived in the Last Year: 1     Unstable Housing in the Last Year: No        Family History   Problem Relation Age of Onset    Cancer Mother     Heart disease Mother     Rheum arthritis Mother     Diabetes Father     Heart attack Father     Kidney failure Father     Stroke Father     Cancer Brother         Medications and Allergies     Medications  Current Outpatient Medications   Medication Instructions    atorvastatin (LIPITOR) 40 mg, Oral, Daily    baclofen (LIORESAL) 10 mg, Oral, 2 times daily PRN    BD CAYLA 2ND GEN PEN NEEDLE 32 gauge x 5/32" Ndle SMARTSIG:Injection Every Night    betamethasone " "dipropionate 0.05 % cream   See Instructions, APPLY  CREAM TOPICALLY TWICE DAILY, # 60 gm, 0 Refill(s), Pharmacy: [id number], 155, cm, Height/Length Dosing, 21 8:39:00 CDT, 112, kg, Weight Dosing, 21 11:11:00 CDT    blood sugar diagnostic Strp To check BG 2 times daily, to use with insurance preferred meter    blood-glucose meter kit To check BG 2 times daily, to use with insurance preferred meter    diclofenac sodium (VOLTAREN) 2 g, Topical (Top), 2 times daily PRN    DULoxetine (CYMBALTA) 60 mg, Oral, Nightly    empagliflozin (JARDIANCE) 10 mg, Oral, Daily    enalapril (VASOTEC) 2.5 MG tablet Take 1 tablet by mouth once daily    hydrOXYzine pamoate (VISTARIL) 25 MG Cap TAKE 1 CAPSULE BY MOUTH THREE TIMES DAILY AS NEEDED FOR ANXIETY    ketoconazole (NIZORAL) 2 % cream SMARTSIG:Sparingly Topical Twice Daily    lancets Misc To check BG 2 times daily, to use with insurance preferred meter    LEVEMIR FLEXPEN 100 unit/mL (3 mL) InPn pen SMARTSI Unit(s) SUB-Q Every Evening    LINZESS 290 mcg, Oral, Daily    LOKELMA 10 gram packet SMARTSI Packet(s) By Mouth 3 Times a Week    metoprolol tartrate (LOPRESSOR) 25 mg, Oral, 2 times daily    nystatin (MYCOSTATIN) powder SMARTSI Gram(s) Topical Twice Daily    omeprazole (PRILOSEC) 40 mg, Oral, Daily    SITagliptan-metformin (JANUMET) 50-1,000 mg per tablet 1 tablet, Oral, 2 times daily    sodium bicarbonate 1,300 mg, Oral, Daily         Allergies  Review of patient's allergies indicates:   Allergen Reactions    Aspirin        Physical Examination     /68 (BP Location: Right arm, Patient Position: Sitting, BP Method: Large (Automatic))   Pulse 67   Temp 98.2 °F (36.8 °C) (Oral)   Resp 18   Ht 4' 11.84" (1.52 m)   Wt 99.3 kg (219 lb)   BMI 43.00 kg/m²     Physical Exam  Constitutional:       Appearance: She is morbidly obese.   Musculoskeletal:      Lumbar back: Spasms and tenderness present. Decreased range of motion. Positive right " straight leg raise test and positive left straight leg raise test.         General: Alert and oriented, No acute distress.  Head: Normocephalic, Atraumatic.  Eye: Pupils are equal, round and reactive to light, Extraocular movements are intact, Sclera non-icteric.  Ears/Nose/Throat: Normal, Mucosa moist,Clear.  Neck/Thyroid: Supple, Non-tender, No carotid bruit, No lymphadenopathy, No JVD, Full range of motion.  Respiratory: Clear to auscultation bilaterally; No wheezes, rales or rhonchi,Non-labored respirations, Symmetrical chest wall expansion.  Cardiovascular: Regular rate and rhythm, S1/S2 normal, No murmurs, rubs or gallops.  Gastrointestinal: Soft, Non-tender, Non-distended, Normal bowel sounds, No palpable organomegaly.  Integumentary: Warm, Dry, Intact, No suspicious lesions or rashes.  Extremities: No clubbing, cyanosis or edema  Neurologic: No focal deficits, Cranial Nerves II-XII are grossly intact, Motor strength normal upper and lower extremities, Sensory exam intact.  Psychiatric: Normal interaction, Coherent speech, Appropriate affect       Protective Sensation (w/ 10 gram monofilament):  Right: Intact  Left: Intact    Visual Inspection:  Normal -  Bilateral    Pedal Pulses:   Right: Present  Left: Present    Posterior Tibialis Pulses:   Right:Present  Left: Present      Results     Lab Results   Component Value Date    WBC 6.6 04/28/2023    HGB 11.8 (L) 04/28/2023    HCT 37.9 04/28/2023     04/28/2023    CHOL 148 01/10/2023    TRIG 104 01/10/2023    ALT 19 04/28/2023    AST 23 04/28/2023     08/08/2023    K 4.8 08/08/2023    CREATININE 1.40 (H) 08/08/2023    BUN 23.3 (H) 08/08/2023    CO2 20 (L) 08/08/2023    TSH 1.073 01/10/2023    HGBA1C 7.0 08/08/2023         Assessment and Plan (including Health Maintenance)     Plan:     1. Type 2 diabetes mellitus with stage 3a chronic kidney disease, with long-term current use of insulin  A1C 7.0 at goal. Previous A1C 7.9.   Continue levemir,  janumet, jardiance  as prescribed.  Follow ADA diet.  Avoid soda, simple sweets, and limit rice/pasta/bread/starches and consume brown options when possible.   Maintain healthy weight with BMI goal <30.   Perform aerobic exercise for 150 minutes per week (or 5 days a week for 30 minutes each day).   Examine feet daily.   Obtain annual dilated eye exam.  Eye exam: 23  Foot exam: 23    - CBC Auto Differential; Future  - Comprehensive Metabolic Panel; Future  - Hemoglobin A1C; Future  - Lipid Panel; Future  - empagliflozin (JARDIANCE) 10 mg tablet; Take 1 tablet (10 mg total) by mouth once daily.  Dispense: 90 tablet; Refill: 3  - LEVEMIR FLEXPEN 100 unit/mL (3 mL) InPn pen; SMARTSI Unit(s) SUB-Q Every Evening  Dispense: 27 mL; Refill: 3  - SITagliptan-metformin (JANUMET) 50-1,000 mg per tablet; Take 1 tablet by mouth 2 (two) times a day.  Dispense: 180 tablet; Refill: 3    2. Primary hypertension  At goal.  Continue vasotec and metoprolol.  Follow a low sodium (less than 2 grams of sodium per day), DASH diet.   Continue medications as prescribed.  Monitor blood pressure and report any consistent values greater than 140/90 and keep a log.  Maintain healthy weight with a BMI goal of <30.   Aerobic exercise for 150 minutes per week (or 5 days a week for 30 minutes each day).    - CBC Auto Differential; Future  - Comprehensive Metabolic Panel; Future  - TSH; Future    3. BMI 40.0-44.9, adult  BMI 43. Goal BMI <30.  Aerobic exercise 150 minutes per week.  Avoid soda, simple sugars, sweets, excessive rice, pasta, potatoes or bread.   Choose brown options when available and portion control.  Limit fast foods and fried foods.   Choose complex carbs in moderation (ex: green, leafy vegetables, beans, oatmeal).  Eat plenty of fresh fruits and vegetables with lean meats daily.   Consider permanent healthy lifestyle changes.      4. JORGE ALBERTO (obstructive sleep apnea)  Reports compliance with wearing BiPAP nightly.  Reports  decreased EDS, snoring and fatigue.  Pt is benefiting from its use.  Expect lifetime use and decreased daytime sleepiness/fatigue.   Avoid excessive alcohol, smoking and overuse of sedatives at bedtime.  Weight management discussed.  Adjust sleep position as needed for increased comfort.      5. Mixed anxiety depressive disorder  Continue cymbalta.  Denies SI/HI.  Read positive daily meditations, avoid negative media, set healthy boundaries.  Exercise daily, keep consistent sleep pattern, eat a healthy diet.  Establish good social support, make changes to reduce stress.  Do not drink alcohol or use illicit drugs.  Reports any symptoms of suicidal/homicidal ideations or self harm immediately, go to nearest emergency room.      6. Encounter for screening for diabetic retinopathy  - Diabetic Eye Screening Photo; Future    7. Screening for malignant neoplasm of colon  - Cologuard Screening (Multitarget Stool DNA); Future  - Cologuard Screening (Multitarget Stool DNA)    8. Encounter for screening mammogram for malignant neoplasm of breast  - Mammo Digital Screening Bilat w/ Jose Juan; Future      Problem List Items Addressed This Visit          Psychiatric    Mixed anxiety depressive disorder (Chronic)       Cardiac/Vascular    Hyperlipidemia LDL goal <70 (Chronic)    Relevant Orders    Lipid Panel    Primary hypertension (Chronic)    Relevant Orders    CBC Auto Differential    Comprehensive Metabolic Panel    TSH       Endocrine    BMI 40.0-44.9, adult (Chronic)    Type 2 diabetes mellitus with stage 3a chronic kidney disease, with long-term current use of insulin (Chronic)    Relevant Medications    empagliflozin (JARDIANCE) 10 mg tablet    LEVEMIR FLEXPEN 100 unit/mL (3 mL) InPn pen    SITagliptan-metformin (JANUMET) 50-1,000 mg per tablet    Other Relevant Orders    CBC Auto Differential    Comprehensive Metabolic Panel    Hemoglobin A1C    Lipid Panel       Other    JORGE ALBERTO (obstructive sleep apnea) (Chronic)     Other  Visit Diagnoses       Encounter for screening for diabetic retinopathy    -  Primary    Relevant Orders    Diabetic Eye Screening Photo    Screening for malignant neoplasm of colon        Relevant Orders    Cologuard Screening (Multitarget Stool DNA)    Encounter for screening mammogram for malignant neoplasm of breast        Relevant Orders    Mammo Digital Screening Bilat w/ Jose Juan              Health Maintenance Due   Topic Date Due    Pneumococcal Vaccines (Age 0-64) (1 - PCV) Never done    Shingles Vaccine (1 of 2) Never done    COVID-19 Vaccine (4 - Pfizer series) 12/15/2021    Colorectal Cancer Screening  04/17/2022    Diabetes Urine Screening  10/11/2022    Eye Exam  04/14/2023    Mammogram  05/16/2023       Follow up in about 6 months (around 2/8/2024) for Follow up with labs one week prior to appt. .        Signature:  ALYX Robison  [facility]  [address] [facility] [address]

## 2023-08-09 RX ORDER — HYDROXYZINE PAMOATE 25 MG/1
CAPSULE ORAL
Qty: 90 CAPSULE | Refills: 3 | Status: SHIPPED | OUTPATIENT
Start: 2023-08-09 | End: 2024-02-19

## 2023-08-14 ENCOUNTER — OFFICE VISIT (OUTPATIENT)
Dept: CARDIOLOGY | Facility: CLINIC | Age: 58
End: 2023-08-14
Payer: MEDICARE

## 2023-08-14 VITALS
HEART RATE: 74 BPM | BODY MASS INDEX: 39.23 KG/M2 | OXYGEN SATURATION: 100 % | SYSTOLIC BLOOD PRESSURE: 114 MMHG | HEIGHT: 62 IN | WEIGHT: 213.19 LBS | RESPIRATION RATE: 18 BRPM | DIASTOLIC BLOOD PRESSURE: 74 MMHG

## 2023-08-14 DIAGNOSIS — R06.09 DOE (DYSPNEA ON EXERTION): ICD-10-CM

## 2023-08-14 DIAGNOSIS — E78.5 HYPERLIPIDEMIA LDL GOAL <70: Primary | ICD-10-CM

## 2023-08-14 DIAGNOSIS — G47.30 SLEEP APNEA, UNSPECIFIED TYPE: ICD-10-CM

## 2023-08-14 DIAGNOSIS — G47.33 OSA (OBSTRUCTIVE SLEEP APNEA): Chronic | ICD-10-CM

## 2023-08-14 DIAGNOSIS — I10 PRIMARY HYPERTENSION: Chronic | ICD-10-CM

## 2023-08-14 PROCEDURE — 93005 ELECTROCARDIOGRAM TRACING: CPT

## 2023-08-14 PROCEDURE — 99215 OFFICE O/P EST HI 40 MIN: CPT | Mod: PBBFAC

## 2023-08-14 RX ORDER — ENALAPRIL MALEATE 2.5 MG/1
2.5 TABLET ORAL DAILY
Qty: 90 TABLET | Refills: 3 | Status: SHIPPED | OUTPATIENT
Start: 2023-08-14 | End: 2024-08-13

## 2023-08-14 RX ORDER — ATORVASTATIN CALCIUM 40 MG/1
40 TABLET, FILM COATED ORAL DAILY
Qty: 90 TABLET | Refills: 3 | Status: SHIPPED | OUTPATIENT
Start: 2023-08-14 | End: 2024-08-13

## 2023-08-14 RX ORDER — METOPROLOL TARTRATE 25 MG/1
25 TABLET, FILM COATED ORAL 2 TIMES DAILY
Qty: 180 TABLET | Refills: 3 | Status: SHIPPED | OUTPATIENT
Start: 2023-08-14 | End: 2024-08-13

## 2023-08-14 NOTE — PATIENT INSTRUCTIONS
EKG today   Patient to complete labs ordered per PCP prior to next office visit   Follow up in cardiology clinic in 6 months or sooner if needed   Follow up with PCP as directed

## 2023-08-23 NOTE — PROGRESS NOTES
Gloria Denis is a 57 y.o. female here for a diabetic eye screening with non-dilated fundus photos per ALYX Foreman.    Patient cooperative?: Yes  Small pupils?: No  Last eye exam: unknown    For exam results, see Encounter Report.

## 2023-08-31 ENCOUNTER — APPOINTMENT (OUTPATIENT)
Dept: RADIOLOGY | Facility: HOSPITAL | Age: 58
End: 2023-08-31
Attending: NURSE PRACTITIONER
Payer: MEDICARE

## 2023-08-31 DIAGNOSIS — M54.42 CHRONIC BILATERAL LOW BACK PAIN WITH BILATERAL SCIATICA: ICD-10-CM

## 2023-08-31 DIAGNOSIS — G89.29 CHRONIC BILATERAL LOW BACK PAIN WITH BILATERAL SCIATICA: ICD-10-CM

## 2023-08-31 DIAGNOSIS — M54.41 CHRONIC BILATERAL LOW BACK PAIN WITH BILATERAL SCIATICA: ICD-10-CM

## 2023-08-31 DIAGNOSIS — M54.9 DORSALGIA, UNSPECIFIED: ICD-10-CM

## 2023-08-31 PROCEDURE — 72148 MRI LUMBAR SPINE W/O DYE: CPT | Mod: TC

## 2023-09-05 ENCOUNTER — TELEPHONE (OUTPATIENT)
Dept: INTERNAL MEDICINE | Facility: CLINIC | Age: 58
End: 2023-09-05
Payer: MEDICARE

## 2023-09-05 NOTE — TELEPHONE ENCOUNTER
Please contact the pt and schedule a virtual visit to discuss MRI results and possible treatment options. Thanks

## 2023-09-12 ENCOUNTER — OFFICE VISIT (OUTPATIENT)
Dept: NEPHROLOGY | Facility: CLINIC | Age: 58
End: 2023-09-12
Payer: MEDICARE

## 2023-09-12 VITALS
BODY MASS INDEX: 39.93 KG/M2 | RESPIRATION RATE: 18 BRPM | HEART RATE: 84 BPM | DIASTOLIC BLOOD PRESSURE: 79 MMHG | SYSTOLIC BLOOD PRESSURE: 118 MMHG | HEIGHT: 62 IN | OXYGEN SATURATION: 100 % | TEMPERATURE: 98 F | WEIGHT: 217 LBS

## 2023-09-12 DIAGNOSIS — R21 RASH: ICD-10-CM

## 2023-09-12 DIAGNOSIS — N18.31 CKD STAGE G3A/A2, GFR 45-59 AND ALBUMIN CREATININE RATIO 30-299 MG/G: Primary | ICD-10-CM

## 2023-09-12 DIAGNOSIS — L73.9 FOLLICULITIS: ICD-10-CM

## 2023-09-12 DIAGNOSIS — Z91.89 AT HIGH RISK FOR HYPERKALEMIA: ICD-10-CM

## 2023-09-12 DIAGNOSIS — I10 PRIMARY HYPERTENSION: Chronic | ICD-10-CM

## 2023-09-12 DIAGNOSIS — E87.20 METABOLIC ACIDOSIS: ICD-10-CM

## 2023-09-12 LAB — NONINV COLON CA DNA+OCC BLD SCRN STL QL: NEGATIVE

## 2023-09-12 PROCEDURE — 3008F BODY MASS INDEX DOCD: CPT | Mod: CPTII,,, | Performed by: NURSE PRACTITIONER

## 2023-09-12 PROCEDURE — 99214 OFFICE O/P EST MOD 30 MIN: CPT | Mod: S$PBB,,, | Performed by: NURSE PRACTITIONER

## 2023-09-12 PROCEDURE — 4010F PR ACE/ARB THEARPY RXD/TAKEN: ICD-10-PCS | Mod: CPTII,,, | Performed by: NURSE PRACTITIONER

## 2023-09-12 PROCEDURE — 1160F RVW MEDS BY RX/DR IN RCRD: CPT | Mod: CPTII,,, | Performed by: NURSE PRACTITIONER

## 2023-09-12 PROCEDURE — 3078F DIAST BP <80 MM HG: CPT | Mod: CPTII,,, | Performed by: NURSE PRACTITIONER

## 2023-09-12 PROCEDURE — 1159F PR MEDICATION LIST DOCUMENTED IN MEDICAL RECORD: ICD-10-PCS | Mod: CPTII,,, | Performed by: NURSE PRACTITIONER

## 2023-09-12 PROCEDURE — 3051F HG A1C>EQUAL 7.0%<8.0%: CPT | Mod: CPTII,,, | Performed by: NURSE PRACTITIONER

## 2023-09-12 PROCEDURE — 3074F PR MOST RECENT SYSTOLIC BLOOD PRESSURE < 130 MM HG: ICD-10-PCS | Mod: CPTII,,, | Performed by: NURSE PRACTITIONER

## 2023-09-12 PROCEDURE — 99214 PR OFFICE/OUTPT VISIT, EST, LEVL IV, 30-39 MIN: ICD-10-PCS | Mod: S$PBB,,, | Performed by: NURSE PRACTITIONER

## 2023-09-12 PROCEDURE — 3066F PR DOCUMENTATION OF TREATMENT FOR NEPHROPATHY: ICD-10-PCS | Mod: CPTII,,, | Performed by: NURSE PRACTITIONER

## 2023-09-12 PROCEDURE — 3008F PR BODY MASS INDEX (BMI) DOCUMENTED: ICD-10-PCS | Mod: CPTII,,, | Performed by: NURSE PRACTITIONER

## 2023-09-12 PROCEDURE — 99215 OFFICE O/P EST HI 40 MIN: CPT | Mod: PBBFAC | Performed by: NURSE PRACTITIONER

## 2023-09-12 PROCEDURE — 4010F ACE/ARB THERAPY RXD/TAKEN: CPT | Mod: CPTII,,, | Performed by: NURSE PRACTITIONER

## 2023-09-12 PROCEDURE — 3066F NEPHROPATHY DOC TX: CPT | Mod: CPTII,,, | Performed by: NURSE PRACTITIONER

## 2023-09-12 PROCEDURE — 1159F MED LIST DOCD IN RCRD: CPT | Mod: CPTII,,, | Performed by: NURSE PRACTITIONER

## 2023-09-12 PROCEDURE — 1160F PR REVIEW ALL MEDS BY PRESCRIBER/CLIN PHARMACIST DOCUMENTED: ICD-10-PCS | Mod: CPTII,,, | Performed by: NURSE PRACTITIONER

## 2023-09-12 PROCEDURE — 3051F PR MOST RECENT HEMOGLOBIN A1C LEVEL 7.0 - < 8.0%: ICD-10-PCS | Mod: CPTII,,, | Performed by: NURSE PRACTITIONER

## 2023-09-12 PROCEDURE — 3078F PR MOST RECENT DIASTOLIC BLOOD PRESSURE < 80 MM HG: ICD-10-PCS | Mod: CPTII,,, | Performed by: NURSE PRACTITIONER

## 2023-09-12 PROCEDURE — 3074F SYST BP LT 130 MM HG: CPT | Mod: CPTII,,, | Performed by: NURSE PRACTITIONER

## 2023-09-12 RX ORDER — MUPIROCIN 20 MG/G
OINTMENT TOPICAL 2 TIMES DAILY
Qty: 15 G | Refills: 0 | Status: SHIPPED | OUTPATIENT
Start: 2023-09-12 | End: 2023-09-19

## 2023-09-12 RX ORDER — KETOCONAZOLE 20 MG/G
CREAM TOPICAL 2 TIMES DAILY
Qty: 60 G | Refills: 2 | Status: SHIPPED | OUTPATIENT
Start: 2023-09-12 | End: 2023-10-10 | Stop reason: SDUPTHER

## 2023-09-12 NOTE — PROGRESS NOTES
[facility]  Nephrology Clinic Note    Chief complaint: Chronic Kidney Disease    History of present illness:   Gloria Denis is a 57 y.o. Black or  female with past medical history of chronic kidney disease, persistent hyperkalemia, diabetes mellitus type 2 (diagnosed in her late 30s), hypertension, dyslipidemia, sleep apnea, anxiety, depression, chronic back pain, GERD, chronic constipation and morbid obesity. Patient presents for follow-up appointment in nephrology clinic.  Complains of rash to area under the breast and the bottom of the pannus, additionally reports pimple-like rash to left forehead.    Review of Systems  12 point review of systems conducted, negative except as stated in the history of present illness.    Allergies: Patient is allergic to aspirin.     Past Medical History:  has a past medical history of Anxiety disorder, unspecified, Chronic constipation, CKD (chronic kidney disease), Depression, DM (diabetes mellitus), Dyslipidemia, GERD (gastroesophageal reflux disease), HTN (hypertension), Irritable bowel syndrome, Morbid obesity, and Sleep apnea, unspecified.    Procedure History:  has a past surgical history that includes  section; Cholecystectomy; Arthroscopic removal of loose body from joint; Drainage of oral abscess; Exploration using laparoscope; Upper gastrointestinal endoscopy; and Abscess drainage.    Family History: family history includes Cancer in her brother and mother; Diabetes in her father; Heart attack in her father; Heart disease in her mother; Kidney failure in her father; Rheum arthritis in her mother; Stroke in her father.    Social History:  reports that she has never smoked. She has never been exposed to tobacco smoke. She has never used smokeless tobacco. She reports that she does not drink alcohol and does not use drugs.    Physical exam  /79 (BP Location: Left arm, Patient Position: Sitting, BP  "Method: Medium (Manual))   Pulse 84   Temp 98.1 °F (36.7 °C) (Oral)   Resp 18   Ht 5' 2.21" (1.58 m)   Wt 98.4 kg (217 lb)   SpO2 100%   BMI 39.42 kg/m²   General appearance: Patient is in no acute distress.  Skin: small pustule to left forehead. Macerated skin to the bottom of the pannus and under the breasts  HEENT: PERRLA, EOMI, no scleral icterus, no JVD. Neck is supple.  Chest: Respirations are unlabored. Lungs sounds are clear.   Heart: S1, S2.   Abdomen: Benign. Obese  : Deferred.  Extremities: No edema, peripheral pulses are palpable.   Neuro: No focal deficits.     Home Medications:    Current Outpatient Medications:     atorvastatin (LIPITOR) 40 MG tablet, Take 1 tablet (40 mg total) by mouth once daily., Disp: 90 tablet, Rfl: 3    baclofen (LIORESAL) 10 MG tablet, Take 1 tablet (10 mg total) by mouth 2 (two) times daily as needed (muscle spasm or pain)., Disp: 30 tablet, Rfl: 1    BD CAYLA 2ND GEN PEN NEEDLE 32 gauge x 5/32" Ndle, SMARTSIG:Injection Every Night, Disp: 100 each, Rfl: 2    betamethasone dipropionate 0.05 % cream,  See Instructions, APPLY  CREAM TOPICALLY TWICE DAILY, # 60 gm, 0 Refill(s), Pharmacy: [id number], 155, cm, Height/Length Dosing, 07/08/21 8:39:00 CDT, 112, kg, Weight Dosing, 07/08/21 11:11:00 CDT, Disp: , Rfl:     blood sugar diagnostic Strp, To check BG 2 times daily, to use with insurance preferred meter, Disp: 100 each, Rfl: 3    blood-glucose meter kit, To check BG 2 times daily, to use with insurance preferred meter, Disp: 1 each, Rfl: 0    diclofenac sodium (VOLTAREN) 1 % Gel, Apply 2 g topically 2 (two) times daily as needed (pain)., Disp: 100 g, Rfl: 3    DULoxetine (CYMBALTA) 60 MG capsule, Take 1 capsule (60 mg total) by mouth every evening., Disp: 90 capsule, Rfl: 3    empagliflozin (JARDIANCE) 10 mg tablet, Take 1 tablet (10 mg total) by mouth once daily., Disp: 90 tablet, Rfl: 3    enalapril (VASOTEC) 2.5 MG tablet, Take 1 tablet (2.5 mg total) by " mouth once daily., Disp: 90 tablet, Rfl: 3    hydrOXYzine pamoate (VISTARIL) 25 MG Cap, TAKE 1 CAPSULE BY MOUTH THREE TIMES DAILY AS NEEDED FOR ANXIETY, Disp: 90 capsule, Rfl: 3    ketoconazole (NIZORAL) 2 % cream, SMARTSIG:Sparingly Topical Twice Daily, Disp: , Rfl:     lancets Misc, To check BG 2 times daily, to use with insurance preferred meter, Disp: 100 each, Rfl: 3    LEVEMIR FLEXPEN 100 unit/mL (3 mL) InPn pen, SMARTSI Unit(s) SUB-Q Every Evening, Disp: 27 mL, Rfl: 3    LINZESS 290 mcg Cap capsule, Take 1 capsule (290 mcg total) by mouth once daily., Disp: 90 capsule, Rfl: 3    LOKELMA 10 gram packet, SMARTSI Packet(s) By Mouth 3 Times a Week, Disp: 45 packet, Rfl: 0    metoprolol tartrate (LOPRESSOR) 25 MG tablet, Take 1 tablet (25 mg total) by mouth 2 (two) times daily., Disp: 180 tablet, Rfl: 3    SITagliptan-metformin (JANUMET) 50-1,000 mg per tablet, Take 1 tablet by mouth 2 (two) times a day., Disp: 180 tablet, Rfl: 3    sodium bicarbonate 650 MG tablet, Take 1,300 mg by mouth Daily., Disp: , Rfl:     ketoconazole (NIZORAL) 2 % cream, Apply topically 2 (two) times daily. Apply a thin layer to stomach/breast area for 14 days, Disp: 60 g, Rfl: 2    mupirocin (BACTROBAN) 2 % ointment, Apply topically 2 (two) times daily. Apply a thin layer to forehead for 7 days, Disp: 15 g, Rfl: 0    nystatin (MYCOSTATIN) powder, SMARTSI Gram(s) Topical Twice Daily, Disp: , Rfl:     omeprazole (PRILOSEC) 40 MG capsule, Take 1 capsule (40 mg total) by mouth once daily. (Patient not taking: Reported on 2023), Disp: 30 capsule, Rfl: 5    Laboratory data    Serum  Lab Results   Component Value Date    WBC 6.6 2023    HGB 11.8 (L) 2023    HCT 37.9 2023     2023     2023    K 4.5 2023    CHLORIDE 110 (H) 2023    CO2 20 (L) 2023    BUN 25.1 (H) 2023    CREATININE 1.36 (H) 2023    EGFRNORACEVR 46 2023    GLUCOSE 144 (H) 2023     CALCIUM 9.4 09/12/2023    ALKPHOS 75 09/12/2023    LABPROT 7.4 09/12/2023    ALBUMIN 3.5 09/12/2023    BILIDIR 0.2 01/24/2022    IBILI 0.30 01/24/2022    AST 20 09/12/2023    ALT 12 09/12/2023    PHOS 4.0 09/12/2023      Lab Results   Component Value Date    HGBA1C 7.0 08/08/2023    .8 (H) 09/12/2023    HIV Nonreactive 05/17/2017    HEPCAB Reactive (A) 05/17/2017    HEPBSURFAG Negative 05/17/2017     Urine:  Lab Results   Component Value Date    COLORUA Light-Yellow 09/12/2023    APPEARANCEUA Clear 09/12/2023    SGUA 1.021 09/12/2023    PHUA 5.5 09/12/2023    PROTEINUA Trace (A) 09/12/2023    GLUCOSEUA 4+ (A) 09/12/2023    KETONESUA Negative 09/12/2023    BLOODUA Negative 09/12/2023    NITRITESUA Negative 09/12/2023    LEUKOCYTESUR Negative 09/12/2023    RBCUA None Seen 09/12/2023    WBCUA 0-5 09/12/2023    BACTERIA Trace (A) 09/12/2023    SQEPUA Occ (A) 09/12/2023    HYALINECASTS None Seen 09/12/2023    CREATRANDUR 102.1 09/12/2023    PROTEINURINE 18.7 09/12/2023    UPROTCREA 0.2 09/12/2023         Imaging  US Retroperitoneal Limited 05/17/2021  The bilateral kidneys demonstrate parenchymal thinning. The right kidney measures 10.5 x 4.1 x 4.3 cm and is otherwise unremarkable. There is no right hydronephrosis. The left kidney measures 10.5 x 4.2 x 5.2 cm and is otherwise unremarkable. There is no left hydronephrosis.  IMPRESSION:  1. Bilateral medical renal disease.  Electronically Signed By: Shai Ga MD Date/Time Signed: 05/17/2021 14:05      Impression and plan     CKD stage G3a/A2, GFR 45-59 and albumin creatinine ratio  mg/g    -     Comprehensive Metabolic Panel; Future; Expected date: 03/02/2024  -     Protein/Creatinine Ratio, Urine; Future; Expected date: 03/02/2024  -     Urinalysis, Reflex to Urine Culture; Future; Expected date: 03/02/2024    Possibly diabetic kidney disease.  Serum creatinine is stable, there is no significant proteinuria, imaging of the kidneys was essentially  unremarkable. Continue risk factor management. Continue enalapril with close monitoring of serum potassium. Importance of taking all medications exactly as prescribed, especially Lokelma, discussed with the patient at length. Continue SGLT2 inhibitor therapy as well.  Continue renal sparing activities:    -Follow low sodium diet (2 grams a day)  -Control diabetes (goal A1C <7.0%)  -Control high blood pressure ( goal BP < 130/80, please record BP at home every day and bring log to next office visit)  -Exercise at least 30 minutes a day, 5 days a week.  -Maintain healthy weight.  -Decrease or stop alcohol use  -Do not smoke  -Stay well hydrated  -Receive Pneumovax, Flu, and HBV vaccines if indicated.  -Do not take NSAIDs (Ibuprofen, Naproxen, Aleve, Advil, Toradol, Mobic), may take only Tylenol as needed for pain/headaches.  -Take cholesterol-lowering medications as prescribed (LDL goal <100)  Follow up in about 6 months (around 3/12/2024).    Primary hypertension  Lifestyle and dietary interventions discussed, patient encouraged to maintain non-sedentary lifestyle and well-balanced diet.     At high risk for hyperkalemia  Continue Lokelma as prescribed.      Metabolic acidosis  Continue sodium bicarbonate supplementation.    BMI 38.0-38.9,adult  Lifestyle and dietary interventions discussed, patient encouraged to maintain non-sedentary lifestyle and well-balanced diet.      Folliculitis  -     mupirocin (BACTROBAN) 2 % ointment; Apply topically 2 (two) times daily. Apply a thin layer to forehead for 7 days  Dispense: 15 g; Refill: 0    Rash  -     ketoconazole (NIZORAL) 2 % cream; Apply topically 2 (two) times daily. Apply a thin layer to stomach/breast area for 14 days  Dispense: 60 g; Refill: 2        9/12/2023  Charleen Pineda NP  [facility]

## 2023-09-21 ENCOUNTER — PATIENT MESSAGE (OUTPATIENT)
Dept: INTERNAL MEDICINE | Facility: CLINIC | Age: 58
End: 2023-09-21
Payer: MEDICARE

## 2023-09-27 ENCOUNTER — PATIENT OUTREACH (OUTPATIENT)
Dept: ADMINISTRATIVE | Facility: HOSPITAL | Age: 58
End: 2023-09-27
Payer: MEDICARE

## 2023-09-27 ENCOUNTER — PATIENT MESSAGE (OUTPATIENT)
Dept: ADMINISTRATIVE | Facility: HOSPITAL | Age: 58
End: 2023-09-27
Payer: MEDICARE

## 2023-09-27 NOTE — PROGRESS NOTES
Pt cancelled appt for mammogram on 9/1/2023. Reminder message sent to pt via portal to reschedule appointment. Phone number provided to pt. [phone].

## 2023-09-28 ENCOUNTER — OFFICE VISIT (OUTPATIENT)
Dept: INTERNAL MEDICINE | Facility: CLINIC | Age: 58
End: 2023-09-28
Payer: MEDICARE

## 2023-09-28 DIAGNOSIS — G89.29 CHRONIC RIGHT SHOULDER PAIN: Primary | ICD-10-CM

## 2023-09-28 DIAGNOSIS — M48.00 CENTRAL STENOSIS OF SPINAL CANAL: Chronic | ICD-10-CM

## 2023-09-28 DIAGNOSIS — G89.29 CHRONIC LEFT SHOULDER PAIN: Chronic | ICD-10-CM

## 2023-09-28 DIAGNOSIS — M25.511 CHRONIC RIGHT SHOULDER PAIN: Primary | ICD-10-CM

## 2023-09-28 DIAGNOSIS — M51.36 DDD (DEGENERATIVE DISC DISEASE), LUMBAR: Chronic | ICD-10-CM

## 2023-09-28 DIAGNOSIS — G63 POLYNEUROPATHY ASSOCIATED WITH UNDERLYING DISEASE: ICD-10-CM

## 2023-09-28 DIAGNOSIS — E66.01 MORBID (SEVERE) OBESITY DUE TO EXCESS CALORIES: ICD-10-CM

## 2023-09-28 DIAGNOSIS — M25.512 CHRONIC LEFT SHOULDER PAIN: Chronic | ICD-10-CM

## 2023-09-28 PROBLEM — M51.369 DDD (DEGENERATIVE DISC DISEASE), LUMBAR: Chronic | Status: ACTIVE | Noted: 2023-09-28

## 2023-09-28 PROCEDURE — 3051F PR MOST RECENT HEMOGLOBIN A1C LEVEL 7.0 - < 8.0%: ICD-10-PCS | Mod: CPTII,95,, | Performed by: NURSE PRACTITIONER

## 2023-09-28 PROCEDURE — 1160F PR REVIEW ALL MEDS BY PRESCRIBER/CLIN PHARMACIST DOCUMENTED: ICD-10-PCS | Mod: CPTII,95,, | Performed by: NURSE PRACTITIONER

## 2023-09-28 PROCEDURE — 1159F PR MEDICATION LIST DOCUMENTED IN MEDICAL RECORD: ICD-10-PCS | Mod: CPTII,95,, | Performed by: NURSE PRACTITIONER

## 2023-09-28 PROCEDURE — 99214 OFFICE O/P EST MOD 30 MIN: CPT | Mod: 95,,, | Performed by: NURSE PRACTITIONER

## 2023-09-28 PROCEDURE — 4010F ACE/ARB THERAPY RXD/TAKEN: CPT | Mod: CPTII,95,, | Performed by: NURSE PRACTITIONER

## 2023-09-28 PROCEDURE — 4010F PR ACE/ARB THEARPY RXD/TAKEN: ICD-10-PCS | Mod: CPTII,95,, | Performed by: NURSE PRACTITIONER

## 2023-09-28 PROCEDURE — 3051F HG A1C>EQUAL 7.0%<8.0%: CPT | Mod: CPTII,95,, | Performed by: NURSE PRACTITIONER

## 2023-09-28 PROCEDURE — 3066F PR DOCUMENTATION OF TREATMENT FOR NEPHROPATHY: ICD-10-PCS | Mod: CPTII,95,, | Performed by: NURSE PRACTITIONER

## 2023-09-28 PROCEDURE — 1159F MED LIST DOCD IN RCRD: CPT | Mod: CPTII,95,, | Performed by: NURSE PRACTITIONER

## 2023-09-28 PROCEDURE — 3066F NEPHROPATHY DOC TX: CPT | Mod: CPTII,95,, | Performed by: NURSE PRACTITIONER

## 2023-09-28 PROCEDURE — 1160F RVW MEDS BY RX/DR IN RCRD: CPT | Mod: CPTII,95,, | Performed by: NURSE PRACTITIONER

## 2023-09-28 PROCEDURE — 99214 PR OFFICE/OUTPT VISIT, EST, LEVL IV, 30-39 MIN: ICD-10-PCS | Mod: 95,,, | Performed by: NURSE PRACTITIONER

## 2023-09-28 RX ORDER — BACLOFEN 10 MG/1
10 TABLET ORAL 2 TIMES DAILY PRN
Qty: 30 TABLET | Refills: 1 | Status: SHIPPED | OUTPATIENT
Start: 2023-09-28 | End: 2024-02-15

## 2023-09-28 NOTE — PROGRESS NOTES
The patient location is: at home  The chief complaint leading to consultation is: MRI results and right shoulder pain    Visit type: audiovisual    Face to Face time with patient: 5 mins and connection lost. Completed visit on audio only after pt unable to reconnect    32 minutes of total time spent on the encounter, which includes face to face time and non-face to face time preparing to see the patient (eg, review of tests), Obtaining and/or reviewing separately obtained history, Documenting clinical information in the electronic or other health record, Independently interpreting results (not separately reported) and communicating results to the patient/family/caregiver, or Care coordination (not separately reported).         Each patient to whom he or she provides medical services by telemedicine is:  (1) informed of the relationship between the physician and patient and the respective role of any other health care provider with respect to management of the patient; and (2) notified that he or she may decline to receive medical services by telemedicine and may withdraw from such care at any time.    Billing Provider: ALYX Robison  Level of Service: SC OFFICE/OUTPT VISIT, EST, LEVL IV, 30-39 MIN  Patient PCP Information       Provider PCP Type    ALYX Robison General            Reason for Visit / Chief Complaint: Follow-up (Audio only, results,c/o L shoulder pain)       History of Present Illness / Problem Focused Workflow     Gloriakatlyn Denis is a 57 y.o. Black or  female for LBP f/u and MRI results. PMH HTN, dyslipidemia, JORGE ALBERTO on Bipap, DM, DM neuropathy, depression, anxiety, CKD, hyperkalemia, L NPDR, GERD, chronic constipation, morbid obesity, atrophic vaginitis, Yolanda-Yolanda dz, and chronic back pain. Followed by [facility] cardio, GYN, renal and GI clinics.    Since last visit, pt attended cardio and renal visits. Was attending PT with some improvement in back pain but  was unable to complete d/t car broken. She has since fixed and will call to resume. Continues to report daily, achy, throbbing, shooting LBP. Continues to use cane while ambulating. Denies any recent falls. MRI results discussed with pt; questions answered. States had seen Dr. Dominguez in the past; does not desire to see again. Will call with r/s [facility] since has transportation. Reports worsening aching right shoulder pain, onset several months, that now she cannot lift above neck height and has to lift with other arm. Pain is worse at night and she cannot lie on that side. Amendable to PT for shoulder when resumes back therapy. Would like to see ortho in [place] or [place] if available d/t distance. Denies chest pain, shortness of breath, cough, fever, headache, dizziness, abdominal pain, nausea, vomiting, diarrhea, constipation, dysuria, depression, anxiety, SI/HI.      Review of Systems     Review of Systems   Constitutional:  Negative for activity change and unexpected weight change.   HENT:  Negative for hearing loss, rhinorrhea and trouble swallowing.    Eyes:  Negative for discharge and visual disturbance.   Respiratory:  Negative for chest tightness and wheezing.    Cardiovascular:  Negative for chest pain and palpitations.   Gastrointestinal:  Negative for blood in stool, constipation, diarrhea and vomiting.   Endocrine: Negative for polydipsia and polyuria.   Genitourinary:  Negative for difficulty urinating, dysuria, hematuria and menstrual problem.   Musculoskeletal:  Positive for arthralgias and neck pain. Negative for joint swelling.   Neurological:  Negative for weakness and headaches.   Psychiatric/Behavioral:  Negative for confusion and dysphoric mood.         See HPI for details    Constitutional: Denies Change in appetite. Denies Chills. Denies Fever. Denies Night sweats.  Eye: Denies Blurred vision. Denies Discharge. Denies Eye pain.  ENT: Denies Decreased hearing. Denies Sore throat. Denies  Swollen glands.  Respiratory: Denies Cough. Denies Shortness of breath. Denies Shortness of breath with exertion. Denies Wheezing.  Cardiovascular: DeniesChest pain at rest. Denies Chest pain with exertion. Denies Irregular heartbeat. Denies Palpitations. Denies Edema.  Gastrointestinal: Denies Abdominal pain. Denies Diarrhea. Denies Nausea. Denies Vomiting. Denies Hematemesis or Hematochezia.  Genitourinary: Denies Dysuria. Denies Urinary frequency. Denies Urinary urgency. Denies Blood in urine.  Endocrine: Denies Cold intolerance. Denies Excessive thirst. Denies Heat intolerance. Denies Weight loss. Denies Weight gain.  Musculoskeletal: Admits Painful joints. Denies Weakness.  Integumentary: Denies Rash. Denies Itching. Denies Dry skin.  Neurologic: Denies Dizziness. Denies Fainting. Denies Headache.  Psychiatric: Denies Depression. Denies Anxiety. Denies Suicidal/Homicidal ideations.    All Other ROS: Negative except as stated in HPI.     Medical / Social / Family History     ----------------------------  Anxiety disorder, unspecified  Chronic constipation  CKD (chronic kidney disease)  Depression  DM (diabetes mellitus)  Dyslipidemia  GERD (gastroesophageal reflux disease)  HTN (hypertension)  Irritable bowel syndrome  Morbid obesity  Sleep apnea, unspecified     Past Surgical History:   Procedure Laterality Date    ABSCESS DRAINAGE      I don't remember the date    ARTHROSCOPIC REMOVAL OF LOOSE BODY FROM JOINT       SECTION      CHOLECYSTECTOMY      Drainage of oral abscess      Exploration using laparoscope      UPPER GASTROINTESTINAL ENDOSCOPY         Social History     Socioeconomic History    Marital status:      Spouse name: Mchenry   Tobacco Use    Smoking status: Never     Passive exposure: Never    Smokeless tobacco: Never   Substance and Sexual Activity    Alcohol use: Never    Drug use: Never    Sexual activity: Not Currently     Partners: Male     Birth control/protection: None      Social Determinants of Health     Financial Resource Strain: Low Risk  (1/9/2023)    Overall Financial Resource Strain (CARDIA)     Difficulty of Paying Living Expenses: Not very hard   Food Insecurity: No Food Insecurity (1/9/2023)    Hunger Vital Sign     Worried About Running Out of Food in the Last Year: Never true     Ran Out of Food in the Last Year: Never true   Transportation Needs: No Transportation Needs (1/9/2023)    PRAPARE - Transportation     Lack of Transportation (Medical): No     Lack of Transportation (Non-Medical): No   Physical Activity: Inactive (1/9/2023)    Exercise Vital Sign     Days of Exercise per Week: 0 days     Minutes of Exercise per Session: 0 min   Stress: Stress Concern Present (1/9/2023)    [employer] [place] [employer] - Occupational Stress Questionnaire     Feeling of Stress : To some extent   Social Connections: Moderately Isolated (1/9/2023)    Social Connection and Isolation Panel [NHANES]     Frequency of Communication with Friends and Family: More than three times a week     Frequency of Social Gatherings with Friends and Family: More than three times a week     Attends Yarsanism Services: More than 4 times per year     Active Member of Clubs or Organizations: No     Attends Club or Organization Meetings: Never     Marital Status:    Housing Stability: Low Risk  (1/9/2023)    Housing Stability Vital Sign     Unable to Pay for Housing in the Last Year: No     Number of Places Lived in the Last Year: 1     Unstable Housing in the Last Year: No        Family History   Problem Relation Age of Onset    Cancer Mother     Heart disease Mother     Rheum arthritis Mother     Diabetes Father     Heart attack Father     Kidney failure Father     Stroke Father     Cancer Brother         Medications and Allergies     Medications  Current Outpatient Medications   Medication Instructions    atorvastatin (LIPITOR) 40 mg, Oral, Daily    baclofen (LIORESAL) 10 mg, Oral,  "2 times daily PRN    BD CAYLA 2ND GEN PEN NEEDLE 32 gauge x 5/32" Ndle SMARTSIG:Injection Every Night    betamethasone dipropionate 0.05 % cream   See Instructions, APPLY  CREAM TOPICALLY TWICE DAILY, # 60 gm, 0 Refill(s), Pharmacy: [id number], 155, cm, Height/Length Dosing, 21 8:39:00 CDT, 112, kg, Weight Dosing, 21 11:11:00 CDT    blood sugar diagnostic Strp To check BG 2 times daily, to use with insurance preferred meter    blood-glucose meter kit To check BG 2 times daily, to use with insurance preferred meter    diclofenac sodium (VOLTAREN) 2 g, Topical (Top), 2 times daily PRN    DULoxetine (CYMBALTA) 60 mg, Oral, Nightly    empagliflozin (JARDIANCE) 10 mg, Oral, Daily    enalapril (VASOTEC) 2.5 mg, Oral, Daily    hydrOXYzine pamoate (VISTARIL) 25 MG Cap TAKE 1 CAPSULE BY MOUTH THREE TIMES DAILY AS NEEDED FOR ANXIETY    ketoconazole (NIZORAL) 2 % cream SMARTSIG:Sparingly Topical Twice Daily    lancets Misc To check BG 2 times daily, to use with insurance preferred meter    LEVEMIR FLEXPEN 100 unit/mL (3 mL) InPn pen SMARTSI Unit(s) SUB-Q Every Evening    LINZESS 290 mcg, Oral, Daily    LOKELMA 10 gram packet SMARTSI Packet(s) By Mouth 3 Times a Week    metoprolol tartrate (LOPRESSOR) 25 mg, Oral, 2 times daily    nystatin (MYCOSTATIN) powder SMARTSI Gram(s) Topical Twice Daily    omeprazole (PRILOSEC) 40 mg, Oral, Daily    SITagliptan-metformin (JANUMET) 50-1,000 mg per tablet 1 tablet, Oral, 2 times daily    sodium bicarbonate 1,300 mg, Oral, Daily         Allergies  Review of patient's allergies indicates:   Allergen Reactions    Aspirin        Physical Examination   Vital Signs  Pain Score:   2 (3)  Pain Loc: Shoulder (left)]    Physical Exam  Constitutional:       Appearance: She is obese.   Neurological:      Mental Status: She is alert and oriented to person, place, and time.   Psychiatric:         Mood and Affect: Mood normal.         Speech: Speech normal.         " Behavior: Behavior normal. Behavior is cooperative.         Thought Content: Thought content normal.         Judgment: Judgment normal.         Results     Lab Results   Component Value Date    WBC 6.6 04/28/2023    HGB 11.8 (L) 04/28/2023    HCT 37.9 04/28/2023     04/28/2023    CHOL 148 01/10/2023    TRIG 104 01/10/2023    ALT 12 09/12/2023    AST 20 09/12/2023     09/12/2023    K 4.5 09/12/2023    CREATININE 1.36 (H) 09/12/2023    BUN 25.1 (H) 09/12/2023    CO2 20 (L) 09/12/2023    TSH 1.073 01/10/2023    HGBA1C 7.0 08/08/2023     Details    Reading Physician Reading Date Result Priority   Srinivas Robledo MD  [phone] 8/31/2023 Routine     Narrative & Impression  EXAMINATION:  MRI LUMBAR SPINE WITHOUT CONTRAST     TECHNIQUE:  Low back pain, symptoms persist with > 6wks conservative treatment;Dorsalgia, unspecified     COMPARISON:  CT of the lumbar spine June 12, 2020.     FINDINGS:  For the purpose of this report, the most inferior well developed intervertebral disc space is presumed to represent L5-S1.  There is grade 1 anterolisthesis of L4 on L5 with interval progression.  Left unilateral sacralization of L5.  There is similar irregular sclerosis along the inferior endplate of T11.  There are no acute marrow edematous signals.  The visualized thoracic cord is unremarkable. The conus medullaris terminates at T12-L1.  Disc segmental analysis is given below:     At T10-T11, there is disc bulge which indents the ventral thecal sac without cord compression.     At L1-L2, disc height is preserved.  Central canal is not stenosed and there are no narrowings of the neural foramen.     At L2-L3, disc height is preserved.  There is left facet arthropathy without significant central canal stenosis.  Right neural foramen is unremarkable.  There is mild spondylotic narrowing of the left neural foramen caused by facet arthropathy.     At L3-L4, disc height is preserved.  There is bilateral facet arthropathy  which is more pronounced on the left.  This results in mild central canal stenosis.  There are no significant narrowings of the neural foramen.     At L4-L5, there is grade 1 anterolisthesis of L4 on L5.  This results in partial unroofing of the posterior disc material.  There is broad disc protrusion which compresses the ventral thecal sac.  Portion of the disc also migrates posterior to L4 vertebral body.  Central canal stenosis is marked and is also caused by facets arthropathy and ligamentum flavum thickening.  Bilateral narrowings of the lateral recesses with some compression upon the exiting nerve roots.  There are no significant narrowings of the neural foramen.     At L5-S1, disc height is preserved.  Bilateral facet arthropathy without significant central canal stenosis.  Bilateral neural foramen are patent.     Impression:     Lumbar degenerative disc disease and spondylosis level by level discussed above.        Electronically signed by: Srinivas Robledo  Date:                                            08/31/2023  Time:                                           13:45                Component Ref Range & Units 3 wk ago   Cologuard Result Negative Negative        Assessment and Plan (including Health Maintenance)     Plan:     1. DDD (degenerative disc disease), lumbar  Referral to PT to resume therapy.  Referral to Dr. Shahid Cannon in [place] to eval/treat.  Fall precautions; continue cane with ambulation.  Refilled baclofen prn.  Perform back stretches daily.   Avoid activities than increase back pain or stiffness.   Apply heating pad, ice pack, and BioFreeze as needed; alternate every 15-20 minutes.   Massage back to loosen muscles.   Continue tylenol arthritis/muscle relaxer as needed.    - Ambulatory referral/consult to Physical/Occupational Therapy; Future    2. Central stenosis of spinal canal  Referral to PT to resume therapy.  Referral to Dr. Shahid Cannon in [place] to eval/treat.  Fall  precautions; continue cane with ambulation.  Perform back stretches daily.   Avoid activities than increase back pain or stiffness.   Apply heating pad, ice pack, and BioFreeze as needed; alternate every 15-20 minutes.   Massage back to loosen muscles.   Continue tylenol arthritis/muscle relaxer as needed.  - Ambulatory referral/consult to Physical/Occupational Therapy; Future    3. Chronic left shoulder pain  XR right shoulder ordered.  Will complete at [facility].  Referral to Dr. Shahid Cannon in [place] to eval/treat.  Referral to PT to eval/treat.  Perform shoulder stretches daily.   Avoid activities than increase shoulder pain or stiffness.   Apply heating pad, ice pack, and BioFreeze as needed; alternate every 15-20 minutes.   Massage neck to loosen shoulder muscles.   Continue tylenol arthritis/muscle relaxer as needed.    - X-ray Shoulder 2 or More Views Left; Future  - Ambulatory referral/consult to Physical/Occupational Therapy; Future        Problem List Items Addressed This Visit          Neuro    Polyneuropathy associated with underlying disease (Chronic)    DDD (degenerative disc disease), lumbar (Chronic)    Relevant Orders    Ambulatory referral/consult to Physical/Occupational Therapy    Ambulatory referral/consult to Orthopedics    Central stenosis of spinal canal (Chronic)    Relevant Orders    Ambulatory referral/consult to Physical/Occupational Therapy    Ambulatory referral/consult to Orthopedics       Endocrine    Morbid (severe) obesity due to excess calories       Orthopedic    Chronic left shoulder pain (Chronic)    Relevant Orders    X-Ray Shoulder 2 or More Views Left     Other Visit Diagnoses       Chronic right shoulder pain    -  Primary    Relevant Orders    X-ray Shoulder 2 or More Views Right    Ambulatory referral/consult to Physical/Occupational Therapy    Ambulatory referral/consult to Orthopedics              Health Maintenance Due   Topic Date Due    Pneumococcal Vaccines (Age  0-64) (1 - PCV) Never done    Shingles Vaccine (1 of 2) Never done    COVID-19 Vaccine (4 - Pfizer series) 12/15/2021    Diabetes Urine Screening  10/11/2022    Mammogram  05/16/2023       Follow up in about 4 months (around 1/28/2024) for Follow up, Diabetes, HTN.        Signature:  ALYX Robison  [facility]  [address] [facility] [id number]      Date of encounter: 9/28/23

## 2023-10-05 ENCOUNTER — HOSPITAL ENCOUNTER (OUTPATIENT)
Dept: RADIOLOGY | Facility: HOSPITAL | Age: 58
Discharge: HOME | End: 2023-10-05
Attending: NURSE PRACTITIONER
Payer: MEDICARE

## 2023-10-05 DIAGNOSIS — M25.511 CHRONIC RIGHT SHOULDER PAIN: ICD-10-CM

## 2023-10-05 DIAGNOSIS — M25.512 CHRONIC LEFT SHOULDER PAIN: ICD-10-CM

## 2023-10-05 DIAGNOSIS — G89.29 CHRONIC RIGHT SHOULDER PAIN: ICD-10-CM

## 2023-10-05 DIAGNOSIS — G89.29 CHRONIC LEFT SHOULDER PAIN: ICD-10-CM

## 2023-10-05 PROCEDURE — 73030 X-RAY EXAM OF SHOULDER: CPT | Mod: TC,LT

## 2023-10-10 ENCOUNTER — PATIENT MESSAGE (OUTPATIENT)
Dept: INTERNAL MEDICINE | Facility: CLINIC | Age: 58
End: 2023-10-10
Payer: MEDICARE

## 2023-10-10 DIAGNOSIS — E11.22 TYPE 2 DIABETES MELLITUS WITH STAGE 3A CHRONIC KIDNEY DISEASE, WITH LONG-TERM CURRENT USE OF INSULIN: Chronic | ICD-10-CM

## 2023-10-10 DIAGNOSIS — Z79.4 TYPE 2 DIABETES MELLITUS WITH STAGE 3A CHRONIC KIDNEY DISEASE, WITH LONG-TERM CURRENT USE OF INSULIN: Chronic | ICD-10-CM

## 2023-10-10 DIAGNOSIS — R21 RASH: ICD-10-CM

## 2023-10-10 DIAGNOSIS — N18.31 TYPE 2 DIABETES MELLITUS WITH STAGE 3A CHRONIC KIDNEY DISEASE, WITH LONG-TERM CURRENT USE OF INSULIN: Chronic | ICD-10-CM

## 2023-10-10 RX ORDER — KETOCONAZOLE 20 MG/G
CREAM TOPICAL 2 TIMES DAILY
Qty: 60 G | Refills: 2 | Status: SHIPPED | OUTPATIENT
Start: 2023-10-10 | End: 2023-10-24

## 2023-10-11 RX ORDER — SITAGLIPTIN AND METFORMIN HYDROCHLORIDE 1000; 50 MG/1; MG/1
1 TABLET, FILM COATED ORAL 2 TIMES DAILY
Qty: 180 TABLET | Refills: 3 | OUTPATIENT
Start: 2023-10-11

## 2023-10-11 RX ORDER — PEN NEEDLE, DIABETIC 32GX 5/32"
NEEDLE, DISPOSABLE MISCELLANEOUS
Qty: 100 EACH | Refills: 3 | Status: SHIPPED | OUTPATIENT
Start: 2023-10-11

## 2023-10-23 RX ORDER — DICLOFENAC SODIUM 10 MG/G
2 GEL TOPICAL 2 TIMES DAILY PRN
Qty: 100 G | Refills: 3 | Status: SHIPPED | OUTPATIENT
Start: 2023-10-23 | End: 2023-12-01 | Stop reason: SDUPTHER

## 2023-11-03 ENCOUNTER — HOSPITAL ENCOUNTER (EMERGENCY)
Facility: HOSPITAL | Age: 58
Discharge: HOME | End: 2023-11-03
Attending: INTERNAL MEDICINE
Payer: MEDICARE

## 2023-11-03 ENCOUNTER — NURSE TRIAGE (OUTPATIENT)
Dept: ADMINISTRATIVE | Facility: CLINIC | Age: 58
End: 2023-11-03
Payer: MEDICARE

## 2023-11-03 VITALS
DIASTOLIC BLOOD PRESSURE: 68 MMHG | TEMPERATURE: 98 F | OXYGEN SATURATION: 99 % | WEIGHT: 202.81 LBS | RESPIRATION RATE: 18 BRPM | SYSTOLIC BLOOD PRESSURE: 114 MMHG | HEART RATE: 84 BPM | BODY MASS INDEX: 36.85 KG/M2

## 2023-11-03 DIAGNOSIS — Z79.4 UNCONTROLLED TYPE 2 DIABETES MELLITUS WITH HYPERGLYCEMIA, WITH LONG-TERM CURRENT USE OF INSULIN: ICD-10-CM

## 2023-11-03 DIAGNOSIS — M79.674 GREAT TOE PAIN, RIGHT: ICD-10-CM

## 2023-11-03 DIAGNOSIS — Q82.8 HAILEY-HAILEY DISEASE: ICD-10-CM

## 2023-11-03 DIAGNOSIS — E11.65 UNCONTROLLED TYPE 2 DIABETES MELLITUS WITH HYPERGLYCEMIA, WITH LONG-TERM CURRENT USE OF INSULIN: ICD-10-CM

## 2023-11-03 DIAGNOSIS — L03.032 PARONYCHIA OF SECOND TOE OF LEFT FOOT: ICD-10-CM

## 2023-11-03 DIAGNOSIS — N30.90 CYSTITIS: ICD-10-CM

## 2023-11-03 DIAGNOSIS — R21 RASH AND NONSPECIFIC SKIN ERUPTION: Primary | ICD-10-CM

## 2023-11-03 LAB
ALBUMIN SERPL-MCNC: 3.5 G/DL (ref 3.5–5)
ALBUMIN/GLOB SERPL: 0.9 RATIO (ref 1.1–2)
ALP SERPL-CCNC: 84 UNIT/L (ref 40–150)
ALT SERPL-CCNC: 23 UNIT/L (ref 0–55)
APPEARANCE UR: CLEAR
AST SERPL-CCNC: 29 UNIT/L (ref 5–34)
BACTERIA #/AREA URNS AUTO: ABNORMAL /HPF
BASOPHILS # BLD AUTO: 0.14 X10(3)/MCL
BASOPHILS NFR BLD AUTO: 2.1 %
BILIRUB SERPL-MCNC: 0.3 MG/DL
BILIRUB UR QL STRIP.AUTO: NEGATIVE
BUN SERPL-MCNC: 20.7 MG/DL (ref 9.8–20.1)
CALCIUM SERPL-MCNC: 9.3 MG/DL (ref 8.4–10.2)
CHLORIDE SERPL-SCNC: 107 MMOL/L (ref 98–107)
CO2 SERPL-SCNC: 22 MMOL/L (ref 22–29)
COLOR UR AUTO: ABNORMAL
CREAT SERPL-MCNC: 1.48 MG/DL (ref 0.55–1.02)
EOSINOPHIL # BLD AUTO: 0.61 X10(3)/MCL (ref 0–0.9)
EOSINOPHIL NFR BLD AUTO: 9 %
ERYTHROCYTE [DISTWIDTH] IN BLOOD BY AUTOMATED COUNT: 13.7 % (ref 11.5–17)
GFR SERPLBLD CREATININE-BSD FMLA CKD-EPI: 41 MLS/MIN/1.73/M2
GLOBULIN SER-MCNC: 4.1 GM/DL (ref 2.4–3.5)
GLUCOSE SERPL-MCNC: 174 MG/DL (ref 74–100)
GLUCOSE UR QL STRIP.AUTO: ABNORMAL
HCT VFR BLD AUTO: 36.9 % (ref 37–47)
HGB BLD-MCNC: 11.5 G/DL (ref 12–16)
HYALINE CASTS #/AREA URNS LPF: ABNORMAL /LPF
IMM GRANULOCYTES # BLD AUTO: 0.01 X10(3)/MCL (ref 0–0.04)
IMM GRANULOCYTES NFR BLD AUTO: 0.1 %
KETONES UR QL STRIP.AUTO: NEGATIVE
LEUKOCYTE ESTERASE UR QL STRIP.AUTO: 75
LYMPHOCYTES # BLD AUTO: 2.43 X10(3)/MCL (ref 0.6–4.6)
LYMPHOCYTES NFR BLD AUTO: 35.8 %
MCH RBC QN AUTO: 28.1 PG (ref 27–31)
MCHC RBC AUTO-ENTMCNC: 31.2 G/DL (ref 33–36)
MCV RBC AUTO: 90.2 FL (ref 80–94)
MONOCYTES # BLD AUTO: 0.43 X10(3)/MCL (ref 0.1–1.3)
MONOCYTES NFR BLD AUTO: 6.3 %
MUCOUS THREADS URNS QL MICRO: ABNORMAL /LPF
NEUTROPHILS # BLD AUTO: 3.17 X10(3)/MCL (ref 2.1–9.2)
NEUTROPHILS NFR BLD AUTO: 46.7 %
NITRITE UR QL STRIP.AUTO: ABNORMAL
NRBC BLD AUTO-RTO: 0 %
PH UR STRIP.AUTO: 5 [PH]
PLATELET # BLD AUTO: 327 X10(3)/MCL (ref 130–400)
PMV BLD AUTO: 8.9 FL (ref 7.4–10.4)
POTASSIUM SERPL-SCNC: 4.7 MMOL/L (ref 3.5–5.1)
PROT SERPL-MCNC: 7.6 GM/DL (ref 6.4–8.3)
PROT UR QL STRIP.AUTO: ABNORMAL
RBC # BLD AUTO: 4.09 X10(6)/MCL (ref 4.2–5.4)
RBC #/AREA URNS AUTO: ABNORMAL /HPF
RBC UR QL AUTO: NEGATIVE
SODIUM SERPL-SCNC: 137 MMOL/L (ref 136–145)
SP GR UR STRIP.AUTO: 1.03 (ref 1–1.03)
SQUAMOUS #/AREA URNS LPF: ABNORMAL /HPF
UROBILINOGEN UR STRIP-ACNC: NORMAL
WBC # SPEC AUTO: 6.79 X10(3)/MCL (ref 4.5–11.5)
WBC #/AREA URNS AUTO: ABNORMAL /HPF

## 2023-11-03 PROCEDURE — 81001 URINALYSIS AUTO W/SCOPE: CPT | Performed by: PHYSICIAN ASSISTANT

## 2023-11-03 PROCEDURE — 82962 GLUCOSE BLOOD TEST: CPT

## 2023-11-03 PROCEDURE — 99284 EMERGENCY DEPT VISIT MOD MDM: CPT

## 2023-11-03 PROCEDURE — 85025 COMPLETE CBC W/AUTO DIFF WBC: CPT | Performed by: PHYSICIAN ASSISTANT

## 2023-11-03 PROCEDURE — 25000003 PHARM REV CODE 250: Performed by: PHYSICIAN ASSISTANT

## 2023-11-03 PROCEDURE — 80053 COMPREHEN METABOLIC PANEL: CPT | Performed by: PHYSICIAN ASSISTANT

## 2023-11-03 RX ORDER — ACETAMINOPHEN 500 MG
1000 TABLET ORAL
Status: COMPLETED | OUTPATIENT
Start: 2023-11-03 | End: 2023-11-03

## 2023-11-03 RX ORDER — METHOCARBAMOL 500 MG/1
1000 TABLET, FILM COATED ORAL
Status: COMPLETED | OUTPATIENT
Start: 2023-11-03 | End: 2023-11-03

## 2023-11-03 RX ORDER — DOXYCYCLINE 100 MG/1
100 CAPSULE ORAL 2 TIMES DAILY
Qty: 14 CAPSULE | Refills: 0 | Status: SHIPPED | OUTPATIENT
Start: 2023-11-03 | End: 2023-11-03 | Stop reason: ALTCHOICE

## 2023-11-03 RX ORDER — CEPHALEXIN 500 MG/1
500 CAPSULE ORAL 4 TIMES DAILY
Qty: 28 CAPSULE | Refills: 0 | Status: SHIPPED | OUTPATIENT
Start: 2023-11-03 | End: 2023-11-10

## 2023-11-03 RX ORDER — CLOTRIMAZOLE AND BETAMETHASONE DIPROPIONATE 10; .64 MG/G; MG/G
CREAM TOPICAL 2 TIMES DAILY
Qty: 45 G | Refills: 0 | Status: SHIPPED | OUTPATIENT
Start: 2023-11-03 | End: 2024-03-14 | Stop reason: ALTCHOICE

## 2023-11-03 RX ORDER — HYDROCODONE BITARTRATE AND ACETAMINOPHEN 5; 325 MG/1; MG/1
1 TABLET ORAL EVERY 6 HOURS PRN
Qty: 12 TABLET | Refills: 0 | Status: SHIPPED | OUTPATIENT
Start: 2023-11-03 | End: 2023-11-08

## 2023-11-03 RX ADMIN — METHOCARBAMOL 1000 MG: 500 TABLET ORAL at 12:11

## 2023-11-03 RX ADMIN — ACETAMINOPHEN 1000 MG: 500 TABLET, FILM COATED ORAL at 12:11

## 2023-11-03 NOTE — ED PROVIDER NOTES
Encounter Date: 11/3/2023       History     Chief Complaint   Patient presents with    Rash    Foot Pain     PT W CO ITCHY RASH TO LOWER ABD. SKIN FOLDS > 2 MONTHS.  BILAT  TOE/FOOT PAIN X 3 DAYS. NO INJURY, HURTS TO WALK.  HX OF DM.  .      57-year-old female with past medical history significant for type 2 diabetes, diabetic neuropathy, Yolanda-Yolanda disease, morbid obesity, hypertension, hyperlipidemia and CKD presents to ED complaining of 2+ month history of pruritus and irritation to lower abdominal skin folds.  Patient reports this is a chronic condition secondary to Yolanda Yolanda disease.  Reports it is typically treated with combination steroid and antifungal creams, and reports she was previously followed by a dermatologist for this.  Patient is also complaining of pain to right big toe and left long toe for the past 3 days.  Denies any recent injury to feet.  Reports pain is 3/10 at rest but increases to 7/10 with ambulation.  Patient has not been taking any medicine for this pain.  Denies fever, chills, intra abdominal pain, nausea, vomiting, diarrhea, constipation, polyuria, polydipsia, appetite changes.  Vital signs stable on arrival, patient in no acute distress.      Review of patient's allergies indicates:   Allergen Reactions    Aspirin      Past Medical History:   Diagnosis Date    Anxiety disorder, unspecified     Chronic constipation     CKD (chronic kidney disease)     Depression     DM (diabetes mellitus)     Dyslipidemia     GERD (gastroesophageal reflux disease)     HTN (hypertension)     Irritable bowel syndrome     Morbid obesity     Sleep apnea, unspecified      Past Surgical History:   Procedure Laterality Date    ABSCESS DRAINAGE      I don't remember the date    ARTHROSCOPIC REMOVAL OF LOOSE BODY FROM JOINT       SECTION      CHOLECYSTECTOMY      Drainage of oral abscess      Exploration using laparoscope      UPPER GASTROINTESTINAL ENDOSCOPY       Family History    Problem Relation Age of Onset    Cancer Mother     Heart disease Mother     Rheum arthritis Mother     Diabetes Father     Heart attack Father     Kidney failure Father     Stroke Father     Cancer Brother      Social History     Tobacco Use    Smoking status: Never     Passive exposure: Never    Smokeless tobacco: Never   Substance Use Topics    Alcohol use: Never    Drug use: Never     Review of Systems   All other systems reviewed and are negative.      Physical Exam     Initial Vitals [11/03/23 1046]   BP Pulse Resp Temp SpO2   110/73 92 18 98.4 °F (36.9 °C) 97 %      MAP       --         Physical Exam    Nursing note and vitals reviewed.  Constitutional: She appears well-developed and well-nourished. She is not diaphoretic. No distress.   HENT:   Head: Normocephalic and atraumatic.   Mouth/Throat: Oropharynx is clear and moist.   Eyes: Conjunctivae and EOM are normal. Pupils are equal, round, and reactive to light.   Neck: Neck supple.   Normal range of motion.  Cardiovascular:  Normal rate, regular rhythm, normal heart sounds and intact distal pulses.     Exam reveals no gallop and no friction rub.       No murmur heard.  Pulmonary/Chest: Breath sounds normal. No respiratory distress. She has no wheezes. She has no rhonchi. She has no rales.   Abdominal: Abdomen is soft. Bowel sounds are normal. She exhibits no distension and no mass. There is no abdominal tenderness. There is no rebound and no guarding.   Musculoskeletal:         General: No tenderness or edema. Normal range of motion.      Cervical back: Normal range of motion and neck supple.        Feet:      Neurological: She is alert and oriented to person, place, and time. She has normal strength.   Skin: Skin is warm and dry. Capillary refill takes less than 2 seconds. Rash (Erythematous, beefy plaques to skin folds of lower abdomen consistent with Candida.) noted. There is erythema (Mild erythema and tenderness to palpation at cuticle of left long  toe.  No fluctuance, induration or drainage appreciated.). No pallor.   Psychiatric: She has a normal mood and affect. Thought content normal.         ED Course   Procedures  Labs Reviewed   COMPREHENSIVE METABOLIC PANEL - Abnormal; Notable for the following components:       Result Value    Glucose Level 174 (*)     Blood Urea Nitrogen 20.7 (*)     Creatinine 1.48 (*)     Globulin 4.1 (*)     Albumin/Globulin Ratio 0.9 (*)     All other components within normal limits   URINALYSIS, REFLEX TO URINE CULTURE - Abnormal; Notable for the following components:    Protein, UA Trace (*)     Glucose, UA 4+ (*)     Nitrites, UA 2+ (*)     Leukocyte Esterase, UA 75 (*)     Squamous Epithelial Cells, UA Few (*)     Mucous, UA Trace (*)     All other components within normal limits   CBC WITH DIFFERENTIAL - Abnormal; Notable for the following components:    RBC 4.09 (*)     Hgb 11.5 (*)     Hct 36.9 (*)     MCHC 31.2 (*)     All other components within normal limits   CBC W/ AUTO DIFFERENTIAL    Narrative:     The following orders were created for panel order CBC auto differential.  Procedure                               Abnormality         Status                     ---------                               -----------         ------                     CBC with Differential[[id number]]       Abnormal            Final result                 Please view results for these tests on the individual orders.   EXTRA TUBES    Narrative:     The following orders were created for panel order EXTRA TUBES.  Procedure                               Abnormality         Status                     ---------                               -----------         ------                     Light Blue Top Hold[[id number]]                             In process                 Gold Top Hold[[id number]]                                   In process                 Pink Top Hold[[id number]]                                   In process                    Please view results for these tests on the individual orders.   LIGHT BLUE TOP HOLD   GOLD TOP HOLD   PINK TOP HOLD          Imaging Results    None          Medications   acetaminophen tablet 1,000 mg (1,000 mg Oral Given 11/3/23 1206)   methocarbamoL tablet 1,000 mg (1,000 mg Oral Given 11/3/23 1206)     Medical Decision Making  Differential diagnosis:  Includes but not limited to Cherie, Balta Balta disease, toe injury, paronychia, uncontrolled diabetes, DKA    ED management:  Patient given p.o. Tylenol and Robaxin, pain improving.    ED course:  CBC reveals mild normocytic, hypochromic anemia with H&H of 11.5 and 36.9.  These values are mildly increased from patient's baseline, CBC overall unremarkable.  CMP reveals hyperglycemia of 174 with normal anion gap along with stable renal indices consistent with CKD, overall unremarkable.  Urinalysis reveals glucosuria consistent with poorly-controlled diabetes and changes consistent with UTI.  Patient is nontoxic appearing with normal vitals throughout ED stay, including on reassessment; stable for discharge.  We will send patient home with Keflex to cover paronychia and UTI.  We will also discharge patient with Lotrisone cream to treat rash and Norco for p.r.n. relief of pain.  Instructed patient to contact PCP today to schedule follow up appointment for next week.  I will place referral to dermatology clinic for further evaluation.  Discussed proper diet for adequate glycemic control with patient.  ED precautions given for new or worsening symptoms and patient verbalized understanding.  All test results explained and all questions answered.    Amount and/or Complexity of Data Reviewed  Labs: ordered. Decision-making details documented in ED Course.                               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

## 2023-11-03 NOTE — DISCHARGE INSTRUCTIONS
Report to Emergency Department if symptoms return or worsen; [facility] Within 1 to 2 days, It is important that you follow up with your primary care provider or specialist if indicated for further evaluation, workup, and treatment as necessary. The exam and treatment you received in Emergency Department was for an urgent problem and NOT INTENDED AS COMPLETE CARE. It is important that you FOLLOW UP with a doctor for ongoing care. If your symptoms become WORSE or you DO NOT IMPROVE and you are unable to reach your health care provider, you should RETURN to the Emergency Department. The Emergency Department provider has provided a PRELIMINARY INTERPRETATION of all your studies. A final interpretation may be done after you are discharged. If a change in your diagnosis or treatment is needed WE WILL CONTACT YOU. It is critical that we have a CURRENT PHONE NUMBER FOR YOU.

## 2023-11-03 NOTE — TELEPHONE ENCOUNTER
"Pt states "area on stomach from , red,black." Pt states has been x months. Pt reports drainage and severe pain at times. Per protocol, go to ED now. Pt verbalizes understanding and advised to call back for any further questions or concerns  Reason for Disposition   SEVERE pain in the wound    Additional Information   Wound infection suspected   Negative: [1] Widespread rash AND [2] bright red, sunburn-like AND [3] too weak to stand   Negative: Sounds like a life-threatening emergency to the triager    Protocols used: Skin Injury-A-AH, Wound Infection [id number]    "

## 2023-11-04 LAB — POCT GLUCOSE: 240 MG/DL (ref 70–110)

## 2023-11-09 RX ORDER — FLUCONAZOLE 150 MG/1
150 TABLET ORAL DAILY
Qty: 1 TABLET | Refills: 0 | Status: SHIPPED | OUTPATIENT
Start: 2023-11-09 | End: 2023-11-10

## 2023-11-09 NOTE — CARE UPDATE
Patient called ER for Diflucan, states when she takes antibiotics she always gets a vaginal yeast infection.  Instructed to contact her PCP for additional medication.

## 2023-11-20 NOTE — TELEPHONE ENCOUNTER
Maegan,     Please block the 9:15 am slot that the pt is currently scheduled in and create a 10:30 am virtual visit for her. Let her know of the time change. Thanks

## 2023-11-21 ENCOUNTER — HOSPITAL ENCOUNTER (OUTPATIENT)
Dept: RADIOLOGY | Facility: HOSPITAL | Age: 58
Discharge: HOME | End: 2023-11-21
Attending: NURSE PRACTITIONER
Payer: MEDICARE

## 2023-11-21 DIAGNOSIS — Z12.31 ENCOUNTER FOR SCREENING MAMMOGRAM FOR MALIGNANT NEOPLASM OF BREAST: ICD-10-CM

## 2023-11-21 PROCEDURE — 77063 BREAST TOMOSYNTHESIS BI: CPT | Mod: 26,,, | Performed by: STUDENT IN AN ORGANIZED HEALTH CARE EDUCATION/TRAINING PROGRAM

## 2023-11-21 PROCEDURE — 77067 SCR MAMMO BI INCL CAD: CPT | Mod: TC

## 2023-11-21 PROCEDURE — 77067 SCR MAMMO BI INCL CAD: CPT | Mod: 26,,, | Performed by: STUDENT IN AN ORGANIZED HEALTH CARE EDUCATION/TRAINING PROGRAM

## 2023-11-21 PROCEDURE — 77067 MAMMO DIGITAL SCREENING BILAT WITH TOMO: ICD-10-PCS | Mod: 26,,, | Performed by: STUDENT IN AN ORGANIZED HEALTH CARE EDUCATION/TRAINING PROGRAM

## 2023-11-21 PROCEDURE — 77063 MAMMO DIGITAL SCREENING BILAT WITH TOMO: ICD-10-PCS | Mod: 26,,, | Performed by: STUDENT IN AN ORGANIZED HEALTH CARE EDUCATION/TRAINING PROGRAM

## 2023-12-01 ENCOUNTER — OFFICE VISIT (OUTPATIENT)
Dept: INTERNAL MEDICINE | Facility: CLINIC | Age: 58
End: 2023-12-01
Payer: MEDICARE

## 2023-12-01 DIAGNOSIS — R21 RASH: ICD-10-CM

## 2023-12-01 DIAGNOSIS — L03.039 PARONYCHIA OF SECOND TOE: Primary | ICD-10-CM

## 2023-12-01 PROCEDURE — 99214 OFFICE O/P EST MOD 30 MIN: CPT | Mod: 95,,, | Performed by: NURSE PRACTITIONER

## 2023-12-01 PROCEDURE — 3066F PR DOCUMENTATION OF TREATMENT FOR NEPHROPATHY: ICD-10-PCS | Mod: CPTII,95,, | Performed by: NURSE PRACTITIONER

## 2023-12-01 PROCEDURE — 1159F MED LIST DOCD IN RCRD: CPT | Mod: CPTII,95,, | Performed by: NURSE PRACTITIONER

## 2023-12-01 PROCEDURE — 4010F ACE/ARB THERAPY RXD/TAKEN: CPT | Mod: CPTII,95,, | Performed by: NURSE PRACTITIONER

## 2023-12-01 PROCEDURE — 1159F PR MEDICATION LIST DOCUMENTED IN MEDICAL RECORD: ICD-10-PCS | Mod: CPTII,95,, | Performed by: NURSE PRACTITIONER

## 2023-12-01 PROCEDURE — 3066F NEPHROPATHY DOC TX: CPT | Mod: CPTII,95,, | Performed by: NURSE PRACTITIONER

## 2023-12-01 PROCEDURE — 1160F RVW MEDS BY RX/DR IN RCRD: CPT | Mod: CPTII,95,, | Performed by: NURSE PRACTITIONER

## 2023-12-01 PROCEDURE — 1160F PR REVIEW ALL MEDS BY PRESCRIBER/CLIN PHARMACIST DOCUMENTED: ICD-10-PCS | Mod: CPTII,95,, | Performed by: NURSE PRACTITIONER

## 2023-12-01 PROCEDURE — 3051F PR MOST RECENT HEMOGLOBIN A1C LEVEL 7.0 - < 8.0%: ICD-10-PCS | Mod: CPTII,95,, | Performed by: NURSE PRACTITIONER

## 2023-12-01 PROCEDURE — 4010F PR ACE/ARB THEARPY RXD/TAKEN: ICD-10-PCS | Mod: CPTII,95,, | Performed by: NURSE PRACTITIONER

## 2023-12-01 PROCEDURE — 99214 PR OFFICE/OUTPT VISIT, EST, LEVL IV, 30-39 MIN: ICD-10-PCS | Mod: 95,,, | Performed by: NURSE PRACTITIONER

## 2023-12-01 PROCEDURE — 3051F HG A1C>EQUAL 7.0%<8.0%: CPT | Mod: CPTII,95,, | Performed by: NURSE PRACTITIONER

## 2023-12-01 RX ORDER — FLUCONAZOLE 150 MG/1
150 TABLET ORAL DAILY
Qty: 1 TABLET | Refills: 0 | Status: SHIPPED | OUTPATIENT
Start: 2023-12-01 | End: 2023-12-02

## 2023-12-01 RX ORDER — DOXYCYCLINE 100 MG/1
100 CAPSULE ORAL 2 TIMES DAILY
COMMUNITY
Start: 2023-11-03 | End: 2024-03-14 | Stop reason: ALTCHOICE

## 2023-12-01 RX ORDER — MUPIROCIN 20 MG/G
OINTMENT TOPICAL 3 TIMES DAILY
COMMUNITY
Start: 2023-10-01 | End: 2024-03-14 | Stop reason: ALTCHOICE

## 2023-12-01 RX ORDER — DICLOFENAC SODIUM 10 MG/G
2 GEL TOPICAL 2 TIMES DAILY PRN
Qty: 100 G | Refills: 3 | Status: SHIPPED | OUTPATIENT
Start: 2023-12-01 | End: 2024-03-14

## 2023-12-01 NOTE — PROGRESS NOTES
The patient location is: at home  The chief complaint leading to consultation is: ED f/u for rash    Visit type: audiovisual    Face to Face time with patient: 10 mins  20 minutes of total time spent on the encounter, which includes face to face time and non-face to face time preparing to see the patient (eg, review of tests), Obtaining and/or reviewing separately obtained history, Documenting clinical information in the electronic or other health record, Independently interpreting results (not separately reported) and communicating results to the patient/family/caregiver, or Care coordination (not separately reported).         Each patient to whom he or she provides medical services by telemedicine is:  (1) informed of the relationship between the physician and patient and the respective role of any other health care provider with respect to management of the patient; and (2) notified that he or she may decline to receive medical services by telemedicine and may withdraw from such care at any time.    Billing Provider: ALYX Robison  Level of Service: MI OFFICE/OUTPT VISIT, EST, LEVL IV, 30-39 MIN  Patient PCP Information       Provider PCP Type    ALYX Robison General            Reason for Visit / Chief Complaint: ER  follow up  (Rash, foot pain improved)       History of Present Illness / Problem Focused Workflow     Gloria Denis is a 58 y.o. Black or  female for ED f/u for rash and paronychia. PMH HTN, dyslipidemia, JORGE ALBERTO on Bipap, DM, DM neuropathy, depression, anxiety, CKD, hyperkalemia, L NPDR, GERD, chronic constipation, morbid obesity, atrophic vaginitis, Yolanda-Yolanda dz, and chronic back pain. Followed by [facility] cardio, GYN, renal and GI clinics.     Pt reported to ED pn 11/3/23 with itchy rash to lower abd skin fold x 2 months and bilateral toe/foot pain. Was dx'd with paronychia and rash and prescribed keflex, lotrimin and norco. Pt states rash is improving  but not resolved as she did not complete abx d/t causing yeast infection. Reports toes have resolved. Denies chest pain, shortness of breath, cough, fever, headache, dizziness, weakness, abdominal pain, nausea, vomiting, diarrhea, constipation, dysuria, SI/HI.      Review of Systems     Review of Systems   Constitutional:  Negative for activity change and unexpected weight change.   HENT:  Negative for hearing loss, rhinorrhea and trouble swallowing.    Eyes:  Negative for discharge and visual disturbance.   Respiratory:  Negative for chest tightness and wheezing.    Cardiovascular:  Negative for chest pain and palpitations.   Gastrointestinal:  Negative for blood in stool, constipation, diarrhea and vomiting.   Endocrine: Negative for polydipsia and polyuria.   Genitourinary:  Negative for difficulty urinating, dysuria, hematuria and menstrual problem.   Musculoskeletal:  Positive for neck pain. Negative for arthralgias and joint swelling.   Neurological:  Negative for weakness and headaches.   Psychiatric/Behavioral:  Negative for confusion and dysphoric mood.         See HPI for details    Constitutional: Denies Change in appetite. Denies Chills. Denies Fever. Denies Night sweats.  Eye: Denies Blurred vision. Denies Discharge. Denies Eye pain.  ENT: Denies Decreased hearing. Denies Sore throat. Denies Swollen glands.  Respiratory: Denies Cough. Denies Shortness of breath. Denies Shortness of breath with exertion. Denies Wheezing.  Cardiovascular: DeniesChest pain at rest. Denies Chest pain with exertion. Denies Irregular heartbeat. Denies Palpitations. Denies Edema.  Gastrointestinal: Denies Abdominal pain. Denies Diarrhea. Denies Nausea. Denies Vomiting. Denies Hematemesis or Hematochezia.  Genitourinary: Denies Dysuria. Denies Urinary frequency. Denies Urinary urgency. Denies Blood in urine.  Endocrine: Denies Cold intolerance. Denies Excessive thirst. Denies Heat intolerance. Denies Weight loss. Denies Weight  gain.  Neurologic: Denies Dizziness. Denies Fainting. Denies Headache.  Psychiatric: Denies Depression. Denies Anxiety. Denies Suicidal/Homicidal ideations.    All Other ROS: Negative except as stated in HPI.     Medical / Social / Family History     ----------------------------  Anxiety disorder, unspecified  Chronic constipation  CKD (chronic kidney disease)  Depression  DM (diabetes mellitus)  Dyslipidemia  GERD (gastroesophageal reflux disease)  HTN (hypertension)  Irritable bowel syndrome  Morbid obesity  Sleep apnea, unspecified     Past Surgical History:   Procedure Laterality Date    ABSCESS DRAINAGE      I don't remember the date    ARTHROSCOPIC REMOVAL OF LOOSE BODY FROM JOINT       SECTION      CHOLECYSTECTOMY      Drainage of oral abscess      Exploration using laparoscope      UPPER GASTROINTESTINAL ENDOSCOPY         Social History     Socioeconomic History    Marital status:      Spouse name: Kp   Tobacco Use    Smoking status: Never     Passive exposure: Never    Smokeless tobacco: Never   Substance and Sexual Activity    Alcohol use: Never    Drug use: Never    Sexual activity: Not Currently     Partners: Male     Birth control/protection: None     Social Determinants of Health     Financial Resource Strain: Medium Risk (2023)    Overall Financial Resource Strain (CARDIA)     Difficulty of Paying Living Expenses: Somewhat hard   Food Insecurity: Unknown (2023)    Hunger Vital Sign     Worried About Running Out of Food in the Last Year: Patient refused     Ran Out of Food in the Last Year: Patient refused   Transportation Needs: Unknown (2023)    PRAPARE - Transportation     Lack of Transportation (Medical): Patient refused     Lack of Transportation (Non-Medical): No   Physical Activity: Inactive (2023)    Exercise Vital Sign     Days of Exercise per Week: 0 days     Minutes of Exercise per Session: 10 min   Stress: No Stress Concern Present (2023)     "[facility] Occupational [facility] - Occupational Stress Questionnaire     Feeling of Stress : Only a little   Social Connections: Moderately Integrated (12/1/2023)    Social Connection and Isolation Panel [NHANES]     Frequency of Communication with Friends and Family: More than three times a week     Frequency of Social Gatherings with Friends and Family: Patient refused     Attends [employer] Services: More than 4 times per year     Active Member of Clubs or Organizations: Yes     Attends Club or Organization Meetings: Patient refused     Marital Status:    Housing Stability: Low Risk  (12/1/2023)    Housing Stability Vital Sign     Unable to Pay for Housing in the Last Year: No     Number of Places Lived in the Last Year: 1     Unstable Housing in the Last Year: No        Family History   Problem Relation Age of Onset    Cancer Mother     Heart disease Mother     Rheum arthritis Mother     Diabetes Father     Heart attack Father     Kidney failure Father     Stroke Father     Cancer Brother         Medications and Allergies     Medications  Current Outpatient Medications   Medication Instructions    atorvastatin (LIPITOR) 40 mg, Oral, Daily    baclofen (LIORESAL) 10 mg, Oral, 2 times daily PRN    BD CAYLA 2ND GEN PEN NEEDLE 32 gauge x 5/32" Ndle SMARTSIG:Injection Every Night    betamethasone dipropionate 0.05 % cream   See Instructions, APPLY  CREAM TOPICALLY TWICE DAILY, # 60 gm, 0 Refill(s), Pharmacy: [id number], 155, cm, Height/Length Dosing, 07/08/21 8:39:00 CDT, 112, kg, Weight Dosing, 07/08/21 11:11:00 CDT    blood sugar diagnostic Strp To check BG 2 times daily, to use with insurance preferred meter    blood-glucose meter kit To check BG 2 times daily, to use with insurance preferred meter    clotrimazole-betamethasone 1-0.05% (LOTRISONE) cream Topical (Top), 2 times daily    diclofenac sodium (VOLTAREN) 2 g, Topical (Top), 2 times daily PRN    doxycycline (VIBRAMYCIN) 100 mg, Oral, " 2 times daily    DULoxetine (CYMBALTA) 60 mg, Oral, Nightly    empagliflozin (JARDIANCE) 10 mg, Oral, Daily    enalapril (VASOTEC) 2.5 mg, Oral, Daily    fluconazole (DIFLUCAN) 150 mg, Oral, Daily    hydrOXYzine pamoate (VISTARIL) 25 MG Cap TAKE 1 CAPSULE BY MOUTH THREE TIMES DAILY AS NEEDED FOR ANXIETY    ketoconazole (NIZORAL) 2 % cream SMARTSIG:Sparingly Topical Twice Daily    lancets Misc To check BG 2 times daily, to use with insurance preferred meter    LEVEMIR FLEXPEN 100 unit/mL (3 mL) InPn pen SMARTSI Unit(s) SUB-Q Every Evening    LINZESS 290 mcg, Oral, Daily    LOKELMA 10 gram packet SMARTSI Packet(s) By Mouth 3 Times a Week    metoprolol tartrate (LOPRESSOR) 25 mg, Oral, 2 times daily    mupirocin (BACTROBAN) 2 % ointment Topical (Top), 3 times daily    nystatin (MYCOSTATIN) powder SMARTSI Gram(s) Topical Twice Daily    omeprazole (PRILOSEC) 40 mg, Oral, Daily    SITagliptan-metformin (JANUMET) 50-1,000 mg per tablet 1 tablet, Oral, 2 times daily    sodium bicarbonate 1,300 mg, Oral, Daily         Allergies  Review of patient's allergies indicates:   Allergen Reactions    Aspirin        Physical Examination    ]    Physical Exam  Neurological:      Mental Status: She is alert and oriented to person, place, and time.   Psychiatric:         Mood and Affect: Mood normal.         Speech: Speech normal.         Behavior: Behavior normal. Behavior is cooperative.         Thought Content: Thought content normal.         Judgment: Judgment normal.           Results     Lab Results   Component Value Date    WBC 6.79 2023    HGB 11.5 (L) 2023    HCT 36.9 (L) 2023     2023    CHOL 148 01/10/2023    TRIG 104 01/10/2023    ALT 23 2023    AST 29 2023     2023    K 4.7 2023    CREATININE 1.48 (H) 2023    BUN 20.7 (H) 2023    CO2 22 2023    TSH 1.073 01/10/2023    HGBA1C 7.0 2023       Assessment and Plan (including Health  Maintenance)     Plan:     1. Paronychia of second toe  Resolved.      2. Rash  Improving.  Keep area clean and dry.  Pat dry when moist.  Continue lotrimin cream as ordered.  Begin and complete keflex.  Rx diflucan x 1 dose at completion of abx; understanding voiced.  UCC/ED precautions discussed.       Problem List Items Addressed This Visit          Derm    Rash       ID    Paronychia of second toe - Primary         Health Maintenance Due   Topic Date Due    Pneumococcal Vaccines (Age 0-64) (1 - PCV) Never done    Shingles Vaccine (1 of 2) Never done    Diabetes Urine Screening  10/11/2022    COVID-19 Vaccine (4 - 2023-24 season) 09/01/2023    Hemoglobin A1c  11/08/2023       Follow up if symptoms worsen or fail to improve, for Keep appt as scheduled in Feb with labs prior.        Signature:  ALYX Robison  [facility]  [address] [id number]      Date of encounter: 12/1/23

## 2023-12-28 ENCOUNTER — PATIENT MESSAGE (OUTPATIENT)
Dept: INTERNAL MEDICINE | Facility: CLINIC | Age: 58
End: 2023-12-28
Payer: MEDICARE

## 2023-12-29 DIAGNOSIS — E11.22 TYPE 2 DIABETES MELLITUS WITH STAGE 3A CHRONIC KIDNEY DISEASE, WITH LONG-TERM CURRENT USE OF INSULIN: ICD-10-CM

## 2023-12-29 DIAGNOSIS — Z79.4 TYPE 2 DIABETES MELLITUS WITH STAGE 3A CHRONIC KIDNEY DISEASE, WITH LONG-TERM CURRENT USE OF INSULIN: ICD-10-CM

## 2023-12-29 DIAGNOSIS — N18.31 TYPE 2 DIABETES MELLITUS WITH STAGE 3A CHRONIC KIDNEY DISEASE, WITH LONG-TERM CURRENT USE OF INSULIN: ICD-10-CM

## 2024-01-23 RX ORDER — LANCETS
EACH MISCELLANEOUS
Qty: 100 EACH | Refills: 0 | Status: SHIPPED | OUTPATIENT
Start: 2024-01-23 | End: 2024-02-19

## 2024-02-02 NOTE — PROGRESS NOTES
CHIEF COMPLAINT:   No chief complaint on file.                  Review of patient's allergies indicates:   Allergen Reactions    Aspirin                                           HPI:  Gloria Denis 58 y.o. female with a past medical history of hypertension, hyperlipidemia, sleep apnea, diabetes mellitus, depression, anxiety, and chronic back pain who presents for follow up and ongoing care.  Patient completed an echocardiogram on June 30, 2022 which revealed an ejection fraction of 60% with grade 1 diastolic dysfunction.  See report below.  She completed AMBER testing on July 15, 2022 which revealed no significant arterial flow reduction in the bilateral lower extremities.  See report below.   She completed a Lexiscan stress test in July 2020 which revealed no ischemia. She also completed AMBER testing in June 2020 which revealed no evidence of significant arterial insufficiency. She continues to ambulate with a cane due to chronic back pain.  At last office visit patient reported stable CROUCH and occasional ongoing dizziness and lightheadedness that had actually improved from prior visit.    Today the patient states that she is feeling well overall and mostly unchanged from last visit.  She states that her CROUCH has been stable and has not progressed.  She still has occasional dyspnea with physical activity and occasionally with ADLs.  She states that she is not very active in her day-to-day today life and she is likely deconditioned.  She reports occasional palpitations where she feels as though her heart is racing, but states that it was not very bothersome in his only occurred about 3 times since her last visit.  Otherwise she denies any chest pain, lightheadedness, dizziness, syncope, PND, orthopnea, claudication symptoms, or peripheral edema.  She states that she was able to complete her ADLs without any issues or ischemic symptoms, however she does have to take frequent breaks due to fatigue.  She states  that she is only minimally physically active and her main limiting factors or chronic back pain in general fatigue.  She states that she is hoping to be able to exercise more in the future.  She reports compliance with all her current medications.  She denies any tobacco or other illicit drug use.  She states that she has been intermittently using her BiPAP.  Educated her on the importance of compliance.       CARDIAC TESTING  AMBER Testing 7.15.22:                                                                                                       The right lower extremity demonstrated multiphasic waveforms at all levels.   The AMBER on the right was normal at 1.15.  The TBI on the right was normal at 0.77.  The right lower extremity demonstrated no significant arterial flow reduction.     The left lower extremity demonstrated multiphasic waveforms at all levels.   The AMBER on the left was invalid due to non-compressible vessels at the ankle.  The TBI on the left was normal at 0.80.  The left lower extremity demonstrated no significant arterial flow reduction.                                                                                                                                                                                                                                                                                                    CARDIAC TESTING:  Results for orders placed during the hospital encounter of 06/30/22    Echo    Interpretation Summary  · The left ventricle is normal in size with concentric remodeling and normal systolic function.  · The estimated ejection fraction is 60%.  · Grade I left ventricular diastolic dysfunction.  · Normal right ventricular size with normal right ventricular systolic function.  · Normal central venous pressure (3 mmHg).  · The estimated PA systolic pressure is 23 mmHg.  · IVC is normal.  · There is no pulmonary hypertension.    Lexiscan stress test July 2020:  Scan  is consistent with: No ischemia  No scar  Attenuation artifact in the inferolateral wall  Ejection fraction: 90%.    AMBER testing 2020:  No evidence of significant arterial insufficiency was identified in the study.    Echocardiogram 2019:  Left ventricular ejection fraction is measured at approximately 55-60%.  Normal right ventricular size with preserved RV function.  No significant valvular abnormalities.  There is trace tricuspid regurgitation with estimated RVSP of 24 mmHg.  No evidence of pericardial effusion.          Patient Active Problem List   Diagnosis    Mixed anxiety depressive disorder    Chronic back pain    Diabetic neuropathy    Hyperlipidemia LDL goal <70    Primary hypertension    Sleep apnea    Sciatica    Chronic constipation    Gastroesophageal reflux disease    Screening for colon cancer    CKD (chronic kidney disease)    Chronic bilateral low back pain with bilateral sciatica    BMI 40.0-44.9, adult    CROUCH (dyspnea on exertion)    Cellulitis    Type 2 diabetes mellitus with stage 3a chronic kidney disease, with long-term current use of insulin    JORGE ALBERTO (obstructive sleep apnea)    Polyneuropathy associated with underlying disease    NPDR (nonproliferative diabetic retinopathy)    DDD (degenerative disc disease), lumbar    Central stenosis of spinal canal    Morbid (severe) obesity due to excess calories    Chronic left shoulder pain    Paronychia of second toe    Rash     Past Surgical History:   Procedure Laterality Date    ABSCESS DRAINAGE      I don't remember the date    ARTHROSCOPIC REMOVAL OF LOOSE BODY FROM JOINT       SECTION      CHOLECYSTECTOMY      Drainage of oral abscess      Exploration using laparoscope      UPPER GASTROINTESTINAL ENDOSCOPY       Social History     Socioeconomic History    Marital status:      Spouse name: Alva   Tobacco Use    Smoking status: Never     Passive exposure: Never    Smokeless tobacco: Never   Substance and Sexual Activity     Alcohol use: Never    Drug use: Never    Sexual activity: Not Currently     Partners: Male     Birth control/protection: None     Social Determinants of Health     Financial Resource Strain: Medium Risk (12/1/2023)    Overall Financial Resource Strain (CARDIA)     Difficulty of Paying Living Expenses: Somewhat hard   Food Insecurity: Patient Declined (12/1/2023)    Hunger Vital Sign     Worried About Running Out of Food in the Last Year: Patient declined     Ran Out of Food in the Last Year: Patient declined   Transportation Needs: Unknown (12/1/2023)    PRAPARE - Transportation     Lack of Transportation (Medical): Patient declined     Lack of Transportation (Non-Medical): No   Physical Activity: Inactive (12/1/2023)    Exercise Vital Sign     Days of Exercise per Week: 0 days     Minutes of Exercise per Session: 10 min   Stress: No Stress Concern Present (12/1/2023)    [employer] [place] [employer] - Occupational Stress Questionnaire     Feeling of Stress : Only a little   Social Connections: Unknown (12/1/2023)    Social Connection and Isolation Panel [NHANES]     Frequency of Communication with Friends and Family: More than three times a week     Frequency of Social Gatherings with Friends and Family: Patient declined     Active Member of Clubs or Organizations: Yes     Attends Club or Organization Meetings: Patient declined     Marital Status:    Housing Stability: Low Risk  (12/1/2023)    Housing Stability Vital Sign     Unable to Pay for Housing in the Last Year: No     Number of Places Lived in the Last Year: 1     Unstable Housing in the Last Year: No        Family History   Problem Relation Age of Onset    Cancer Mother     Heart disease Mother     Rheum arthritis Mother     Diabetes Father     Heart attack Father     Kidney failure Father     Stroke Father     Cancer Brother          Current Outpatient Medications:     atorvastatin (LIPITOR) 40 MG tablet, Take 1 tablet (40 mg total)  "by mouth once daily., Disp: 90 tablet, Rfl: 3    baclofen (LIORESAL) 10 MG tablet, Take 1 tablet (10 mg total) by mouth 2 (two) times daily as needed (muscle spasm or pain)., Disp: 30 tablet, Rfl: 1    BD CAYLA 2ND GEN PEN NEEDLE 32 gauge x 5/32" Ndle, SMARTSIG:Injection Every Night, Disp: 100 each, Rfl: 3    betamethasone dipropionate 0.05 % cream,  See Instructions, APPLY  CREAM TOPICALLY TWICE DAILY, # 60 gm, 0 Refill(s), Pharmacy: [id number], 155, cm, Height/Length Dosing, 21 8:39:00 CDT, 112, kg, Weight Dosing, 21 11:11:00 CDT, Disp: , Rfl:     blood sugar diagnostic Strp, To check BG 2 times daily, to use with insurance preferred meter, Disp: 100 each, Rfl: 3    blood-glucose meter kit, To check BG 2 times daily, to use with insurance preferred meter, Disp: 1 each, Rfl: 0    clotrimazole-betamethasone 1-0.05% (LOTRISONE) cream, Apply topically 2 (two) times daily., Disp: 45 g, Rfl: 0    diclofenac sodium (VOLTAREN) 1 % Gel, Apply 2 g topically 2 (two) times daily as needed (pain)., Disp: 100 g, Rfl: 3    doxycycline (VIBRAMYCIN) 100 MG Cap, Take 100 mg by mouth 2 (two) times daily., Disp: , Rfl:     DULoxetine (CYMBALTA) 60 MG capsule, Take 1 capsule (60 mg total) by mouth every evening., Disp: 90 capsule, Rfl: 3    empagliflozin (JARDIANCE) 10 mg tablet, Take 1 tablet (10 mg total) by mouth once daily., Disp: 90 tablet, Rfl: 3    enalapril (VASOTEC) 2.5 MG tablet, Take 1 tablet (2.5 mg total) by mouth once daily., Disp: 90 tablet, Rfl: 3    hydrOXYzine pamoate (VISTARIL) 25 MG Cap, TAKE 1 CAPSULE BY MOUTH THREE TIMES DAILY AS NEEDED FOR ANXIETY, Disp: 90 capsule, Rfl: 3    ketoconazole (NIZORAL) 2 % cream, SMARTSIG:Sparingly Topical Twice Daily, Disp: , Rfl:     lancets (ACCU-CHEK SOFTCLIX LANCETS) Misc, USE 1 LANCET TO CHECK GLUCOSE TWICE DAILY, Disp: 100 each, Rfl: 0    LEVEMIR FLEXPEN 100 unit/mL (3 mL) InPn pen, SMARTSI Unit(s) SUB-Q Every Evening, Disp: 27 mL, Rfl: 3    LINZESS " 290 mcg Cap capsule, Take 1 capsule (290 mcg total) by mouth once daily., Disp: 90 capsule, Rfl: 3    LOKELMA 10 gram packet, SMARTSI Packet(s) By Mouth 3 Times a Week, Disp: 45 packet, Rfl: 0    metoprolol tartrate (LOPRESSOR) 25 MG tablet, Take 1 tablet (25 mg total) by mouth 2 (two) times daily., Disp: 180 tablet, Rfl: 3    mupirocin (BACTROBAN) 2 % ointment, Apply topically 3 (three) times daily., Disp: , Rfl:     nystatin (MYCOSTATIN) powder, SMARTSI Gram(s) Topical Twice Daily, Disp: , Rfl:     omeprazole (PRILOSEC) 40 MG capsule, Take 1 capsule (40 mg total) by mouth once daily., Disp: 30 capsule, Rfl: 5    SITagliptan-metformin (JANUMET) 50-1,000 mg per tablet, Take 1 tablet by mouth 2 (two) times a day., Disp: 180 tablet, Rfl: 3    sodium bicarbonate 650 MG tablet, Take 1,300 mg by mouth Daily., Disp: , Rfl:      ROS:                                                                                                                                                                             Review of Systems   Constitutional: Negative.    Respiratory:  Positive for shortness of breath.    Cardiovascular:  Negative for chest pain, palpitations (Rare), orthopnea, claudication, leg swelling and PND.   Gastrointestinal: Negative.    Musculoskeletal:  Positive for back pain.   Skin: Negative.    Neurological:  Positive for dizziness.   Psychiatric/Behavioral: Negative.          There were no vitals taken for this visit.   PE:  Physical Exam  Constitutional:       Appearance: Normal appearance.   HENT:      Head: Normocephalic and atraumatic.      Mouth/Throat:      Mouth: Mucous membranes are moist.   Eyes:      Extraocular Movements: Extraocular movements intact.   Cardiovascular:      Rate and Rhythm: Normal rate and regular rhythm.      Pulses: Normal pulses.   Pulmonary:      Effort: Pulmonary effort is normal.      Breath sounds: Normal breath sounds.   Abdominal:      General: There is no distension.       Palpations: Abdomen is soft.   Musculoskeletal:         General: Normal range of motion.      Cervical back: Normal range of motion. No tenderness.      Right lower leg: No edema.      Left lower leg: No edema.   Skin:     General: Skin is warm and dry.   Neurological:      General: No focal deficit present.      Mental Status: She is alert and oriented to person, place, and time.   Psychiatric:         Mood and Affect: Mood normal.         Behavior: Behavior normal.        ASSESSMENT/PLAN:  Lightheadedness/Dizziness  - Reports only 1-2 episodes of dizziness and lightheadedness since her last visit.  States that it has greatly improved.  - Not limiting or interfering in ADLs  - Denies any syncopal events    Chest Pain - resolved  - Denies any recent chest pain or ischemic symptoms  - Lexiscan Stress test July 2020 - no ischemia    CROUCH   - Reports CROUCH with moderate intensity activities, occasionally with ADLs.  She states that it has been stable since last office visit.  Advised patient that if CROUCH persists we will likely repeat echocardiogram and stress test given no recent testing  - EF 60% per Echo June 2022  - EF 55-60% per Echo June 2019    Hypertension  - BP at goal today- 109/76  - Continue Enalapril and Metoprolol tartrate  - Counseled on low salt diet and increased activity as tolerated     Hyperlipidemia   -  per labs February 2024  - Patient states that she has not been diligent in following a low-cholesterol diet.  She states that she has been eating whatever her son cooks for her.  She would like to try with diet and exercise for a few more months before increasing atorvastatin dose.  - Will have her complete FLP/CMP prior next visit  - Continue Atorvastatin 40mg daily for now  - Strongly encouraged a low cholesterol, low fat diet and increased activity as tolerated     Sleep apnea on BiPAP   - Recommend nightly BiPAP use   - Reports intermittent use currently    Diabetes mellitus  - Management  per PCP    Depression/Anxiety  - Management per PCP    Chronic back pain  - Continue management per PCP    Bilateral lower extremity pain  - AMBER testing July 2022:  no significant arterial flow reduction in the bilateral lower extremities  - States that her pain is mostly due to her chronic sciatic nerve pain      Complete FLP/CMP in approximately 3 months, or prior to next visit   Follow up in cardiology clinic in 4 months or sooner if needed  Follow up with PCP as directed

## 2024-02-05 ENCOUNTER — PATIENT MESSAGE (OUTPATIENT)
Dept: INTERNAL MEDICINE | Facility: CLINIC | Age: 59
End: 2024-02-05
Payer: MEDICARE

## 2024-02-05 DIAGNOSIS — Z79.4 TYPE 2 DIABETES MELLITUS WITH STAGE 3A CHRONIC KIDNEY DISEASE, WITH LONG-TERM CURRENT USE OF INSULIN: Primary | Chronic | ICD-10-CM

## 2024-02-05 DIAGNOSIS — N18.31 TYPE 2 DIABETES MELLITUS WITH STAGE 3A CHRONIC KIDNEY DISEASE, WITH LONG-TERM CURRENT USE OF INSULIN: Primary | Chronic | ICD-10-CM

## 2024-02-05 DIAGNOSIS — E11.22 TYPE 2 DIABETES MELLITUS WITH STAGE 3A CHRONIC KIDNEY DISEASE, WITH LONG-TERM CURRENT USE OF INSULIN: Primary | Chronic | ICD-10-CM

## 2024-02-05 RX ORDER — INSULIN GLARGINE 100 [IU]/ML
30 INJECTION, SOLUTION SUBCUTANEOUS NIGHTLY
Qty: 9 ML | Refills: 0 | Status: SHIPPED | OUTPATIENT
Start: 2024-02-05 | End: 2024-02-15 | Stop reason: SDUPTHER

## 2024-02-05 NOTE — TELEPHONE ENCOUNTER
After speaking to pharmacy, they are saying insurance is no longer covering Levemir and prefer lantus. If you want to switch, they are asking for new rx

## 2024-02-06 RX ORDER — DULOXETIN HYDROCHLORIDE 60 MG/1
60 CAPSULE, DELAYED RELEASE ORAL NIGHTLY
Qty: 90 CAPSULE | Refills: 3 | Status: SHIPPED | OUTPATIENT
Start: 2024-02-06 | End: 2025-02-05

## 2024-02-07 ENCOUNTER — LAB VISIT (OUTPATIENT)
Dept: LAB | Facility: HOSPITAL | Age: 59
End: 2024-02-07
Attending: NURSE PRACTITIONER
Payer: MEDICARE

## 2024-02-07 DIAGNOSIS — N18.31 TYPE 2 DIABETES MELLITUS WITH STAGE 3A CHRONIC KIDNEY DISEASE, WITH LONG-TERM CURRENT USE OF INSULIN: ICD-10-CM

## 2024-02-07 DIAGNOSIS — E11.22 TYPE 2 DIABETES MELLITUS WITH STAGE 3A CHRONIC KIDNEY DISEASE, WITH LONG-TERM CURRENT USE OF INSULIN: ICD-10-CM

## 2024-02-07 DIAGNOSIS — N18.31 CKD STAGE G3A/A2, GFR 45-59 AND ALBUMIN CREATININE RATIO 30-299 MG/G: ICD-10-CM

## 2024-02-07 DIAGNOSIS — Z79.4 TYPE 2 DIABETES MELLITUS WITH STAGE 3A CHRONIC KIDNEY DISEASE, WITH LONG-TERM CURRENT USE OF INSULIN: ICD-10-CM

## 2024-02-07 DIAGNOSIS — I10 PRIMARY HYPERTENSION: ICD-10-CM

## 2024-02-07 DIAGNOSIS — E78.5 HYPERLIPIDEMIA LDL GOAL <70: Chronic | ICD-10-CM

## 2024-02-07 LAB
ALBUMIN SERPL-MCNC: 3.4 G/DL (ref 3.5–5)
ALBUMIN/GLOB SERPL: 0.9 RATIO (ref 1.1–2)
ALP SERPL-CCNC: 96 UNIT/L (ref 40–150)
ALT SERPL-CCNC: 17 UNIT/L (ref 0–55)
APPEARANCE UR: CLEAR
AST SERPL-CCNC: 21 UNIT/L (ref 5–34)
BACTERIA #/AREA URNS AUTO: ABNORMAL /HPF
BASOPHILS # BLD AUTO: 0.13 X10(3)/MCL
BASOPHILS NFR BLD AUTO: 2.1 %
BILIRUB SERPL-MCNC: 0.3 MG/DL
BILIRUB UR QL STRIP.AUTO: NEGATIVE
BUN SERPL-MCNC: 23 MG/DL (ref 9.8–20.1)
CALCIUM SERPL-MCNC: 9.3 MG/DL (ref 8.4–10.2)
CHLORIDE SERPL-SCNC: 105 MMOL/L (ref 98–107)
CHOLEST SERPL-MCNC: 195 MG/DL
CHOLEST/HDLC SERPL: 5 {RATIO} (ref 0–5)
CO2 SERPL-SCNC: 24 MMOL/L (ref 22–29)
COLOR UR AUTO: YELLOW
CREAT SERPL-MCNC: 1.49 MG/DL (ref 0.55–1.02)
CREAT UR-MCNC: 71.3 MG/DL (ref 45–106)
EOSINOPHIL # BLD AUTO: 0.53 X10(3)/MCL (ref 0–0.9)
EOSINOPHIL NFR BLD AUTO: 8.4 %
ERYTHROCYTE [DISTWIDTH] IN BLOOD BY AUTOMATED COUNT: 13.4 % (ref 11.5–17)
EST. AVERAGE GLUCOSE BLD GHB EST-MCNC: 188.6 MG/DL
GFR SERPLBLD CREATININE-BSD FMLA CKD-EPI: 41 MLS/MIN/1.73/M2
GLOBULIN SER-MCNC: 3.9 GM/DL (ref 2.4–3.5)
GLUCOSE SERPL-MCNC: 209 MG/DL (ref 74–100)
GLUCOSE UR QL STRIP.AUTO: ABNORMAL
HBA1C MFR BLD: 8.2 %
HCT VFR BLD AUTO: 38.3 % (ref 37–47)
HDLC SERPL-MCNC: 41 MG/DL (ref 35–60)
HGB BLD-MCNC: 11.7 G/DL (ref 12–16)
IMM GRANULOCYTES # BLD AUTO: 0.01 X10(3)/MCL (ref 0–0.04)
IMM GRANULOCYTES NFR BLD AUTO: 0.2 %
KETONES UR QL STRIP.AUTO: NEGATIVE
LDLC SERPL CALC-MCNC: 115 MG/DL (ref 50–140)
LEUKOCYTE ESTERASE UR QL STRIP.AUTO: ABNORMAL
LYMPHOCYTES # BLD AUTO: 2.33 X10(3)/MCL (ref 0.6–4.6)
LYMPHOCYTES NFR BLD AUTO: 37.1 %
MCH RBC QN AUTO: 27.6 PG (ref 27–31)
MCHC RBC AUTO-ENTMCNC: 30.5 G/DL (ref 33–36)
MCV RBC AUTO: 90.3 FL (ref 80–94)
MONOCYTES # BLD AUTO: 0.48 X10(3)/MCL (ref 0.1–1.3)
MONOCYTES NFR BLD AUTO: 7.6 %
NEUTROPHILS # BLD AUTO: 2.8 X10(3)/MCL (ref 2.1–9.2)
NEUTROPHILS NFR BLD AUTO: 44.6 %
NITRITE UR QL STRIP.AUTO: NEGATIVE
PH UR STRIP.AUTO: 5.5 [PH]
PLATELET # BLD AUTO: 327 X10(3)/MCL (ref 130–400)
PMV BLD AUTO: 9.3 FL (ref 7.4–10.4)
POTASSIUM SERPL-SCNC: 5.3 MMOL/L (ref 3.5–5.1)
PROT SERPL-MCNC: 7.3 GM/DL (ref 6.4–8.3)
PROT UR QL STRIP.AUTO: NEGATIVE
PROT UR STRIP-MCNC: 8.8 MG/DL
RBC # BLD AUTO: 4.24 X10(6)/MCL (ref 4.2–5.4)
RBC #/AREA URNS AUTO: ABNORMAL /HPF
RBC UR QL AUTO: NEGATIVE
SODIUM SERPL-SCNC: 137 MMOL/L (ref 136–145)
SP GR UR STRIP.AUTO: 1.01 (ref 1–1.03)
SQUAMOUS #/AREA URNS AUTO: ABNORMAL /HPF
TRIGL SERPL-MCNC: 195 MG/DL (ref 37–140)
TSH SERPL-ACNC: 1.33 UIU/ML (ref 0.35–4.94)
URINE PROTEIN/CREATININE RATIO (OLG): 0.1
UROBILINOGEN UR STRIP-ACNC: 0.2
VLDLC SERPL CALC-MCNC: 39 MG/DL
WBC # SPEC AUTO: 6.28 X10(3)/MCL (ref 4.5–11.5)
WBC #/AREA URNS AUTO: ABNORMAL /HPF

## 2024-02-07 PROCEDURE — 81003 URINALYSIS AUTO W/O SCOPE: CPT

## 2024-02-07 PROCEDURE — 85025 COMPLETE CBC W/AUTO DIFF WBC: CPT

## 2024-02-07 PROCEDURE — 82570 ASSAY OF URINE CREATININE: CPT

## 2024-02-07 PROCEDURE — 83036 HEMOGLOBIN GLYCOSYLATED A1C: CPT

## 2024-02-07 PROCEDURE — 80061 LIPID PANEL: CPT

## 2024-02-07 PROCEDURE — 80053 COMPREHEN METABOLIC PANEL: CPT

## 2024-02-07 PROCEDURE — 36415 COLL VENOUS BLD VENIPUNCTURE: CPT

## 2024-02-07 PROCEDURE — 84443 ASSAY THYROID STIM HORMONE: CPT

## 2024-02-15 ENCOUNTER — OFFICE VISIT (OUTPATIENT)
Dept: CARDIOLOGY | Facility: CLINIC | Age: 59
End: 2024-02-15
Payer: MEDICARE

## 2024-02-15 ENCOUNTER — OFFICE VISIT (OUTPATIENT)
Dept: INTERNAL MEDICINE | Facility: CLINIC | Age: 59
End: 2024-02-15
Payer: MEDICARE

## 2024-02-15 VITALS
WEIGHT: 219.81 LBS | RESPIRATION RATE: 18 BRPM | HEIGHT: 60 IN | SYSTOLIC BLOOD PRESSURE: 109 MMHG | DIASTOLIC BLOOD PRESSURE: 76 MMHG | BODY MASS INDEX: 43.15 KG/M2 | OXYGEN SATURATION: 100 % | HEART RATE: 94 BPM | TEMPERATURE: 98 F

## 2024-02-15 VITALS
BODY MASS INDEX: 43 KG/M2 | TEMPERATURE: 98 F | DIASTOLIC BLOOD PRESSURE: 73 MMHG | HEART RATE: 99 BPM | WEIGHT: 219 LBS | SYSTOLIC BLOOD PRESSURE: 110 MMHG | HEIGHT: 60 IN | RESPIRATION RATE: 19 BRPM

## 2024-02-15 DIAGNOSIS — G47.30 SLEEP APNEA, UNSPECIFIED TYPE: ICD-10-CM

## 2024-02-15 DIAGNOSIS — G47.33 OSA (OBSTRUCTIVE SLEEP APNEA): Primary | Chronic | ICD-10-CM

## 2024-02-15 DIAGNOSIS — I10 PRIMARY HYPERTENSION: Primary | Chronic | ICD-10-CM

## 2024-02-15 DIAGNOSIS — E66.01 MORBID (SEVERE) OBESITY DUE TO EXCESS CALORIES: ICD-10-CM

## 2024-02-15 DIAGNOSIS — G63 POLYNEUROPATHY ASSOCIATED WITH UNDERLYING DISEASE: ICD-10-CM

## 2024-02-15 DIAGNOSIS — Z79.4 TYPE 2 DIABETES MELLITUS WITH STAGE 3A CHRONIC KIDNEY DISEASE, WITH LONG-TERM CURRENT USE OF INSULIN: Chronic | ICD-10-CM

## 2024-02-15 DIAGNOSIS — N18.31 TYPE 2 DIABETES MELLITUS WITH STAGE 3A CHRONIC KIDNEY DISEASE, WITH LONG-TERM CURRENT USE OF INSULIN: Chronic | ICD-10-CM

## 2024-02-15 DIAGNOSIS — R06.09 DOE (DYSPNEA ON EXERTION): ICD-10-CM

## 2024-02-15 DIAGNOSIS — F41.8 MIXED ANXIETY DEPRESSIVE DISORDER: Chronic | ICD-10-CM

## 2024-02-15 DIAGNOSIS — E78.5 HYPERLIPIDEMIA LDL GOAL <70: Chronic | ICD-10-CM

## 2024-02-15 DIAGNOSIS — G47.33 OSA (OBSTRUCTIVE SLEEP APNEA): Chronic | ICD-10-CM

## 2024-02-15 DIAGNOSIS — M54.42 CHRONIC BILATERAL LOW BACK PAIN WITH BILATERAL SCIATICA: Chronic | ICD-10-CM

## 2024-02-15 DIAGNOSIS — E11.22 TYPE 2 DIABETES MELLITUS WITH STAGE 3A CHRONIC KIDNEY DISEASE, WITH LONG-TERM CURRENT USE OF INSULIN: Chronic | ICD-10-CM

## 2024-02-15 DIAGNOSIS — N18.32 CKD STAGE G3B/A2, GFR 30-44 AND ALBUMIN CREATININE RATIO 30-299 MG/G: ICD-10-CM

## 2024-02-15 DIAGNOSIS — M54.41 CHRONIC BILATERAL LOW BACK PAIN WITH BILATERAL SCIATICA: Chronic | ICD-10-CM

## 2024-02-15 DIAGNOSIS — G89.29 CHRONIC BILATERAL LOW BACK PAIN WITH BILATERAL SCIATICA: Chronic | ICD-10-CM

## 2024-02-15 PROCEDURE — 1160F RVW MEDS BY RX/DR IN RCRD: CPT | Mod: CPTII,,,

## 2024-02-15 PROCEDURE — 3052F HG A1C>EQUAL 8.0%<EQUAL 9.0%: CPT | Mod: CPTII,,, | Performed by: NURSE PRACTITIONER

## 2024-02-15 PROCEDURE — 3008F BODY MASS INDEX DOCD: CPT | Mod: CPTII,,, | Performed by: NURSE PRACTITIONER

## 2024-02-15 PROCEDURE — 1159F MED LIST DOCD IN RCRD: CPT | Mod: CPTII,,,

## 2024-02-15 PROCEDURE — 1160F RVW MEDS BY RX/DR IN RCRD: CPT | Mod: CPTII,,, | Performed by: NURSE PRACTITIONER

## 2024-02-15 PROCEDURE — 99215 OFFICE O/P EST HI 40 MIN: CPT | Mod: PBBFAC

## 2024-02-15 PROCEDURE — 1159F MED LIST DOCD IN RCRD: CPT | Mod: CPTII,,, | Performed by: NURSE PRACTITIONER

## 2024-02-15 PROCEDURE — 4010F ACE/ARB THERAPY RXD/TAKEN: CPT | Mod: CPTII,,, | Performed by: NURSE PRACTITIONER

## 2024-02-15 PROCEDURE — 4010F ACE/ARB THERAPY RXD/TAKEN: CPT | Mod: CPTII,,,

## 2024-02-15 PROCEDURE — 3078F DIAST BP <80 MM HG: CPT | Mod: CPTII,,, | Performed by: NURSE PRACTITIONER

## 2024-02-15 PROCEDURE — 99215 OFFICE O/P EST HI 40 MIN: CPT | Mod: PBBFAC,27 | Performed by: NURSE PRACTITIONER

## 2024-02-15 PROCEDURE — 99214 OFFICE O/P EST MOD 30 MIN: CPT | Mod: S$PBB,,,

## 2024-02-15 PROCEDURE — 3008F BODY MASS INDEX DOCD: CPT | Mod: CPTII,,,

## 2024-02-15 PROCEDURE — 3074F SYST BP LT 130 MM HG: CPT | Mod: CPTII,,,

## 2024-02-15 PROCEDURE — 3078F DIAST BP <80 MM HG: CPT | Mod: CPTII,,,

## 2024-02-15 PROCEDURE — 99214 OFFICE O/P EST MOD 30 MIN: CPT | Mod: S$PBB,,, | Performed by: NURSE PRACTITIONER

## 2024-02-15 PROCEDURE — 3052F HG A1C>EQUAL 8.0%<EQUAL 9.0%: CPT | Mod: CPTII,,,

## 2024-02-15 PROCEDURE — 3074F SYST BP LT 130 MM HG: CPT | Mod: CPTII,,, | Performed by: NURSE PRACTITIONER

## 2024-02-15 RX ORDER — INSULIN GLARGINE 100 [IU]/ML
30 INJECTION, SOLUTION SUBCUTANEOUS NIGHTLY
Qty: 30 ML | Refills: 3 | Status: SHIPPED | OUTPATIENT
Start: 2024-02-15 | End: 2024-05-16 | Stop reason: SDUPTHER

## 2024-02-15 NOTE — PATIENT INSTRUCTIONS
Complete FLP/CMP in approximately 3 months, or prior to next visit   Follow up in cardiology clinic in 4 months or sooner if needed  Follow up with PCP as directed

## 2024-02-15 NOTE — PROGRESS NOTES
Andreea Borrego, ALYX   [facility]  [address] [facility] [id number]      PATIENT NAME: Gloria Denis  : 1965  DATE: 2/15/24  MRN: [id number]      Reason for Visit / Chief Complaint: Follow-up (Lab review)       History of Present Illness / Problem Focused Workflow     Gloria Denis is a 58 y.o. Black or  female presents to the clinic for DM f/u. Medical problems include HTN, dyslipidemia, JORGE ALBERTO on Bipap, DM, DM neuropathy, depression, anxiety, CKD, hyperkalemia, L NPDR, GERD, chronic constipation, morbid obesity, atrophic vaginitis, Yolanda-Yolanda dz, and chronic back pain. Followed by [facility] cardio, GYN, renal and GI clinics.     Pt completed cardio visit earlier today; denies any worsening of CROUCH or SOB. Endorses mostly with moderate exertion; is not physically active d/t chronic back pain and is physically deconditioned. Continues to use cane with ambulation. BP at goal. Is using Bipap intermittently. Reports med compliance. CBGs 98-low 200s when checked a couple times as week. Has not been following ADA diet as she should. Labs reviewed with pt. Was contacted by PT and deferred until weather is warmer. Also had appt with Dr. Cannon and does not desire back surgery at this time. Denies chest pain, cough, fever, headache, dizziness, abdominal pain, nausea, vomiting, diarrhea, constipation, dysuria, SI/HI.    Review of Systems     Review of Systems     See HPI for details    Constitutional: Denies Change in appetite. Denies Chills. Denies Fever. Denies Night sweats.  Eye: Denies Blurred vision. Denies Discharge. Denies Eye pain.  ENT: Denies Decreased hearing. Denies Sore throat. Denies Swollen glands.  Respiratory: Denies Cough. Denies Shortness of breath. Admits mild Shortness of breath with exertion. Denies Wheezing.  Cardiovascular: Denies Chest pain at rest. Denies Chest pain with exertion. Denies  Irregular heartbeat. Admits Palpitations. Denies Edema.  Gastrointestinal: Denies Abdominal pain. Denies Diarrhea. Denies Nausea. Denies Vomiting. Denies Hematemesis or Hematochezia.  Genitourinary: Denies Dysuria. Denies Urinary frequency. Denies Urinary urgency. Denies Blood in urine.  Endocrine: Denies Cold intolerance. Denies Excessive thirst. Denies Heat intolerance. Denies Weight loss. Denies Weight gain.  Musculoskeletal: Admits Painful joints. Denies Weakness.  Integumentary: Denies Rash. Denies Itching. Denies Dry skin.  Neurologic: Denies Dizziness. Denies Fainting. Denies Headache.  Psychiatric: Denies Depression. Denies Anxiety. Denies Suicidal/Homicidal ideations.    All Other ROS: Negative except as stated in HPI.     Medical / Surgical / Social / Family History       ----------------------------  Anxiety disorder, unspecified  Chronic constipation  CKD (chronic kidney disease)  Depression  DM (diabetes mellitus)  Dyslipidemia  GERD (gastroesophageal reflux disease)  HTN (hypertension)  Irritable bowel syndrome  Morbid obesity  Sleep apnea, unspecified     Past Surgical History:   Procedure Laterality Date    ABSCESS DRAINAGE      I don't remember the date    ARTHROSCOPIC REMOVAL OF LOOSE BODY FROM JOINT       SECTION      CHOLECYSTECTOMY      Drainage of oral abscess      Exploration using laparoscope      FRACTURE SURGERY      UPPER GASTROINTESTINAL ENDOSCOPY         Social History     Socioeconomic History    Marital status:      Spouse name: Kp   Tobacco Use    Smoking status: Never     Passive exposure: Never    Smokeless tobacco: Never   Substance and Sexual Activity    Alcohol use: Never    Drug use: Never    Sexual activity: Not Currently     Partners: Male     Birth control/protection: None     Social Determinants of Health     Financial Resource Strain: Low Risk  (2/15/2024)    Overall Financial Resource Strain (CARDIA)     Difficulty of Paying Living Expenses: Not hard  at all   Recent Concern: Financial Resource Strain - Medium Risk (12/1/2023)    Overall Financial Resource Strain (CARDIA)     Difficulty of Paying Living Expenses: Somewhat hard   Food Insecurity: No Food Insecurity (2/15/2024)    Hunger Vital Sign     Worried About Running Out of Food in the Last Year: Never true     Ran Out of Food in the Last Year: Never true   Transportation Needs: No Transportation Needs (2/15/2024)    PRAPARE - Transportation     Lack of Transportation (Medical): No     Lack of Transportation (Non-Medical): No   Physical Activity: Inactive (2/15/2024)    Exercise Vital Sign     Days of Exercise per Week: 0 days     Minutes of Exercise per Session: 0 min   Stress: No Stress Concern Present (2/15/2024)    [employer] [place] [employer] - Occupational Stress Questionnaire     Feeling of Stress : Not at all   Social Connections: Moderately Isolated (2/15/2024)    Social Connection and Isolation Panel [NHANES]     Frequency of Communication with Friends and Family: Three times a week     Frequency of Social Gatherings with Friends and Family: Three times a week     Attends [employer] Services: 1 to 4 times per year     Active Member of Clubs or Organizations: No     Attends Club or Organization Meetings: Never     Marital Status:    Housing Stability: Low Risk  (2/15/2024)    Housing Stability Vital Sign     Unable to Pay for Housing in the Last Year: No     Number of Places Lived in the Last Year: 1     Unstable Housing in the Last Year: No        Family History   Problem Relation Age of Onset    Cancer Mother     Heart disease Mother     Rheum arthritis Mother     Arthritis Mother     Hypertension Mother     Miscarriages / Stillbirths Mother     Diabetes Father     Heart attack Father     Kidney failure Father     Stroke Father     Arthritis Father     Hypertension Father     Kidney disease Father     Vision loss Father     Cancer Brother     COPD Sister     Diabetes Sister      "COPD Sister     Diabetes Sister     Diabetes Sister     Diabetes Sister         Medications and Allergies     Medications  Current Outpatient Medications   Medication Instructions    atorvastatin (LIPITOR) 40 mg, Oral, Daily    BD CAYLA 2ND GEN PEN NEEDLE 32 gauge x " Ndle SMARTSIG:Injection Every Night    betamethasone dipropionate 0.05 % cream   See Instructions, APPLY  CREAM TOPICALLY TWICE DAILY, # 60 gm, 0 Refill(s), Pharmacy: [id number], 155, cm, Height/Length Dosing, 21 8:39:00 CDT, 112, kg, Weight Dosing, 21 11:11:00 CDT    blood sugar diagnostic Strp To check BG 2 times daily, to use with insurance preferred meter    blood-glucose meter kit To check BG 2 times daily, to use with insurance preferred meter    clotrimazole-betamethasone 1-0.05% (LOTRISONE) cream Topical (Top), 2 times daily    diclofenac sodium (VOLTAREN) 2 g, Topical (Top), 2 times daily PRN    doxycycline (VIBRAMYCIN) 100 mg, Oral, 2 times daily    DULoxetine (CYMBALTA) 60 mg, Oral, Nightly    empagliflozin (JARDIANCE) 25 mg, Oral, Daily    enalapril (VASOTEC) 2.5 mg, Oral, Daily    hydrOXYzine pamoate (VISTARIL) 25 MG Cap TAKE 1 CAPSULE BY MOUTH THREE TIMES DAILY AS NEEDED FOR ANXIETY    ketoconazole (NIZORAL) 2 % cream SMARTSIG:Sparingly Topical Twice Daily    lancets (ACCU-CHEK SOFTCLIX LANCETS) Misc USE 1 LANCET TO CHECK GLUCOSE TWICE DAILY    LANTUS SOLOSTAR U-100 INSULIN 30 Units, Subcutaneous, Nightly    LINZESS 290 mcg, Oral, Daily    LOKELMA 10 gram packet SMARTSI Packet(s) By Mouth 3 Times a Week    metoprolol tartrate (LOPRESSOR) 25 mg, Oral, 2 times daily    mupirocin (BACTROBAN) 2 % ointment Topical (Top), 3 times daily    nystatin (MYCOSTATIN) powder SMARTSI Gram(s) Topical Twice Daily    omeprazole (PRILOSEC) 40 mg, Oral, Daily    SITagliptan-metformin (JANUMET) 50-1,000 mg per tablet 1 tablet, Oral, 2 times daily    sodium bicarbonate 1,300 mg, Oral, Daily         Allergies  Review of patient's " allergies indicates:   Allergen Reactions    Aspirin        Physical Examination     /73 (BP Location: Right arm, Patient Position: Sitting, BP Method: Medium (Automatic))   Pulse 99   Temp 98.1 °F (36.7 °C) (Oral)   Resp 19   Ht 5' (1.524 m)   Wt 99.3 kg (219 lb)   BMI 42.77 kg/m²     Physical Exam  Constitutional:       Appearance: She is morbidly obese.         General: Alert and oriented, No acute distress.  Head: Normocephalic, Atraumatic.  Eye: Pupils are equal, round and reactive to light, Extraocular movements are intact, Sclera non-icteric.  Ears/Nose/Throat: Normal, Mucosa moist,Clear.  Neck/Thyroid: Supple, Non-tender, No carotid bruit, No lymphadenopathy, No JVD, Full range of motion.  Respiratory: Clear to auscultation bilaterally; No wheezes, rales or rhonchi,Non-labored respirations, Symmetrical chest wall expansion.  Cardiovascular: Regular rate and rhythm, S1/S2 normal, No murmurs, rubs or gallops.  Gastrointestinal: Soft, Non-tender, Non-distended, Normal bowel sounds, No palpable organomegaly.  Integumentary: Warm, Dry, Intact, No suspicious lesions or rashes.  Extremities: No clubbing, cyanosis or edema  Neurologic: No focal deficits, Cranial Nerves II-XII are grossly intact, Motor strength normal upper and lower extremities, Sensory exam intact.  Psychiatric: Normal interaction, Coherent speech, Appropriate affect       Results     Lab Results   Component Value Date    WBC 6.28 02/07/2024    HGB 11.7 (L) 02/07/2024    HCT 38.3 02/07/2024     02/07/2024    CHOL 195 02/07/2024    TRIG 195 (H) 02/07/2024    ALT 17 02/07/2024    AST 21 02/07/2024     02/07/2024    K 5.3 (H) 02/07/2024    CREATININE 1.49 (H) 02/07/2024    BUN 23.0 (H) 02/07/2024    CO2 24 02/07/2024    TSH 1.332 02/07/2024    HGBA1C 8.2 (H) 02/07/2024     Mammo Digital Screening Bilat w/ Jose Juan  Order: [id number]  Status: Final result       Visible to patient: Yes (not seen)       Dx: Encounter for screening  mammogram for...    0 Result Notes       1  Topic  Details    Reading Physician Reading Date Result Priority   Carla Chinchilla MD  [phone] 11/24/2023      Physician Responsible for MQSA Outcome Reason    Carla Chinchilla MD Signed      Narrative & Impression      - MAMMO DIGITAL SCREENING BILAT WITH KATHY     BILATERAL DIGITAL SCREENING MAMMOGRAM 3D/2D WITH CAD: 11/21/2023     HISTORY: Routine screening. Patient has no complaints. Family history of breast cancer.       COMPARISONS: Comparison is made to exams dated:  5/16/2022 mammogram, 4/22/2021 mammogram, 8/30/2019 mammogram, 8/29/2018 mammogram - [facility], 8/16/2017 mammogram, and 9/29/2015 mammogram - [facility].       TECHNIQUE: Digital mammography views were performed with tomosynthesis. Current study was evaluated with a Computer Aided Detection (CAD) system.      BREAST COMPOSITION: There are scattered areas of fibroglandular tissue.       FINDINGS:   No significant masses, calcifications, or other findings are seen in either breast.    There has been no significant interval change.     IMPRESSION: NEGATIVE     There is no mammographic evidence of malignancy.      RECOMMENDATIONS:   A routine screening mammogram in one year in the absence of significant clinical findings in the interval is recommended (November 2024).          Carla krishna/donna:11/24/2023 13:14:29          letter sent: Mammography Normal    Mammogram BI-RADS: 1 Negative       Assessment and Plan (including Health Maintenance)     Plan:     1. Type 2 diabetes mellitus with stage 3a chronic kidney disease, with long-term current use of insulin  A1C 8.2 at goal. Previous A1C 7.0.   Increase jardiance to 25 mg daily.  Continue levemir, janumet as prescribed.  Follow ADA diet.  Avoid soda, simple sweets, and limit rice/pasta/bread/starches and consume brown options when possible.   Maintain healthy weight with BMI  goal <30.   Perform aerobic exercise for 150 minutes per week (or 5 days a week for 30 minutes each day).   Examine feet daily.   Obtain annual dilated eye exam.  Eye exam: 8/8/23  Foot exam: 8/8/23   - Hemoglobin A1C; Future  - Microalbumin/Creatinine Ratio, Urine; Future    2. JORGE ALBERTO (obstructive sleep apnea)  Reports intermittent compliance with wearing BiPAP nightly.  Reports decreased EDS, snoring and fatigue.  Pt is benefiting from its use.  Expect lifetime use and decreased daytime sleepiness/fatigue.   Avoid excessive alcohol, smoking and overuse of sedatives at bedtime.  Weight management discussed.  Adjust sleep position as needed for increased comfort.      3. Chronic bilateral low back pain with bilateral sciatica  Continue tylenol prn.   Perform back stretches daily.   Avoid activities than increase back pain or stiffness.   Apply heating pad, ice pack, and BioFreeze as needed; alternate every 15-20 minutes.   Massage back to loosen muscles.   Continue tylenol arthritis/muscle relaxer as needed.      4. Mixed anxiety depressive disorder  Refilled  cymbalta.  Denies SI/HI.  Read positive daily meditations, avoid negative media, set healthy boundaries.  Exercise daily, keep consistent sleep pattern, eat a healthy diet.  Establish good social support, make changes to reduce stress.  Do not drink alcohol or use illicit drugs.  Reports any symptoms of suicidal/homicidal ideations or self harm immediately, go to nearest emergency room.      5. Morbid (severe) obesity due to excess calories  BMI 42. Goal BMI <30.  Aerobic exercise 150 minutes per week.  Avoid soda, simple sugars, sweets, excessive rice, pasta, potatoes or bread.   Choose brown options when available and portion control.  Limit fast foods and fried foods.   Choose complex carbs in moderation (ex: green, leafy vegetables, beans, oatmeal).  Eat plenty of fresh fruits and vegetables with lean meats daily.   Consider permanent healthy lifestyle  changes.      6. Polyneuropathy associated with underlying disease  Continue cymbalta.  Take meds as prescribed.  Control blood glucose levels.  Encouraged aerobic exercise 30 minutes per day 5 times a week.  Protect feet; check feet daily, wear proper footwear and padded socks.      7. CKD stage G3b/A2, GFR 30-44 and albumin creatinine ratio  mg/g  GFR 41.  Keep renal clinic appts as scheduled.  Renoprotective measures discussed:  -Follow renal diet (reduce intake of nuts, peanut butter, milk, cheese, dried beans, peas) and Low Sodium Diet (less than 2 grams per day).  -Control high blood pressure ( goal BP < 130/80, please record BP at home every day and bring log to next office visit)  -Exercise at least 30 minutes a day, 5 days a week.  -Maintain healthy weight.  -Decrease or stop alcohol use.  -Do not smoke.  -Stay well hydrated.  -Receive Pneumovax, Flu, and HBV vaccines if indicated.  -Do not take NSAIDs (Ibuprofen, Naproxen, Aleve, Advil, Toradol, Mobic), may take only Tylenol as needed for pain/headaches.  -Take cholesterol-lowering medications as prescribed (LDL goal <100).        Problem List Items Addressed This Visit          Neuro    Polyneuropathy associated with underlying disease (Chronic)       Psychiatric    Mixed anxiety depressive disorder (Chronic)       Renal/    CKD stage G3b/A2, GFR 30-44 and albumin creatinine ratio  mg/g    Relevant Medications    empagliflozin (JARDIANCE) 25 mg tablet    Other Relevant Orders    Vitamin D       Endocrine    BMI 40.0-44.9, adult (Chronic)    Type 2 diabetes mellitus with stage 3a chronic kidney disease, with long-term current use of insulin (Chronic)    Relevant Medications    empagliflozin (JARDIANCE) 25 mg tablet    insulin (LANTUS SOLOSTAR U-100 INSULIN) glargine 100 units/mL SubQ pen    Other Relevant Orders    Hemoglobin A1C    Microalbumin/Creatinine Ratio, Urine    Morbid (severe) obesity due to excess calories       Orthopedic     Chronic bilateral low back pain with bilateral sciatica (Chronic)       Other    JORGE ALBERTO (obstructive sleep apnea) - Primary (Chronic)         Health Maintenance Due   Topic Date Due    Pneumococcal Vaccines (Age 0-64) (1 of 2 - PCV) Never done    Shingles Vaccine (1 of 2) Never done    Diabetes Urine Screening  10/11/2022    COVID-19 Vaccine (4 - 2023-24 season) 09/01/2023       Follow up in about 3 months (around 5/15/2024) for Follow up, Diabetes, Lab Results.        Signature:  ALYX Robison  [facility]  [address] [facility] [address]

## 2024-02-19 DIAGNOSIS — E87.5 HYPERKALEMIA: ICD-10-CM

## 2024-02-19 DIAGNOSIS — N18.31 TYPE 2 DIABETES MELLITUS WITH STAGE 3A CHRONIC KIDNEY DISEASE, WITH LONG-TERM CURRENT USE OF INSULIN: ICD-10-CM

## 2024-02-19 DIAGNOSIS — Z79.4 TYPE 2 DIABETES MELLITUS WITH STAGE 3A CHRONIC KIDNEY DISEASE, WITH LONG-TERM CURRENT USE OF INSULIN: ICD-10-CM

## 2024-02-19 DIAGNOSIS — E11.22 TYPE 2 DIABETES MELLITUS WITH STAGE 3A CHRONIC KIDNEY DISEASE, WITH LONG-TERM CURRENT USE OF INSULIN: ICD-10-CM

## 2024-02-19 RX ORDER — HYDROXYZINE PAMOATE 25 MG/1
CAPSULE ORAL
Qty: 90 CAPSULE | Refills: 0 | Status: SHIPPED | OUTPATIENT
Start: 2024-02-19 | End: 2024-03-19

## 2024-02-19 RX ORDER — LANCETS
EACH MISCELLANEOUS
Qty: 100 EACH | Refills: 0 | Status: SHIPPED | OUTPATIENT
Start: 2024-02-19 | End: 2024-05-03

## 2024-03-12 ENCOUNTER — LAB VISIT (OUTPATIENT)
Dept: LAB | Facility: HOSPITAL | Age: 59
End: 2024-03-12
Attending: NURSE PRACTITIONER
Payer: MEDICARE

## 2024-03-12 ENCOUNTER — PATIENT MESSAGE (OUTPATIENT)
Dept: INTERNAL MEDICINE | Facility: CLINIC | Age: 59
End: 2024-03-12
Payer: MEDICARE

## 2024-03-12 DIAGNOSIS — Z79.4 TYPE 2 DIABETES MELLITUS WITH STAGE 3A CHRONIC KIDNEY DISEASE, WITH LONG-TERM CURRENT USE OF INSULIN: Primary | Chronic | ICD-10-CM

## 2024-03-12 DIAGNOSIS — N18.31 TYPE 2 DIABETES MELLITUS WITH STAGE 3A CHRONIC KIDNEY DISEASE, WITH LONG-TERM CURRENT USE OF INSULIN: Primary | Chronic | ICD-10-CM

## 2024-03-12 DIAGNOSIS — E11.65 UNCONTROLLED TYPE 2 DIABETES MELLITUS WITH HYPERGLYCEMIA: ICD-10-CM

## 2024-03-12 DIAGNOSIS — E11.22 TYPE 2 DIABETES MELLITUS WITH STAGE 3A CHRONIC KIDNEY DISEASE, WITH LONG-TERM CURRENT USE OF INSULIN: Primary | Chronic | ICD-10-CM

## 2024-03-12 DIAGNOSIS — N18.31 CKD STAGE G3A/A2, GFR 45-59 AND ALBUMIN CREATININE RATIO 30-299 MG/G: ICD-10-CM

## 2024-03-12 DIAGNOSIS — N18.32 CKD STAGE G3B/A2, GFR 30-44 AND ALBUMIN CREATININE RATIO 30-299 MG/G: ICD-10-CM

## 2024-03-12 LAB
ALBUMIN SERPL-MCNC: 3.6 G/DL (ref 3.5–5)
ALBUMIN/GLOB SERPL: 0.9 RATIO (ref 1.1–2)
ALP SERPL-CCNC: 93 UNIT/L (ref 40–150)
ALT SERPL-CCNC: 22 UNIT/L (ref 0–55)
AST SERPL-CCNC: 38 UNIT/L (ref 5–34)
BILIRUB SERPL-MCNC: 0.4 MG/DL
BUN SERPL-MCNC: 41 MG/DL (ref 9.8–20.1)
CALCIUM SERPL-MCNC: 9.2 MG/DL (ref 8.4–10.2)
CHLORIDE SERPL-SCNC: 107 MMOL/L (ref 98–107)
CO2 SERPL-SCNC: 16 MMOL/L (ref 22–29)
CREAT SERPL-MCNC: 1.92 MG/DL (ref 0.55–1.02)
DEPRECATED CALCIDIOL+CALCIFEROL SERPL-MC: 23.1 NG/ML (ref 30–80)
GFR SERPLBLD CREATININE-BSD FMLA CKD-EPI: 30 MLS/MIN/1.73/M2
GLOBULIN SER-MCNC: 4.1 GM/DL (ref 2.4–3.5)
GLUCOSE SERPL-MCNC: 305 MG/DL (ref 74–100)
POTASSIUM SERPL-SCNC: 4.5 MMOL/L (ref 3.5–5.1)
PROT SERPL-MCNC: 7.7 GM/DL (ref 6.4–8.3)
SODIUM SERPL-SCNC: 133 MMOL/L (ref 136–145)

## 2024-03-12 PROCEDURE — 80053 COMPREHEN METABOLIC PANEL: CPT

## 2024-03-12 PROCEDURE — 82306 VITAMIN D 25 HYDROXY: CPT

## 2024-03-12 PROCEDURE — 36415 COLL VENOUS BLD VENIPUNCTURE: CPT

## 2024-03-12 NOTE — PROGRESS NOTES
Laboratory results reviewed.  Patient has hyperglycemia and worsening metabolic acidosis.  I spoke to the patient and advised her to seek treatment in ER her suspected DKA.

## 2024-03-13 ENCOUNTER — PATIENT MESSAGE (OUTPATIENT)
Dept: INTERNAL MEDICINE | Facility: CLINIC | Age: 59
End: 2024-03-13
Payer: MEDICARE

## 2024-03-13 ENCOUNTER — HOSPITAL ENCOUNTER (EMERGENCY)
Facility: HOSPITAL | Age: 59
Discharge: HOME | End: 2024-03-13
Attending: EMERGENCY MEDICINE
Payer: MEDICARE

## 2024-03-13 ENCOUNTER — PATIENT MESSAGE (OUTPATIENT)
Dept: NEPHROLOGY | Facility: CLINIC | Age: 59
End: 2024-03-13
Payer: MEDICARE

## 2024-03-13 VITALS
SYSTOLIC BLOOD PRESSURE: 120 MMHG | OXYGEN SATURATION: 98 % | HEART RATE: 77 BPM | BODY MASS INDEX: 40.62 KG/M2 | RESPIRATION RATE: 19 BRPM | WEIGHT: 215.13 LBS | HEIGHT: 61 IN | DIASTOLIC BLOOD PRESSURE: 78 MMHG | TEMPERATURE: 99 F

## 2024-03-13 DIAGNOSIS — E55.9 VITAMIN D DEFICIENCY: Primary | ICD-10-CM

## 2024-03-13 DIAGNOSIS — E11.22 TYPE 2 DIABETES MELLITUS WITH STAGE 3A CHRONIC KIDNEY DISEASE, WITH LONG-TERM CURRENT USE OF INSULIN: Chronic | ICD-10-CM

## 2024-03-13 DIAGNOSIS — N18.9 CHRONIC KIDNEY DISEASE, UNSPECIFIED CKD STAGE: Primary | ICD-10-CM

## 2024-03-13 DIAGNOSIS — Z79.4 TYPE 2 DIABETES MELLITUS WITH STAGE 3A CHRONIC KIDNEY DISEASE, WITH LONG-TERM CURRENT USE OF INSULIN: Chronic | ICD-10-CM

## 2024-03-13 DIAGNOSIS — N18.31 TYPE 2 DIABETES MELLITUS WITH STAGE 3A CHRONIC KIDNEY DISEASE, WITH LONG-TERM CURRENT USE OF INSULIN: Chronic | ICD-10-CM

## 2024-03-13 LAB
ALBUMIN SERPL-MCNC: 3.9 G/DL (ref 3.5–5)
ALBUMIN/GLOB SERPL: 0.9 RATIO (ref 1.1–2)
ALP SERPL-CCNC: 105 UNIT/L (ref 40–150)
ALT SERPL-CCNC: 27 UNIT/L (ref 0–55)
AST SERPL-CCNC: 39 UNIT/L (ref 5–34)
B-OH-BUTYR SERPL-MCNC: 0.1 MMOL/L
BASOPHILS # BLD AUTO: 0.17 X10(3)/MCL
BASOPHILS NFR BLD AUTO: 2.4 %
BILIRUB SERPL-MCNC: 0.5 MG/DL
BUN SERPL-MCNC: 34.8 MG/DL (ref 9.8–20.1)
CALCIUM SERPL-MCNC: 9.9 MG/DL (ref 8.4–10.2)
CHLORIDE SERPL-SCNC: 108 MMOL/L (ref 98–107)
CO2 SERPL-SCNC: 17 MMOL/L (ref 22–29)
CREAT SERPL-MCNC: 1.75 MG/DL (ref 0.55–1.02)
EOSINOPHIL # BLD AUTO: 0.69 X10(3)/MCL (ref 0–0.9)
EOSINOPHIL NFR BLD AUTO: 9.7 %
ERYTHROCYTE [DISTWIDTH] IN BLOOD BY AUTOMATED COUNT: 13.5 % (ref 11.5–17)
GFR SERPLBLD CREATININE-BSD FMLA CKD-EPI: 33 MLS/MIN/1.73/M2
GLOBULIN SER-MCNC: 4.2 GM/DL (ref 2.4–3.5)
GLUCOSE SERPL-MCNC: 228 MG/DL (ref 74–100)
HCT VFR BLD AUTO: 38.2 % (ref 37–47)
HGB BLD-MCNC: 12.2 G/DL (ref 12–16)
HOLD SPECIMEN: NORMAL
IMM GRANULOCYTES # BLD AUTO: 0.01 X10(3)/MCL (ref 0–0.04)
IMM GRANULOCYTES NFR BLD AUTO: 0.1 %
LYMPHOCYTES # BLD AUTO: 3.02 X10(3)/MCL (ref 0.6–4.6)
LYMPHOCYTES NFR BLD AUTO: 42.7 %
MCH RBC QN AUTO: 27.7 PG (ref 27–31)
MCHC RBC AUTO-ENTMCNC: 31.9 G/DL (ref 33–36)
MCV RBC AUTO: 86.6 FL (ref 80–94)
MONOCYTES # BLD AUTO: 0.64 X10(3)/MCL (ref 0.1–1.3)
MONOCYTES NFR BLD AUTO: 9 %
NEUTROPHILS # BLD AUTO: 2.55 X10(3)/MCL (ref 2.1–9.2)
NEUTROPHILS NFR BLD AUTO: 36.1 %
NRBC BLD AUTO-RTO: 0 %
PLATELET # BLD AUTO: 353 X10(3)/MCL (ref 130–400)
PMV BLD AUTO: 9.6 FL (ref 7.4–10.4)
POCT GLUCOSE: 216 MG/DL (ref 70–110)
POTASSIUM SERPL-SCNC: 4.5 MMOL/L (ref 3.5–5.1)
PROT SERPL-MCNC: 8.1 GM/DL (ref 6.4–8.3)
RBC # BLD AUTO: 4.41 X10(6)/MCL (ref 4.2–5.4)
SODIUM SERPL-SCNC: 134 MMOL/L (ref 136–145)
WBC # SPEC AUTO: 7.08 X10(3)/MCL (ref 4.5–11.5)

## 2024-03-13 PROCEDURE — 82010 KETONE BODYS QUAN: CPT | Performed by: NURSE PRACTITIONER

## 2024-03-13 PROCEDURE — 99284 EMERGENCY DEPT VISIT MOD MDM: CPT | Mod: 25

## 2024-03-13 PROCEDURE — 63600175 PHARM REV CODE 636 W HCPCS: Performed by: NURSE PRACTITIONER

## 2024-03-13 PROCEDURE — 82962 GLUCOSE BLOOD TEST: CPT

## 2024-03-13 PROCEDURE — 85025 COMPLETE CBC W/AUTO DIFF WBC: CPT | Performed by: NURSE PRACTITIONER

## 2024-03-13 PROCEDURE — 96360 HYDRATION IV INFUSION INIT: CPT

## 2024-03-13 PROCEDURE — 80053 COMPREHEN METABOLIC PANEL: CPT | Performed by: NURSE PRACTITIONER

## 2024-03-13 RX ORDER — ASPIRIN 325 MG
50000 TABLET, DELAYED RELEASE (ENTERIC COATED) ORAL
Qty: 4 CAPSULE | Refills: 1 | Status: SHIPPED | OUTPATIENT
Start: 2024-03-13 | End: 2024-05-08

## 2024-03-13 RX ADMIN — SODIUM CHLORIDE, POTASSIUM CHLORIDE, SODIUM LACTATE AND CALCIUM CHLORIDE 1000 ML: 600; 310; 30; 20 INJECTION, SOLUTION INTRAVENOUS at 11:03

## 2024-03-13 NOTE — FIRST PROVIDER EVALUATION
Medical screening examination initiated.  I have conducted a focused provider triage encounter, findings are as follows:    Brief history of present illness:  patient had labwork yesterday, was called by nephrology to f/u in ER for metabolic acidosis    There were no vitals filed for this visit.    Pertinent physical exam:  deferred    Brief workup plan:  labwork    Preliminary workup initiated; this workup will be continued and followed by the physician or advanced practice provider that is assigned to the patient when roomed.

## 2024-03-13 NOTE — DISCHARGE INSTRUCTIONS
Follow up with your primary care physician in 3-5 days for follow up evaluation.  Continue home medications as directed.

## 2024-03-13 NOTE — ED PROVIDER NOTES
"Encounter Date: 3/13/2024       History     Chief Complaint   Patient presents with    abnormal labs     Sent by MD for possible "keto acidosis" from lab work that was taken 2 days ago. Reports generalized weakness and abd pain. . No distress.      Pt is a 58 y.o. female who presents to the [facility] ED requesting an evaluation for possible "ketoacidosis." Reports performing labs with her nephrologist yesterday and was phone with her results that were described as "concerning." Pt with Hx of HTN, CKD, DM, GERD, and IBS. Denies chest pain, SOB, weakness, dizziness, nausea, vomiting, or loss of bowel or bladder control. Admits to a mild "upset stomach" for a few days. Denies pain to abdomen.       Review of patient's allergies indicates:   Allergen Reactions    Aspirin      Past Medical History:   Diagnosis Date    Anxiety disorder, unspecified     Chronic constipation     CKD (chronic kidney disease)     Depression     DM (diabetes mellitus)     Dyslipidemia     GERD (gastroesophageal reflux disease)     HTN (hypertension)     Irritable bowel syndrome     Morbid obesity     Sleep apnea, unspecified      Past Surgical History:   Procedure Laterality Date    ABSCESS DRAINAGE      I don't remember the date    ARTHROSCOPIC REMOVAL OF LOOSE BODY FROM JOINT       SECTION      CHOLECYSTECTOMY      Drainage of oral abscess      Exploration using laparoscope      FRACTURE SURGERY      UPPER GASTROINTESTINAL ENDOSCOPY       Family History   Problem Relation Age of Onset    Cancer Mother     Heart disease Mother     Rheum arthritis Mother     Arthritis Mother     Hypertension Mother     Miscarriages / Stillbirths Mother     Diabetes Father     Heart attack Father     Kidney failure Father     Stroke Father     Arthritis Father     Hypertension Father     Kidney disease Father     Vision loss Father     Cancer Brother     COPD Sister     Diabetes Sister     COPD Sister     Diabetes Sister     Diabetes Sister     " Diabetes Sister      Social History     Tobacco Use    Smoking status: Never     Passive exposure: Never    Smokeless tobacco: Never   Substance Use Topics    Alcohol use: Never    Drug use: Never     Review of Systems   Constitutional:  Negative for chills, diaphoresis, fatigue and fever.   HENT:  Negative for facial swelling, rhinorrhea, sinus pressure, sinus pain, sore throat and trouble swallowing.    Respiratory:  Negative for cough, chest tightness, shortness of breath and wheezing.    Cardiovascular:  Negative for chest pain, palpitations and leg swelling.   Gastrointestinal:  Negative for abdominal pain, diarrhea, nausea and vomiting.   Genitourinary:  Negative for dysuria, flank pain, frequency, hematuria and urgency.   Musculoskeletal:  Negative for arthralgias, back pain, joint swelling and myalgias.   Skin:  Negative for color change and rash.   Neurological:  Negative for dizziness, syncope, weakness and light-headedness.   Hematological:  Does not bruise/bleed easily.   All other systems reviewed and are negative.      Physical Exam     Initial Vitals [03/13/24 1005]   BP Pulse Resp Temp SpO2   121/80 77 20 96.8 °F (36 °C) 98 %      MAP       --         Physical Exam    Nursing note and vitals reviewed.  Constitutional: She appears well-developed and well-nourished.   HENT:   Head: Normocephalic and atraumatic.   Nose: Nose normal.   Mouth/Throat: Oropharynx is clear and moist.   Eyes: Conjunctivae and EOM are normal. Pupils are equal, round, and reactive to light.   Neck: Neck supple.   Normal range of motion.  Cardiovascular:  Normal rate, regular rhythm, normal heart sounds and intact distal pulses.           Pulmonary/Chest: Effort normal and breath sounds normal. No respiratory distress. She has no wheezes. She has no rhonchi. She has no rales. She exhibits no tenderness.   Abdominal: Abdomen is soft and flat. Bowel sounds are normal. She exhibits no distension. There is no abdominal tenderness.  There is no rebound, no guarding, no tenderness at McBurney's point and negative Shah's sign. negative psoas sign  Musculoskeletal:         General: Normal range of motion.      Cervical back: Normal range of motion and neck supple.     Neurological: She is alert and oriented to person, place, and time. She has normal strength and normal reflexes.   Skin: Skin is warm and dry. Capillary refill takes less than 2 seconds.   Psychiatric: She has a normal mood and affect. Her speech is normal and behavior is normal. Judgment and thought content normal.         ED Course   Procedures  Labs Reviewed   COMPREHENSIVE METABOLIC PANEL - Abnormal; Notable for the following components:       Result Value    Sodium Level 134 (*)     Chloride 108 (*)     Carbon Dioxide 17 (*)     Glucose Level 228 (*)     Blood Urea Nitrogen 34.8 (*)     Creatinine 1.75 (*)     Globulin 4.2 (*)     Albumin/Globulin Ratio 0.9 (*)     Aspartate Aminotransferase 39 (*)     All other components within normal limits   CBC WITH DIFFERENTIAL - Abnormal; Notable for the following components:    MCHC 31.9 (*)     All other components within normal limits   POCT GLUCOSE - Abnormal; Notable for the following components:    POCT Glucose 216 (*)     All other components within normal limits   BETA - HYDROXYBUTYRATE, SERUM - Normal   CBC W/ AUTO DIFFERENTIAL    Narrative:     The following orders were created for panel order CBC auto differential.  Procedure                               Abnormality         Status                     ---------                               -----------         ------                     CBC with Differential[[id number]]       Abnormal            Final result                 Please view results for these tests on the individual orders.   EXTRA TUBES    Narrative:     The following orders were created for panel order EXTRA TUBES.  Procedure                               Abnormality         Status                     ---------                                -----------         ------                     Light Blue Top Hold[[id number]]                             Final result               Red Top Hold[[id number]]                                    Final result               Gold Top Hold[[id number]]                                   Final result                 Please view results for these tests on the individual orders.   LIGHT BLUE TOP HOLD   RED TOP HOLD   GOLD TOP HOLD          Imaging Results    None          Medications   lactated ringers bolus 999 mL (1,000 mLs Intravenous New Bag 3/13/24 1156)     Medical Decision Making  Differential:  Abdominal pain  Electrolyte imbalance  DM  CKD               ED Course as of 03/13/24 1228   Wed Mar 13, 2024   1227 Given strict ED return precautions. I have spoken with the patient and/or caregivers. I have explained the patient's condition, diagnoses and treatment plan based on the information available to me at this time. I have answered the patient's and/or caregiver's questions and addressed any concerns. The patient and/or caregivers have as good an understanding of the patient's diagnosis, condition and treatment plan as can be expected at this point. The vital signs have been stable. The patient's condition is stable and appropriate for discharge from the emergency department.      The patient will pursue further outpatient evaluation with the primary care physician or other designated or consulting physician as outlined in the discharge instructions. The patient and/or caregivers are agreeable to this plan of care and follow-up instructions have been explained in detail. The patient and/or caregivers have received these instructions in written format and have expressed an understanding of the discharge instructions. The patient and/or caregivers are aware that any significant change in condition or worsening of symptoms should prompt an immediate return to this or the closest emergency  department or a call to 911.   [JA]      ED Course User Index  [JA] Adeel Thayer Jr., ALYX                           Clinical Impression:  Final diagnoses:  [N18.9] Chronic kidney disease, unspecified CKD stage (Primary)          ED Disposition Condition    Discharge Stable          ED Prescriptions    None       Follow-up Information       Follow up With Specialties Details Why Contact Info    Andreea Borrego FNP Nurse Practitioner In 3 days  [address] [facility]  Internal Medicine [place] [address]  [phone]      [facility] - Emergency Dept Emergency Medicine In 3 days As needed, If symptoms worsen [address] [facility] [id number] [phone]             Adeel Thayer Jr., ALYX  03/13/24 1220

## 2024-03-14 ENCOUNTER — OFFICE VISIT (OUTPATIENT)
Dept: NEPHROLOGY | Facility: CLINIC | Age: 59
End: 2024-03-14
Payer: MEDICARE

## 2024-03-14 DIAGNOSIS — N18.32 CKD STAGE G3B/A1, GFR 30-44 AND ALBUMIN CREATININE RATIO <30 MG/G: Primary | ICD-10-CM

## 2024-03-14 DIAGNOSIS — I10 PRIMARY HYPERTENSION: ICD-10-CM

## 2024-03-14 DIAGNOSIS — Z79.4 TYPE 2 DIABETES MELLITUS WITH STAGE 3B CHRONIC KIDNEY DISEASE, WITH LONG-TERM CURRENT USE OF INSULIN: ICD-10-CM

## 2024-03-14 DIAGNOSIS — E66.01 SEVERE OBESITY (BMI >= 40): ICD-10-CM

## 2024-03-14 DIAGNOSIS — E11.22 TYPE 2 DIABETES MELLITUS WITH STAGE 3B CHRONIC KIDNEY DISEASE, WITH LONG-TERM CURRENT USE OF INSULIN: ICD-10-CM

## 2024-03-14 DIAGNOSIS — E87.20 METABOLIC ACIDOSIS: ICD-10-CM

## 2024-03-14 DIAGNOSIS — N18.32 TYPE 2 DIABETES MELLITUS WITH STAGE 3B CHRONIC KIDNEY DISEASE, WITH LONG-TERM CURRENT USE OF INSULIN: ICD-10-CM

## 2024-03-14 PROCEDURE — 1159F MED LIST DOCD IN RCRD: CPT | Mod: CPTII,95,, | Performed by: NURSE PRACTITIONER

## 2024-03-14 PROCEDURE — 99214 OFFICE O/P EST MOD 30 MIN: CPT | Mod: 95,,, | Performed by: NURSE PRACTITIONER

## 2024-03-14 PROCEDURE — 3052F HG A1C>EQUAL 8.0%<EQUAL 9.0%: CPT | Mod: CPTII,95,, | Performed by: NURSE PRACTITIONER

## 2024-03-14 PROCEDURE — 4010F ACE/ARB THERAPY RXD/TAKEN: CPT | Mod: CPTII,95,, | Performed by: NURSE PRACTITIONER

## 2024-03-14 PROCEDURE — 3066F NEPHROPATHY DOC TX: CPT | Mod: CPTII,95,, | Performed by: NURSE PRACTITIONER

## 2024-03-14 RX ORDER — BACLOFEN 10 MG/1
10 TABLET ORAL 2 TIMES DAILY
COMMUNITY
Start: 2024-02-23 | End: 2024-04-17 | Stop reason: SDUPTHER

## 2024-03-14 RX ORDER — SEMAGLUTIDE 0.68 MG/ML
0.5 INJECTION, SOLUTION SUBCUTANEOUS
COMMUNITY
End: 2024-03-14

## 2024-03-14 RX ORDER — SEMAGLUTIDE 0.68 MG/ML
INJECTION, SOLUTION SUBCUTANEOUS
Qty: 3 ML | Refills: 1 | Status: SHIPPED | OUTPATIENT
Start: 2024-03-14 | End: 2024-04-17 | Stop reason: ALTCHOICE

## 2024-03-14 RX ORDER — METFORMIN HYDROCHLORIDE 1000 MG/1
1000 TABLET ORAL 2 TIMES DAILY WITH MEALS
Qty: 180 TABLET | Refills: 1 | Status: SHIPPED | OUTPATIENT
Start: 2024-03-14

## 2024-03-14 NOTE — TELEPHONE ENCOUNTER
D/C janumet.  Rx metformin 100 mg BIDWM.  Rx ozempic 0.25 mg weekly x 4 weeks then increase to 0.5 mg weekly until appt.   Labs one week prior.

## 2024-03-14 NOTE — PROGRESS NOTES
This is a telemedicine note.   I have reviewed the patient's name and date of birth.  I have verbally reviewed the past medical history, active medication list, and allergies with the patient.  I have verified that this patient is in the state of [place].  Patient was treated using telemedicine, real-time audio and video, according to [facility] protocols. I, distant provider, conducted the visit from [facility] and [facility] in [place], [place]. The patient participated in the visit at a non-[facility] location. Patient was located at: Home. I am licensed in the state where the patient stated they are located. The patient stated that they understood and accepted the privacy and security risks to their information in their location.    Verbal consent to participate in interactive audio & video visit was obtained. Patient was explained that:  - Our sessions are not being recorded and that personal health information is protected   - I would evaluate the patient and recommend diagnostics and treatments based on my assessment  - The team will provide follow up care in person if/when the patient needs it.    Total time spent with patient was 25 minutes, over 50% of which was used for education and counseling regarding medical conditions, current medications including risk/benefit and side effects/adverse events, over-the-counter medication uses/doses, home self-care and contact precautions, and red flags and indications for immediate medical attention. The patient is receptive, expresses understanding and is agreeable to plan. All questions answered and concerns addressed.    [facility]  Nephrology Clinic Note    Chief complaint: Follow-up (Virtual, went to ED 3/13/24, weak)    History of present illness:   Gloria Denis is a 58 y.o. Black or  female with past medical history of chronic kidney disease, persistent hyperkalemia, diabetes mellitus type 2  (diagnosed in her late 30s), hypertension, dyslipidemia, sleep apnea, anxiety, depression, chronic back pain, GERD, chronic constipation and morbid obesity. Patient presents for follow-up appointment in nephrology clinic.  She was seen in the emergency department for weakness, abdominal pain, hyperglycemia, and metabolic acidosis on 2024.  Patient was treated with IV fluid bolus and discharged home in stable condition.  Patient states that she feels better today.  Abdominal pain has improved, she denies nausea, vomiting, or diarrhea.  Patient tells me she has not been able to take sodium bicarbonate tablets due to gastrointestinal discomfort.    Review of Systems  12 point review of systems conducted, negative except as stated in the history of present illness.    Allergies: Patient is allergic to aspirin.     Past Medical History:  has a past medical history of Anxiety disorder, unspecified, Chronic constipation, CKD (chronic kidney disease), Depression, DM (diabetes mellitus), Dyslipidemia, GERD (gastroesophageal reflux disease), HTN (hypertension), Irritable bowel syndrome, Morbid obesity, and Sleep apnea, unspecified.    Procedure History:  has a past surgical history that includes  section; Cholecystectomy; Arthroscopic removal of loose body from joint; Drainage of oral abscess; Exploration using laparoscope; Upper gastrointestinal endoscopy; Abscess drainage; and Fracture surgery ().    Family History: family history includes Arthritis in her father and mother; COPD in her sister and sister; Cancer in her brother and mother; Diabetes in her father, sister, sister, sister, and sister; Heart attack in her father; Heart disease in her mother; Hypertension in her father and mother; Kidney disease in her father; Kidney failure in her father; Miscarriages / Stillbirths in her mother; Rheum arthritis in her mother; Stroke in her father; Vision loss in her father.    Social History:  reports that  "she has never smoked. She has never been exposed to tobacco smoke. She has never used smokeless tobacco. She reports that she does not drink alcohol and does not use drugs.    Home Medications:    Current Outpatient Medications:     atorvastatin (LIPITOR) 40 MG tablet, Take 1 tablet (40 mg total) by mouth once daily., Disp: 90 tablet, Rfl: 3    baclofen (LIORESAL) 10 MG tablet, Take 10 mg by mouth 2 (two) times daily., Disp: , Rfl:     BD CAYLA 2ND GEN PEN NEEDLE 32 gauge x 5/32" Ndle, SMARTSIG:Injection Every Night, Disp: 100 each, Rfl: 3    blood sugar diagnostic Strp, To check BG 2 times daily, to use with insurance preferred meter, Disp: 100 each, Rfl: 3    blood-glucose meter kit, To check BG 2 times daily, to use with insurance preferred meter, Disp: 1 each, Rfl: 0    cholecalciferol, vitamin D3, 1,250 mcg (50,000 unit) capsule, Take 1 capsule (50,000 Units total) by mouth every 7 days., Disp: 4 capsule, Rfl: 1    DULoxetine (CYMBALTA) 60 MG capsule, Take 1 capsule (60 mg total) by mouth every evening., Disp: 90 capsule, Rfl: 3    empagliflozin (JARDIANCE) 10 mg tablet, Take 1 tablet (10 mg total) by mouth once daily., Disp: 30 tablet, Rfl: 6    enalapril (VASOTEC) 2.5 MG tablet, Take 1 tablet (2.5 mg total) by mouth once daily., Disp: 90 tablet, Rfl: 3    hydrOXYzine pamoate (VISTARIL) 25 MG Cap, TAKE 1 CAPSULE BY MOUTH THREE TIMES DAILY AS NEEDED FOR ANXIETY, Disp: 90 capsule, Rfl: 0    insulin (LANTUS SOLOSTAR U-100 INSULIN) glargine 100 units/mL SubQ pen, Inject 30 Units into the skin every evening., Disp: 30 mL, Rfl: 3    lancets (ACCU-CHEK SOFTCLIX LANCETS) Misc, USE 1  TO CHECK GLUCOSE TWICE DAILY, Disp: 100 each, Rfl: 0    LINZESS 290 mcg Cap capsule, Take 1 capsule (290 mcg total) by mouth once daily., Disp: 90 capsule, Rfl: 3    metoprolol tartrate (LOPRESSOR) 25 MG tablet, Take 1 tablet (25 mg total) by mouth 2 (two) times daily., Disp: 180 tablet, Rfl: 3    omeprazole (PRILOSEC) 40 MG capsule, Take " 1 capsule (40 mg total) by mouth once daily., Disp: 30 capsule, Rfl: 5    SITagliptan-metformin (JANUMET) 50-1,000 mg per tablet, Take 1 tablet by mouth 2 (two) times a day., Disp: 180 tablet, Rfl: 3    sodium bicarbonate 650 MG tablet, Take 1,300 mg by mouth Daily., Disp: , Rfl:     sodium zirconium cyclosilicate (LOKELMA) 10 gram packet, Take 1 packet (10 g total) by mouth once daily. MIX 1 PACKET ONCE DAILY AS DIRECTED AND  TAKE  BY  MOUTH, Disp: 90 packet, Rfl: 3    Laboratory data    Lab Results   Component Value Date    WBC 7.08 03/13/2024    HGB 12.2 03/13/2024    HCT 38.2 03/13/2024     03/13/2024     (L) 03/13/2024    K 4.5 03/13/2024    CHLORIDE 108 (H) 03/13/2024    CO2 17 (L) 03/13/2024    BUN 34.8 (H) 03/13/2024    CREATININE 1.75 (H) 03/13/2024    EGFRNORACEVR 33 03/13/2024    GLUCOSE 228 (H) 03/13/2024    CALCIUM 9.9 03/13/2024    ALKPHOS 105 03/13/2024    LABPROT 8.1 03/13/2024    ALBUMIN 3.9 03/13/2024    BILIDIR 0.2 01/24/2022    IBILI 0.30 01/24/2022    AST 39 (H) 03/13/2024    ALT 27 03/13/2024    PHOS 4.0 09/12/2023      Lab Results   Component Value Date    HGBA1C 8.2 (H) 02/07/2024    .8 (H) 09/12/2023    VCIXJUSR16LT 23.1 (L) 03/12/2024    HIV Nonreactive 05/17/2017    HEPCAB Reactive (A) 05/17/2017    HEPBSURFAG Negative 05/17/2017     Urine:  Lab Results   Component Value Date    COLORUA Yellow 02/07/2024    APPEARANCEUA Clear 02/07/2024    SGUA 1.015 02/07/2024    PHUA 5.5 02/07/2024    PROTEINUA Negative 02/07/2024    GLUCOSEUA 2+ (A) 02/07/2024    KETONESUA Negative 02/07/2024    BLOODUA Negative 02/07/2024    NITRITESUA Negative 02/07/2024    LEUKOCYTESUR Trace (A) 02/07/2024    RBCUA None Seen 02/07/2024    WBCUA 3-5 02/07/2024    BACTERIA Few (A) 02/07/2024    SQEPUA Few (A) 11/03/2023    HYALINECASTS None Seen 11/03/2023    CREATRANDUR 71.3 02/07/2024    PROTEINURINE 8.8 02/07/2024    UPROTCREA 0.1 02/07/2024         Imaging  US Retroperitoneal Limited  05/17/2021  FINDINGS: The bilateral kidneys demonstrate parenchymal thinning.  The right kidney measures 10.5 x 4.1 x 4.3 cm and is otherwise  unremarkable. There is no right hydronephrosis.  The left kidney measures 10.5 x 4.2 x 5.2 cm and is otherwise  unremarkable. There is no left hydronephrosis.  IMPRESSION:  1. Bilateral medical renal disease.  Electronically Signed By: Shai Ga MD  Date/Time Signed: 05/17/2021 14:05      Impression    ICD-10-CM ICD-9-CM   1. CKD stage G3b/A1, GFR 30-44 and albumin creatinine ratio <30 mg/g  N18.32 585.3   2. Primary hypertension  I10 401.9   3. Metabolic acidosis  E87.20 276.2   4. Type 2 diabetes mellitus with stage 3b chronic kidney disease, with long-term current use of insulin  E11.22 250.40    N18.32 585.3    Z79.4 V58.67   5. Severe obesity (BMI >= 40)  E66.01 278.01        Plan     CKD stage G3b/A1, GFR 30-44 and albumin creatinine ratio <30 mg/g    Possibly diabetic kidney disease.  Serum creatinine is stable, there is no significant proteinuria, imaging of the kidneys was essentially unremarkable. Continue risk factor management. Continue enalapril with close monitoring of serum potassium. Importance of taking all medications exactly as prescribed, especially Lokelma, discussed with the patient at length. Continue SGLT2 inhibitor therapy.  Continue renal sparing activities:    -Follow low sodium diet (2 grams a day)  -Control diabetes (goal A1C <7.0%)  -Control high blood pressure ( goal BP < 130/80, please record BP at home every day and bring log to next office visit)  -Exercise at least 30 minutes a day, 5 days a week.  -Maintain healthy weight.  -Decrease or stop alcohol use  -Do not smoke  -Stay well hydrated  -Receive Pneumovax, Flu, and HBV vaccines if indicated.  -Do not take NSAIDs (Ibuprofen, Naproxen, Aleve, Advil, Toradol, Mobic), may take only Tylenol as needed for pain/headaches.  -Take cholesterol-lowering medications as prescribed (LDL goal  <100)  Follow up in about 2 months (around 5/14/2024).    Primary hypertension  Blood pressure reading is at goal, continue current antihypertensive regimen and 2 g a day dietary sodium restriction.      Metabolic acidosis  The patient was encouraged to continue sodium bicarbonate tablets, take with meals to decrease GI symptoms.    Type 2 diabetes mellitus with stage 3b chronic kidney disease, without long-term current use of insulin  Patient believes that some of her symptoms are due to dose increase of Jardiance.  She tells me she will follow-up with her primary care provider to decrease Jardiance dose back to 10 mg daily and possibly start GLP 1 receptor agonist.  Importance of adhering to low carb diet reviewed.      Severe obesity (BMI >= 40)  Lifestyle and dietary interventions discussed, patient encouraged to maintain non-sedentary lifestyle and well-balanced diet.     Orders Placed This Encounter   Procedures    Comprehensive Metabolic Panel     Standing Status:   Future     Standing Expiration Date:   6/4/2024    Phosphorus     Standing Status:   Future     Standing Expiration Date:   6/4/2024    PTH, Intact     Standing Status:   Future     Standing Expiration Date:   6/4/2024    Protein/Creatinine Ratio, Urine     Standing Status:   Future     Standing Expiration Date:   6/4/2024     Order Specific Question:   Specimen Source     Answer:   Urine    Urinalysis, Reflex to Urine Culture     Standing Status:   Future     Standing Expiration Date:   6/4/2024     Order Specific Question:   Specimen Source     Answer:   Urine        3/14/2024  Charlene Pineda NP  [facility]

## 2024-03-18 DIAGNOSIS — F41.8 MIXED ANXIETY DEPRESSIVE DISORDER: Primary | Chronic | ICD-10-CM

## 2024-03-18 NOTE — TELEPHONE ENCOUNTER
Chart review shows pt received 90 caps x2 in February. She should not need any refills at this time. Please contact the pt or pharmacy for accuracy and let me know. Thanks

## 2024-03-19 RX ORDER — HYDROXYZINE PAMOATE 25 MG/1
CAPSULE ORAL
Qty: 90 CAPSULE | Refills: 1 | Status: SHIPPED | OUTPATIENT
Start: 2024-03-19 | End: 2024-05-06

## 2024-04-04 ENCOUNTER — PATIENT MESSAGE (OUTPATIENT)
Dept: INTERNAL MEDICINE | Facility: CLINIC | Age: 59
End: 2024-04-04
Payer: MEDICARE

## 2024-04-10 ENCOUNTER — PATIENT MESSAGE (OUTPATIENT)
Dept: INTERNAL MEDICINE | Facility: CLINIC | Age: 59
End: 2024-04-10
Payer: MEDICARE

## 2024-04-15 ENCOUNTER — PATIENT MESSAGE (OUTPATIENT)
Dept: INTERNAL MEDICINE | Facility: CLINIC | Age: 59
End: 2024-04-15
Payer: MEDICARE

## 2024-04-17 ENCOUNTER — OFFICE VISIT (OUTPATIENT)
Dept: INTERNAL MEDICINE | Facility: CLINIC | Age: 59
End: 2024-04-17
Payer: MEDICARE

## 2024-04-17 ENCOUNTER — HOSPITAL ENCOUNTER (OUTPATIENT)
Dept: RADIOLOGY | Facility: HOSPITAL | Age: 59
Discharge: HOME | End: 2024-04-17
Attending: NURSE PRACTITIONER
Payer: MEDICARE

## 2024-04-17 VITALS
HEART RATE: 93 BPM | OXYGEN SATURATION: 98 % | BODY MASS INDEX: 40.52 KG/M2 | WEIGHT: 214.63 LBS | HEIGHT: 61 IN | DIASTOLIC BLOOD PRESSURE: 70 MMHG | SYSTOLIC BLOOD PRESSURE: 103 MMHG | TEMPERATURE: 98 F

## 2024-04-17 DIAGNOSIS — N18.31 TYPE 2 DIABETES MELLITUS WITH STAGE 3A CHRONIC KIDNEY DISEASE, WITH LONG-TERM CURRENT USE OF INSULIN: Primary | Chronic | ICD-10-CM

## 2024-04-17 DIAGNOSIS — M51.36 DDD (DEGENERATIVE DISC DISEASE), LUMBAR: Chronic | ICD-10-CM

## 2024-04-17 DIAGNOSIS — Z79.4 TYPE 2 DIABETES MELLITUS WITH STAGE 3A CHRONIC KIDNEY DISEASE, WITH LONG-TERM CURRENT USE OF INSULIN: Primary | Chronic | ICD-10-CM

## 2024-04-17 DIAGNOSIS — E11.22 TYPE 2 DIABETES MELLITUS WITH STAGE 3A CHRONIC KIDNEY DISEASE, WITH LONG-TERM CURRENT USE OF INSULIN: Primary | Chronic | ICD-10-CM

## 2024-04-17 DIAGNOSIS — M48.00 CENTRAL STENOSIS OF SPINAL CANAL: Chronic | ICD-10-CM

## 2024-04-17 DIAGNOSIS — R11.2 NAUSEA AND VOMITING, UNSPECIFIED VOMITING TYPE: ICD-10-CM

## 2024-04-17 LAB — HBA1C MFR BLD: 7.4 %

## 2024-04-17 PROCEDURE — 3051F HG A1C>EQUAL 7.0%<8.0%: CPT | Mod: CPTII,,, | Performed by: NURSE PRACTITIONER

## 2024-04-17 PROCEDURE — 99214 OFFICE O/P EST MOD 30 MIN: CPT | Mod: S$PBB,,, | Performed by: NURSE PRACTITIONER

## 2024-04-17 PROCEDURE — 3074F SYST BP LT 130 MM HG: CPT | Mod: CPTII,,, | Performed by: NURSE PRACTITIONER

## 2024-04-17 PROCEDURE — 3078F DIAST BP <80 MM HG: CPT | Mod: CPTII,,, | Performed by: NURSE PRACTITIONER

## 2024-04-17 PROCEDURE — 99215 OFFICE O/P EST HI 40 MIN: CPT | Mod: PBBFAC | Performed by: NURSE PRACTITIONER

## 2024-04-17 PROCEDURE — 83036 HEMOGLOBIN GLYCOSYLATED A1C: CPT | Mod: PBBFAC | Performed by: NURSE PRACTITIONER

## 2024-04-17 PROCEDURE — 74019 RADEX ABDOMEN 2 VIEWS: CPT | Mod: TC

## 2024-04-17 PROCEDURE — 4010F ACE/ARB THERAPY RXD/TAKEN: CPT | Mod: CPTII,,, | Performed by: NURSE PRACTITIONER

## 2024-04-17 PROCEDURE — 3066F NEPHROPATHY DOC TX: CPT | Mod: CPTII,,, | Performed by: NURSE PRACTITIONER

## 2024-04-17 PROCEDURE — 1159F MED LIST DOCD IN RCRD: CPT | Mod: CPTII,,, | Performed by: NURSE PRACTITIONER

## 2024-04-17 PROCEDURE — 3008F BODY MASS INDEX DOCD: CPT | Mod: CPTII,,, | Performed by: NURSE PRACTITIONER

## 2024-04-17 PROCEDURE — 1160F RVW MEDS BY RX/DR IN RCRD: CPT | Mod: CPTII,,, | Performed by: NURSE PRACTITIONER

## 2024-04-17 RX ORDER — TRAMADOL HYDROCHLORIDE 50 MG/1
50 TABLET ORAL EVERY 12 HOURS PRN
Qty: 14 TABLET | Refills: 0 | Status: SHIPPED | OUTPATIENT
Start: 2024-04-17 | End: 2024-05-16 | Stop reason: ALTCHOICE

## 2024-04-17 RX ORDER — ERGOCALCIFEROL 1.25 MG/1
50000 CAPSULE ORAL
COMMUNITY
Start: 2024-04-12 | End: 2024-05-06

## 2024-04-17 RX ORDER — TRAMADOL HYDROCHLORIDE 50 MG/1
50 TABLET ORAL 2 TIMES DAILY PRN
COMMUNITY
Start: 2024-03-06 | End: 2024-04-17 | Stop reason: SDUPTHER

## 2024-04-17 RX ORDER — ONDANSETRON 4 MG/1
4 TABLET, ORALLY DISINTEGRATING ORAL EVERY 8 HOURS PRN
Qty: 30 TABLET | Refills: 1 | Status: SHIPPED | OUTPATIENT
Start: 2024-04-17

## 2024-04-17 RX ORDER — TIRZEPATIDE 2.5 MG/.5ML
2.5 INJECTION, SOLUTION SUBCUTANEOUS
Qty: 4 PEN | Refills: 1 | Status: SHIPPED | OUTPATIENT
Start: 2024-04-17 | End: 2024-05-16 | Stop reason: SDUPTHER

## 2024-04-17 RX ORDER — BACLOFEN 10 MG/1
10 TABLET ORAL 2 TIMES DAILY
Qty: 60 TABLET | Refills: 3 | Status: SHIPPED | OUTPATIENT
Start: 2024-04-17

## 2024-04-17 NOTE — PROGRESS NOTES
Andreea Borrego, ALYX   [facility]  [address] [facility] [address]      PATIENT NAME: Gloria Denis  : 1965  DATE: 24  MRN: [id number]      Reason for Visit / Chief Complaint: Referral (Pt wants a PT Referral)       History of Present Illness / Problem Focused Workflow     Gloria Denis is a 58 y.o. Black or  female presents to the clinic for DM f/u. Medical problems include HTN, dyslipidemia, JORGE ALBERTO on Bipap, DM, DM neuropathy, depression, anxiety, CKD, hyperkalemia, L NPDR, GERD, chronic constipation, morbid obesity, atrophic vaginitis, Yolanda-Yolanda dz, and chronic back pain. Followed by [facility] cardio, GYN, renal and GI clinics.     Today, pt continues to report ongoing nausea and vomiting with last episode this morning. States has started since beginning ozempic. Is nauseated or vomits every day or every other day. CBGs ranging mostly 80-90s when checked in the morning and had a few symptomatic readings in the 50s and 60s. Has constant burning and reflux feeling in her throat as well. Also reports constipation. Attempts low sodium/ADA diet. Reports med compliance. Is requesting referral to PT since weather has warmed up. Continues to use cane for ambulation. Denies chest pain, shortness of breath, cough, fever, headache, dizziness, abdominal pain, diarrhea, dysuria, depression, anxiety, SI/HI.      Review of Systems     Review of Systems     See HPI for details    Constitutional: Denies Change in appetite. Denies Chills. Denies Fever. Denies Night sweats.  Eye: Denies Blurred vision. Denies Discharge. Denies Eye pain.  ENT: Denies Decreased hearing. Denies Sore throat. Denies Swollen glands.  Respiratory: Denies Cough. Denies Shortness of breath. Denies Shortness of breath with exertion. Denies Wheezing.  Cardiovascular: Denies Chest pain at rest. Denies Chest pain with exertion. Denies Irregular  heartbeat. Denies Palpitations. Denies Edema.  Gastrointestinal: Denies Abdominal pain. Denies Diarrhea. Admits nausea and Vomiting. Denies Hematemesis or Hematochezia.  Genitourinary: Denies Dysuria. Denies Urinary frequency. Denies Urinary urgency. Denies Blood in urine.  Endocrine: Denies Cold intolerance. Denies Excessive thirst. Denies Heat intolerance. Denies Weight loss. Denies Weight gain.  Musculoskeletal: Admits Painful joints. Denies Weakness.  Integumentary: Denies Rash. Denies Itching. Denies Dry skin.  Neurologic: Denies Dizziness. Denies Fainting. Denies Headache.  Psychiatric: Denies Depression. Denies Anxiety. Denies Suicidal/Homicidal ideations.    All Other ROS: Negative except as stated in HPI.     Medical / Surgical / Social / Family History       -------------------------------------    Anxiety disorder, unspecified    Chronic constipation    CKD (chronic kidney disease)    Depression    DM (diabetes mellitus)    Dyslipidemia    GERD (gastroesophageal reflux disease)    HTN (hypertension)    Irritable bowel syndrome    Morbid obesity    Sleep apnea, unspecified        Past Surgical History:   Procedure Laterality Date    ABSCESS DRAINAGE      I don't remember the date    ARTHROSCOPIC REMOVAL OF LOOSE BODY FROM JOINT       SECTION      CHOLECYSTECTOMY      Drainage of oral abscess      Exploration using laparoscope      FRACTURE SURGERY  1985    UPPER GASTROINTESTINAL ENDOSCOPY         Social History     Socioeconomic History    Marital status:      Spouse name: Kp   Tobacco Use    Smoking status: Never     Passive exposure: Never    Smokeless tobacco: Never   Substance and Sexual Activity    Alcohol use: Never    Drug use: Never    Sexual activity: Not Currently     Partners: Male     Birth control/protection: None     Social Determinants of Health     Financial Resource Strain: Low Risk  (2/15/2024)    Overall Financial Resource Strain (CARDIA)     Difficulty of Paying Living  Expenses: Not hard at all   Recent Concern: Financial Resource Strain - Medium Risk (12/1/2023)    Overall Financial Resource Strain (CARDIA)     Difficulty of Paying Living Expenses: Somewhat hard   Food Insecurity: No Food Insecurity (2/15/2024)    Hunger Vital Sign     Worried About Running Out of Food in the Last Year: Never true     Ran Out of Food in the Last Year: Never true   Transportation Needs: No Transportation Needs (2/15/2024)    PRAPARE - Transportation     Lack of Transportation (Medical): No     Lack of Transportation (Non-Medical): No   Physical Activity: Inactive (2/15/2024)    Exercise Vital Sign     Days of Exercise per Week: 0 days     Minutes of Exercise per Session: 0 min   Stress: No Stress Concern Present (2/15/2024)    [employer] - Occupational Stress Questionnaire     Feeling of Stress : Not at all   Social Connections: Moderately Isolated (2/15/2024)    Social Connection and Isolation Panel [NHANES]     Frequency of Communication with Friends and Family: Three times a week     Frequency of Social Gatherings with Friends and Family: Three times a week     Attends Oriental orthodox Services: 1 to 4 times per year     Active Member of Clubs or Organizations: No     Attends Club or Organization Meetings: Never     Marital Status:    Housing Stability: Low Risk  (2/15/2024)    Housing Stability Vital Sign     Unable to Pay for Housing in the Last Year: No     Number of Places Lived in the Last Year: 1     Unstable Housing in the Last Year: No        Family History   Problem Relation Name Age of Onset    Cancer Mother Patrica Cadena     Heart disease Mother Patrica Cadena     Rheum arthritis Mother Patrica Cadena     Arthritis Mother Patrica Laneblanc     Hypertension Mother Patrica Cadena     Miscarriages / Stillbirths Mother Patrica Laneblanc     Diabetes Father Devon Cadena     Heart attack Father Devon Cadena     Kidney failure Father  "Devon Cadena     Stroke Father Devon Cadena     Arthritis Father Devon Cadena     Hypertension Father Devon Cadena     Kidney disease Father Devon Cadena     Vision loss Father Devon Cadena     Cancer Brother Jalil Cadena     COPD Sister Tanya Cadena     Diabetes Sister Tanya Cadena     COPD Sister Staci Cadena Dominique     Diabetes Sister Staci Cadena Dominique     Diabetes Sister Kae Cadena Laws     Diabetes Sister Jeramyry Callie Miguel         Medications and Allergies     Medications  Current Outpatient Medications   Medication Instructions    atorvastatin (LIPITOR) 40 mg, Oral, Daily    baclofen (LIORESAL) 10 mg, Oral, 2 times daily    BD CAYLA 2ND GEN PEN NEEDLE 32 gauge x 5/32" Ndle SMARTSIG:Injection Every Night    blood sugar diagnostic Strp To check BG 2 times daily, to use with insurance preferred meter    blood-glucose meter kit To check BG 2 times daily, to use with insurance preferred meter    cholecalciferol (vitamin D3) 50,000 Units, Oral, Every 7 days    DULoxetine (CYMBALTA) 60 mg, Oral, Nightly    empagliflozin (JARDIANCE) 10 mg, Oral, Daily    enalapril (VASOTEC) 2.5 mg, Oral, Daily    ergocalciferol (ERGOCALCIFEROL) 50,000 Units, Oral, Every 7 days    hydrOXYzine pamoate (VISTARIL) 25 MG Cap TAKE 1 CAPSULE BY MOUTH THREE TIMES DAILY AS NEEDED FOR ANXIETY    lancets (ACCU-CHEK SOFTCLIX LANCETS) Misc USE 1  TO CHECK GLUCOSE TWICE DAILY    LANTUS SOLOSTAR U-100 INSULIN 30 Units, Subcutaneous, Nightly    LINZESS 290 mcg, Oral, Daily    metFORMIN (GLUCOPHAGE) 1,000 mg, Oral, 2 times daily with meals    metoprolol tartrate (LOPRESSOR) 25 mg, Oral, 2 times daily    MOUNJARO 2.5 mg, Subcutaneous, Every 7 days    omeprazole (PRILOSEC) 40 mg, Oral, Daily    ondansetron (ZOFRAN-ODT) 4 mg, Oral, Every 8 hours PRN    sodium bicarbonate 1,300 mg, Oral, Daily    sodium zirconium cyclosilicate (LOKELMA) 10 g, Oral, Daily, MIX 1 PACKET ONCE DAILY AS DIRECTED AND  TAKE  BY  MOUTH " "   traMADoL (ULTRAM) 50 mg, Oral, Every 12 hours PRN         Allergies  Review of patient's allergies indicates:   Allergen Reactions    Aspirin        Physical Examination     /70 (BP Location: Right arm, Patient Position: Sitting, BP Method: Medium (Automatic))   Pulse 93   Temp 98.2 °F (36.8 °C) (Oral)   Ht 5' 1" (1.549 m)   Wt 97.3 kg (214 lb 9.6 oz)   SpO2 98%   BMI 40.55 kg/m²     Physical Exam  Musculoskeletal:      Thoracic back: Spasms and tenderness present. Decreased range of motion.      Lumbar back: Spasms and tenderness present. Decreased range of motion. Positive right straight leg raise test and positive left straight leg raise test.        Back:          General: Alert and oriented, No acute distress.  Head: Normocephalic, Atraumatic.  Eye: Pupils are equal, round and reactive to light, Extraocular movements are intact, Sclera non-icteric.  Ears/Nose/Throat: Normal, Mucosa moist,Clear.  Neck/Thyroid: Supple, Non-tender, No carotid bruit, No lymphadenopathy, No JVD, Full range of motion.  Respiratory: Clear to auscultation bilaterally; No wheezes, rales or rhonchi,Non-labored respirations, Symmetrical chest wall expansion.  Cardiovascular: Regular rate and rhythm, S1/S2 normal, No murmurs, rubs or gallops.  Gastrointestinal: Soft, Non-tender, Non-distended, Normal bowel sounds, No palpable organomegaly.  Integumentary: Warm, Dry, Intact, No suspicious lesions or rashes.  Extremities: No clubbing, cyanosis or edema  Neurologic: No focal deficits, Cranial Nerves II-XII are grossly intact, Motor strength normal upper and lower extremities, Sensory exam intact.  Psychiatric: Normal interaction, Coherent speech, Appropriate affect       Results     Lab Results   Component Value Date    WBC 7.08 03/13/2024    HGB 12.2 03/13/2024    HCT 38.2 03/13/2024     03/13/2024    CHOL 195 02/07/2024    TRIG 195 (H) 02/07/2024    ALT 27 03/13/2024    AST 39 (H) 03/13/2024     (L) 03/13/2024    " K 4.5 03/13/2024    CREATININE 1.75 (H) 03/13/2024    BUN 34.8 (H) 03/13/2024    CO2 17 (L) 03/13/2024    TSH 1.332 02/07/2024    HGBA1C 8.2 (H) 02/07/2024   POCT HEMOGLOBIN A1C  Order: [id number]  Status: Final result       Visible to patient: Yes (not seen)       Next appt: 05/16/2024 at 08:00 AM in Internal Medicine (ALYX Robison)       Dx: Type 2 diabetes mellitus with stage 3...    0 Result Notes       1  Topic       Component Ref Range & Units 10:39   Hemoglobin A1C, POC % 7.4            Assessment and Plan (including Health Maintenance)     Plan:     1. Type 2 diabetes mellitus with stage 3a chronic kidney disease, with long-term current use of insulin  A1C 7.4 at goal. Previous A1C 8.2.   D/C ozempic d/t multiple SE's.  Rx mounjaro 2.5 mg weekly; take on same day of week as ozempic (Sunday).  Continue jardiance 10 mg and metformin.  Continue lantus 30 mg at bedtime.   Continue medications as prescribed.  Follow ADA diet.  Avoid soda, simple sweets, and limit rice/pasta/bread/starches and consume brown options when possible.   Maintain healthy weight with BMI goal <30.   Perform aerobic exercise for 150 minutes per week (or 5 days a week for 30 minutes each day).   Examine feet daily.   Obtain annual dilated eye exam.  Eye exam: 8/8/23  Foot exam:8/8/23    - POCT HEMOGLOBIN A1C  - tirzepatide (MOUNJARO) 2.5 mg/0.5 mL PnIj; Inject 2.5 mg into the skin every 7 days.  Dispense: 4 Pen; Refill: 1    2. DDD (degenerative disc disease), lumbar  Referral to PT to eval/treat.   Continue tylenol prn.   Perform back stretches daily.   Avoid activities than increase back pain or stiffness.   Apply heating pad, ice pack, and BioFreeze as needed; alternate every 15-20 minutes.   Massage back to loosen muscles.   Continue tylenol arthritis/muscle relaxer as needed.  - Ambulatory referral/consult to Physical/Occupational Therapy; Future    3. Central stenosis of spinal canal  Referral to PT to  eval/treat.  Continue tylenol prn.   Perform back stretches daily.   Avoid activities than increase back pain or stiffness.   Apply heating pad, ice pack, and BioFreeze as needed; alternate every 15-20 minutes.   Massage back to loosen muscles.   Continue tylenol arthritis/muscle relaxer as needed.  - Ambulatory referral/consult to Physical/Occupational Therapy; Future    4. Nausea and vomiting, unspecified vomiting type  KUB today.  D/C ozempic d/t SE's.  Rx zofran prn.    - X-Ray Abdomen Flat And Erect; Future      Problem List Items Addressed This Visit          Neuro    DDD (degenerative disc disease), lumbar (Chronic)    Relevant Medications    baclofen (LIORESAL) 10 MG tablet    Other Relevant Orders    Ambulatory referral/consult to Physical/Occupational Therapy    Central stenosis of spinal canal (Chronic)    Relevant Medications    baclofen (LIORESAL) 10 MG tablet    Other Relevant Orders    Ambulatory referral/consult to Physical/Occupational Therapy       Endocrine    Type 2 diabetes mellitus with stage 3a chronic kidney disease, with long-term current use of insulin - Primary (Chronic)    Relevant Medications    tirzepatide (MOUNJARO) 2.5 mg/0.5 mL PnIj    Other Relevant Orders    POCT HEMOGLOBIN A1C (Completed)     Other Visit Diagnoses       Nausea and vomiting, unspecified vomiting type        Relevant Orders    X-Ray Abdomen Flat And Erect              Health Maintenance Due   Topic Date Due    Pneumococcal Vaccines (Age 0-64) (1 of 2 - PCV) Never done    Shingles Vaccine (1 of 2) Never done    Diabetes Urine Screening  10/11/2022    COVID-19 Vaccine (4 - 2023-24 season) 09/01/2023       Follow up if symptoms worsen or fail to improve, for Keep appt as scheduled in May..        Signature:  ALYX Robison  [facility]  [address] [address]

## 2024-04-19 ENCOUNTER — PATIENT MESSAGE (OUTPATIENT)
Dept: INTERNAL MEDICINE | Facility: CLINIC | Age: 59
End: 2024-04-19
Payer: MEDICARE

## 2024-04-26 ENCOUNTER — PATIENT MESSAGE (OUTPATIENT)
Dept: INTERNAL MEDICINE | Facility: CLINIC | Age: 59
End: 2024-04-26
Payer: MEDICARE

## 2024-05-03 DIAGNOSIS — Z79.4 TYPE 2 DIABETES MELLITUS WITH STAGE 3A CHRONIC KIDNEY DISEASE, WITH LONG-TERM CURRENT USE OF INSULIN: ICD-10-CM

## 2024-05-03 DIAGNOSIS — N18.31 TYPE 2 DIABETES MELLITUS WITH STAGE 3A CHRONIC KIDNEY DISEASE, WITH LONG-TERM CURRENT USE OF INSULIN: ICD-10-CM

## 2024-05-03 DIAGNOSIS — E11.22 TYPE 2 DIABETES MELLITUS WITH STAGE 3A CHRONIC KIDNEY DISEASE, WITH LONG-TERM CURRENT USE OF INSULIN: ICD-10-CM

## 2024-05-03 DIAGNOSIS — F41.8 MIXED ANXIETY DEPRESSIVE DISORDER: Chronic | ICD-10-CM

## 2024-05-03 RX ORDER — LANCETS
EACH MISCELLANEOUS
Qty: 100 EACH | Refills: 6 | Status: SHIPPED | OUTPATIENT
Start: 2024-05-03

## 2024-05-06 ENCOUNTER — PATIENT MESSAGE (OUTPATIENT)
Dept: INTERNAL MEDICINE | Facility: CLINIC | Age: 59
End: 2024-05-06
Payer: MEDICARE

## 2024-05-06 RX ORDER — ERGOCALCIFEROL 1.25 MG/1
50000 CAPSULE ORAL
Qty: 12 CAPSULE | Refills: 3 | Status: SHIPPED | OUTPATIENT
Start: 2024-05-06 | End: 2024-05-08 | Stop reason: SDUPTHER

## 2024-05-06 RX ORDER — HYDROXYZINE PAMOATE 25 MG/1
CAPSULE ORAL
Qty: 90 CAPSULE | Refills: 1 | Status: SHIPPED | OUTPATIENT
Start: 2024-05-06

## 2024-05-08 RX ORDER — ERGOCALCIFEROL 1.25 MG/1
50000 CAPSULE ORAL
Qty: 12 CAPSULE | Refills: 3 | Status: SHIPPED | OUTPATIENT
Start: 2024-05-08

## 2024-05-10 ENCOUNTER — TELEPHONE (OUTPATIENT)
Dept: INTERNAL MEDICINE | Facility: CLINIC | Age: 59
End: 2024-05-10
Payer: MEDICARE

## 2024-05-10 DIAGNOSIS — Z79.4 TYPE 2 DIABETES MELLITUS WITH STAGE 3A CHRONIC KIDNEY DISEASE, WITH LONG-TERM CURRENT USE OF INSULIN: Primary | Chronic | ICD-10-CM

## 2024-05-10 DIAGNOSIS — N18.31 TYPE 2 DIABETES MELLITUS WITH STAGE 3A CHRONIC KIDNEY DISEASE, WITH LONG-TERM CURRENT USE OF INSULIN: Primary | Chronic | ICD-10-CM

## 2024-05-10 DIAGNOSIS — E11.22 TYPE 2 DIABETES MELLITUS WITH STAGE 3A CHRONIC KIDNEY DISEASE, WITH LONG-TERM CURRENT USE OF INSULIN: Primary | Chronic | ICD-10-CM

## 2024-05-10 NOTE — TELEPHONE ENCOUNTER
----- Message from Carla Hargrove LPN sent at 5/9/2024  3:11 PM CDT -----  Regarding: FW: advice  I dont see any lab orders in, do you want her to have labs done prior to appt next week?  ----- Message -----  From: Halima Montanez  Sent: 5/9/2024  11:34 AM CDT  To: [facility] Staff  Subject: advice                                           Who Called: Gloria Denis    Caller is requesting assistance/information from provider's office.    Patient's Preferred Phone Number on File: [phone]     Additional Information: stated that she has an upcoming appt with pcp and wanted to know if she has labs to do before. Please advise

## 2024-05-13 ENCOUNTER — LAB VISIT (OUTPATIENT)
Dept: LAB | Facility: HOSPITAL | Age: 59
End: 2024-05-13
Attending: NURSE PRACTITIONER
Payer: MEDICARE

## 2024-05-13 DIAGNOSIS — E78.5 HYPERLIPIDEMIA LDL GOAL <70: Chronic | ICD-10-CM

## 2024-05-13 DIAGNOSIS — E11.22 TYPE 2 DIABETES MELLITUS WITH STAGE 3A CHRONIC KIDNEY DISEASE, WITH LONG-TERM CURRENT USE OF INSULIN: Chronic | ICD-10-CM

## 2024-05-13 DIAGNOSIS — N18.31 TYPE 2 DIABETES MELLITUS WITH STAGE 3A CHRONIC KIDNEY DISEASE, WITH LONG-TERM CURRENT USE OF INSULIN: Chronic | ICD-10-CM

## 2024-05-13 DIAGNOSIS — N18.32 CKD STAGE G3B/A1, GFR 30-44 AND ALBUMIN CREATININE RATIO <30 MG/G: ICD-10-CM

## 2024-05-13 DIAGNOSIS — Z79.4 TYPE 2 DIABETES MELLITUS WITH STAGE 3A CHRONIC KIDNEY DISEASE, WITH LONG-TERM CURRENT USE OF INSULIN: Chronic | ICD-10-CM

## 2024-05-13 LAB
ALBUMIN SERPL-MCNC: 3.5 G/DL (ref 3.5–5)
ALBUMIN/GLOB SERPL: 1 RATIO (ref 1.1–2)
ALP SERPL-CCNC: 75 UNIT/L (ref 40–150)
ALT SERPL-CCNC: 19 UNIT/L (ref 0–55)
AST SERPL-CCNC: 29 UNIT/L (ref 5–34)
BACTERIA #/AREA URNS AUTO: ABNORMAL /HPF
BILIRUB SERPL-MCNC: 0.4 MG/DL
BILIRUB UR QL STRIP.AUTO: ABNORMAL
BUN SERPL-MCNC: 15 MG/DL (ref 9.8–20.1)
CALCIUM SERPL-MCNC: 9.3 MG/DL (ref 8.4–10.2)
CHLORIDE SERPL-SCNC: 111 MMOL/L (ref 98–107)
CHOLEST SERPL-MCNC: 119 MG/DL
CHOLEST/HDLC SERPL: 4 {RATIO} (ref 0–5)
CLARITY UR: ABNORMAL
CO2 SERPL-SCNC: 23 MMOL/L (ref 22–29)
COLOR UR AUTO: YELLOW
CREAT SERPL-MCNC: 1.23 MG/DL (ref 0.55–1.02)
CREAT UR-MCNC: 266.4 MG/DL (ref 45–106)
CREAT UR-MCNC: 267.2 MG/DL (ref 45–106)
EST. AVERAGE GLUCOSE BLD GHB EST-MCNC: 142.7 MG/DL
GFR SERPLBLD CREATININE-BSD FMLA CKD-EPI: 51 ML/MIN/1.73/M2
GLOBULIN SER-MCNC: 3.5 GM/DL (ref 2.4–3.5)
GLUCOSE SERPL-MCNC: 90 MG/DL (ref 74–100)
GLUCOSE UR QL STRIP: NEGATIVE
HBA1C MFR BLD: 6.6 %
HDLC SERPL-MCNC: 32 MG/DL (ref 35–60)
HGB UR QL STRIP: ABNORMAL
KETONES UR QL STRIP: ABNORMAL
LDLC SERPL CALC-MCNC: 66 MG/DL (ref 50–140)
LEUKOCYTE ESTERASE UR QL STRIP: ABNORMAL
MICROALBUMIN UR-MCNC: 94.2 UG/ML
MICROALBUMIN/CREAT RATIO PNL UR: 35.3 MG/GM CR (ref 0–30)
MUCOUS THREADS URNS QL MICRO: ABNORMAL /LPF
NITRITE UR QL STRIP: POSITIVE
PH UR STRIP: 5 [PH]
PHOSPHATE SERPL-MCNC: 3.7 MG/DL (ref 2.3–4.7)
POTASSIUM SERPL-SCNC: 4.2 MMOL/L (ref 3.5–5.1)
PROT SERPL-MCNC: 7 GM/DL (ref 6.4–8.3)
PROT UR QL STRIP: ABNORMAL
PROT UR STRIP-MCNC: 35.7 MG/DL
PTH-INTACT SERPL-MCNC: 59.2 PG/ML (ref 8.7–77)
RBC #/AREA URNS AUTO: ABNORMAL /HPF
SODIUM SERPL-SCNC: 140 MMOL/L (ref 136–145)
SP GR UR STRIP.AUTO: 1.02 (ref 1–1.03)
SQUAMOUS #/AREA URNS AUTO: ABNORMAL /HPF
TRIGL SERPL-MCNC: 103 MG/DL (ref 37–140)
URINE PROTEIN/CREATININE RATIO (OLG): 0.1
UROBILINOGEN UR STRIP-ACNC: 0.2
VLDLC SERPL CALC-MCNC: 21 MG/DL
WBC #/AREA URNS AUTO: ABNORMAL /HPF

## 2024-05-13 PROCEDURE — 84100 ASSAY OF PHOSPHORUS: CPT

## 2024-05-13 PROCEDURE — 87086 URINE CULTURE/COLONY COUNT: CPT

## 2024-05-13 PROCEDURE — 80053 COMPREHEN METABOLIC PANEL: CPT

## 2024-05-13 PROCEDURE — 83970 ASSAY OF PARATHORMONE: CPT

## 2024-05-13 PROCEDURE — 82570 ASSAY OF URINE CREATININE: CPT

## 2024-05-13 PROCEDURE — 36415 COLL VENOUS BLD VENIPUNCTURE: CPT

## 2024-05-13 PROCEDURE — 84156 ASSAY OF PROTEIN URINE: CPT

## 2024-05-13 PROCEDURE — 83036 HEMOGLOBIN GLYCOSYLATED A1C: CPT

## 2024-05-13 PROCEDURE — 82043 UR ALBUMIN QUANTITATIVE: CPT

## 2024-05-13 PROCEDURE — 81001 URINALYSIS AUTO W/SCOPE: CPT

## 2024-05-13 PROCEDURE — 80061 LIPID PANEL: CPT

## 2024-05-16 ENCOUNTER — OFFICE VISIT (OUTPATIENT)
Dept: INTERNAL MEDICINE | Facility: CLINIC | Age: 59
End: 2024-05-16
Payer: MEDICARE

## 2024-05-16 VITALS
DIASTOLIC BLOOD PRESSURE: 69 MMHG | RESPIRATION RATE: 17 BRPM | TEMPERATURE: 98 F | WEIGHT: 217 LBS | HEART RATE: 79 BPM | OXYGEN SATURATION: 100 % | HEIGHT: 61 IN | BODY MASS INDEX: 40.97 KG/M2 | SYSTOLIC BLOOD PRESSURE: 100 MMHG

## 2024-05-16 DIAGNOSIS — N30.00 ACUTE CYSTITIS WITHOUT HEMATURIA: ICD-10-CM

## 2024-05-16 DIAGNOSIS — E66.01 CLASS 3 SEVERE OBESITY DUE TO EXCESS CALORIES WITH SERIOUS COMORBIDITY AND BODY MASS INDEX (BMI) OF 40.0 TO 44.9 IN ADULT: Chronic | ICD-10-CM

## 2024-05-16 DIAGNOSIS — E11.22 TYPE 2 DIABETES MELLITUS WITH STAGE 3A CHRONIC KIDNEY DISEASE, WITH LONG-TERM CURRENT USE OF INSULIN: Primary | Chronic | ICD-10-CM

## 2024-05-16 DIAGNOSIS — Z79.4 TYPE 2 DIABETES MELLITUS WITH STAGE 3A CHRONIC KIDNEY DISEASE, WITH LONG-TERM CURRENT USE OF INSULIN: Primary | Chronic | ICD-10-CM

## 2024-05-16 DIAGNOSIS — N18.31 TYPE 2 DIABETES MELLITUS WITH STAGE 3A CHRONIC KIDNEY DISEASE, WITH LONG-TERM CURRENT USE OF INSULIN: Primary | Chronic | ICD-10-CM

## 2024-05-16 PROBLEM — E66.813 CLASS 3 SEVERE OBESITY DUE TO EXCESS CALORIES WITH SERIOUS COMORBIDITY AND BODY MASS INDEX (BMI) OF 40.0 TO 44.9 IN ADULT: Status: ACTIVE | Noted: 2023-09-28

## 2024-05-16 PROBLEM — E66.813 CLASS 3 SEVERE OBESITY DUE TO EXCESS CALORIES WITH SERIOUS COMORBIDITY AND BODY MASS INDEX (BMI) OF 40.0 TO 44.9 IN ADULT: Chronic | Status: ACTIVE | Noted: 2023-09-28

## 2024-05-16 LAB — BACTERIA UR CULT: ABNORMAL

## 2024-05-16 PROCEDURE — 3008F BODY MASS INDEX DOCD: CPT | Mod: CPTII,,, | Performed by: NURSE PRACTITIONER

## 2024-05-16 PROCEDURE — 1160F RVW MEDS BY RX/DR IN RCRD: CPT | Mod: CPTII,,, | Performed by: NURSE PRACTITIONER

## 2024-05-16 PROCEDURE — 3044F HG A1C LEVEL LT 7.0%: CPT | Mod: CPTII,,, | Performed by: NURSE PRACTITIONER

## 2024-05-16 PROCEDURE — 3078F DIAST BP <80 MM HG: CPT | Mod: CPTII,,, | Performed by: NURSE PRACTITIONER

## 2024-05-16 PROCEDURE — 99215 OFFICE O/P EST HI 40 MIN: CPT | Mod: PBBFAC | Performed by: NURSE PRACTITIONER

## 2024-05-16 PROCEDURE — 3074F SYST BP LT 130 MM HG: CPT | Mod: CPTII,,, | Performed by: NURSE PRACTITIONER

## 2024-05-16 PROCEDURE — 99214 OFFICE O/P EST MOD 30 MIN: CPT | Mod: S$PBB,,, | Performed by: NURSE PRACTITIONER

## 2024-05-16 PROCEDURE — 3060F POS MICROALBUMINURIA REV: CPT | Mod: CPTII,,, | Performed by: NURSE PRACTITIONER

## 2024-05-16 PROCEDURE — 3066F NEPHROPATHY DOC TX: CPT | Mod: CPTII,,, | Performed by: NURSE PRACTITIONER

## 2024-05-16 PROCEDURE — 4010F ACE/ARB THERAPY RXD/TAKEN: CPT | Mod: CPTII,,, | Performed by: NURSE PRACTITIONER

## 2024-05-16 PROCEDURE — 1159F MED LIST DOCD IN RCRD: CPT | Mod: CPTII,,, | Performed by: NURSE PRACTITIONER

## 2024-05-16 RX ORDER — TIRZEPATIDE 2.5 MG/.5ML
2.5 INJECTION, SOLUTION SUBCUTANEOUS
Qty: 4 PEN | Refills: 3 | Status: SHIPPED | OUTPATIENT
Start: 2024-05-16 | End: 2024-06-03 | Stop reason: SDUPTHER

## 2024-05-16 RX ORDER — INSULIN GLARGINE 100 [IU]/ML
20 INJECTION, SOLUTION SUBCUTANEOUS NIGHTLY
Qty: 20 ML | Refills: 1 | Status: SHIPPED | OUTPATIENT
Start: 2024-05-16 | End: 2024-05-31 | Stop reason: SDUPTHER

## 2024-05-16 RX ORDER — NITROFURANTOIN 25; 75 MG/1; MG/1
100 CAPSULE ORAL 2 TIMES DAILY
Qty: 14 CAPSULE | Refills: 0 | Status: SHIPPED | OUTPATIENT
Start: 2024-05-16 | End: 2024-05-23

## 2024-05-16 NOTE — PROGRESS NOTES
Andreea Borrego, ALYX   [facility]  [address] [facility] [address]      PATIENT NAME: Gloria Denis  : 1965  DATE: 24  MRN: [id number]      Reason for Visit / Chief Complaint: Follow-up, lab result review, and Diabetes       History of Present Illness / Problem Focused Workflow     Gloria Denis is a 58 y.o. Black or  female presents to the clinic for DM f/u.  Medical problems include HTN, dyslipidemia, JORGE ALBERTO on Bipap, DM, DM neuropathy, depression, anxiety, CKD, hyperkalemia, L NPDR, GERD, chronic constipation, morbid obesity, atrophic vaginitis, Yolanda-Yolanda dz, and chronic back pain. Followed by [facility] cardio, GYN, renal and GI clinics.     Today, pt reports only one episode of N/V with initial start of mounjaro. Reports is tolerating well now. Reports continued med compliance. CBGs ranging 59-95 when when checked in the morning prior to decreasing lantus to 25 units at bedtime. Is attempting ADA/low sodium diet. Appetite is suppressed. Labs reviewed with pt. Denies chest pain, shortness of breath, cough, fever, headache, dizziness, weakness, abdominal pain, nausea, vomiting, diarrhea, constipation, dysuria, depression, anxiety, SI/HI.    Review of Systems     Review of Systems     See HPI for details    Constitutional: Denies Change in appetite. Denies Chills. Denies Fever. Denies Night sweats.  Eye: Denies Blurred vision. Denies Discharge. Denies Eye pain.  ENT: Denies Decreased hearing. Denies Sore throat. Denies Swollen glands.  Respiratory: Denies Cough. Denies Shortness of breath. Denies Shortness of breath with exertion. Denies Wheezing.  Cardiovascular: Denies Chest pain at rest. Denies Chest pain with exertion. Denies Irregular heartbeat. Denies Palpitations. Denies Edema.  Gastrointestinal: Denies Abdominal pain. Denies Diarrhea. Denies Nausea. Denies Vomiting. Denies Hematemesis or  Hematochezia.  Genitourinary: Denies Dysuria. Denies Urinary frequency. Denies Urinary urgency. Denies Blood in urine.  Endocrine: Denies Cold intolerance. Denies Excessive thirst. Denies Heat intolerance. Denies Weight loss. Denies Weight gain.  Musculoskeletal: Admits Painful joints. Admits Weakness.  Integumentary: Denies Rash. Denies Itching. Denies Dry skin.  Neurologic: Denies Dizziness. Denies Fainting. Denies Headache.  Psychiatric: Denies Depression. Denies Anxiety. Denies Suicidal/Homicidal ideations.    All Other ROS: Negative except as stated in HPI.     Medical / Surgical / Social / Family History       -------------------------------------    Anxiety disorder, unspecified    Chronic constipation    CKD (chronic kidney disease)    Depression    DM (diabetes mellitus)    Dyslipidemia    GERD (gastroesophageal reflux disease)    HTN (hypertension)    Irritable bowel syndrome    Morbid obesity    Sleep apnea, unspecified        Past Surgical History:   Procedure Laterality Date    ABSCESS DRAINAGE      I don't remember the date    ARTHROSCOPIC REMOVAL OF LOOSE BODY FROM JOINT       SECTION      CHOLECYSTECTOMY      Drainage of oral abscess      Exploration using laparoscope      FRACTURE SURGERY  1985    UPPER GASTROINTESTINAL ENDOSCOPY         Social History     Socioeconomic History    Marital status:      Spouse name: Kp   Tobacco Use    Smoking status: Never     Passive exposure: Never    Smokeless tobacco: Never   Substance and Sexual Activity    Alcohol use: Never    Drug use: Never    Sexual activity: Not Currently     Partners: Male     Birth control/protection: None     Social Determinants of Health     Financial Resource Strain: Low Risk  (2/15/2024)    Overall Financial Resource Strain (CARDIA)     Difficulty of Paying Living Expenses: Not hard at all   Recent Concern: Financial Resource Strain - Medium Risk (2023)    Overall Financial Resource Strain (CARDIA)      Difficulty of Paying Living Expenses: Somewhat hard   Food Insecurity: No Food Insecurity (2/15/2024)    Hunger Vital Sign     Worried About Running Out of Food in the Last Year: Never true     Ran Out of Food in the Last Year: Never true   Transportation Needs: No Transportation Needs (2/15/2024)    PRAPARE - Transportation     Lack of Transportation (Medical): No     Lack of Transportation (Non-Medical): No   Physical Activity: Inactive (2/15/2024)    Exercise Vital Sign     Days of Exercise per Week: 0 days     Minutes of Exercise per Session: 0 min   Stress: No Stress Concern Present (2/15/2024)    [employer] [place] [employer] - Occupational Stress Questionnaire     Feeling of Stress : Not at all   Housing Stability: Low Risk  (2/15/2024)    Housing Stability Vital Sign     Unable to Pay for Housing in the Last Year: No     Number of Places Lived in the Last Year: 1     Unstable Housing in the Last Year: No        Family History   Problem Relation Name Age of Onset    Cancer Mother Patrica Cadena     Heart disease Mother Patrica ROSS Cadena     Rheum arthritis Mother Patrica Laneblanc     Arthritis Mother Patrica TOSCANOCecil Cadena     Hypertension Mother Patrica Grimesanc     Miscarriages / Stillbirths Mother Patrica Cadena     Diabetes Father Devon Cadena     Heart attack Father Devon Cadena     Kidney failure Father Devon Cadena     Stroke Father Devon Cadena     Arthritis Father Devon Cadena     Hypertension Father Devon Laneblanc     Kidney disease Father Devon Cadena     Vision loss Father Devon Cadena     Cancer Brother Jalil Laneblanc     COPD Sister Tanya Cadena     Diabetes Sister Tanya Cadena     COPD Sister Staci Cadena Dominique     Diabetes Sister Staci Cadena Dominique     Diabetes Sister Kae Cadena Laws     Diabetes Sister James Miguel         Medications and Allergies     Medications  Current Outpatient Medications   Medication Instructions     "atorvastatin (LIPITOR) 40 mg, Oral, Daily    baclofen (LIORESAL) 10 mg, Oral, 2 times daily    BD CAYLA 2ND GEN PEN NEEDLE 32 gauge x 5/32" Ndle SMARTSIG:Injection Every Night    blood sugar diagnostic Strp To check BG 2 times daily, to use with insurance preferred meter    blood-glucose meter kit To check BG 2 times daily, to use with insurance preferred meter    DULoxetine (CYMBALTA) 60 mg, Oral, Nightly    empagliflozin (JARDIANCE) 10 mg, Oral, Daily    enalapril (VASOTEC) 2.5 mg, Oral, Daily    ergocalciferol (ERGOCALCIFEROL) 50,000 Units, Oral, Every 7 days    hydrOXYzine pamoate (VISTARIL) 25 MG Cap TAKE 1 CAPSULE BY MOUTH THREE TIMES DAILY AS NEEDED FOR ANXIETY    lancets (ACCU-CHEK SOFTCLIX LANCETS) Misc USE 1  TO CHECK GLUCOSE TWICE DAILY    LANTUS SOLOSTAR U-100 INSULIN 20 Units, Subcutaneous, Nightly    LINZESS 290 mcg, Oral, Daily    metFORMIN (GLUCOPHAGE) 1,000 mg, Oral, 2 times daily with meals    metoprolol tartrate (LOPRESSOR) 25 mg, Oral, 2 times daily    MOUNJARO 2.5 mg, Subcutaneous, Every 7 days    nitrofurantoin, macrocrystal-monohydrate, (MACROBID) 100 MG capsule 100 mg, Oral, 2 times daily    omeprazole (PRILOSEC) 40 mg, Oral, Daily    ondansetron (ZOFRAN-ODT) 4 mg, Oral, Every 8 hours PRN    sodium zirconium cyclosilicate (LOKELMA) 10 g, Oral, Daily, MIX 1 PACKET ONCE DAILY AS DIRECTED AND  TAKE  BY  MOUTH         Allergies  Review of patient's allergies indicates:   Allergen Reactions    Aspirin        Physical Examination     /69 (BP Location: Right arm, Patient Position: Sitting, BP Method: Large (Automatic))   Pulse 79   Temp 97.8 °F (36.6 °C) (Oral)   Resp 17   Ht 5' 1" (1.549 m)   Wt 98.4 kg (217 lb)   SpO2 100%   BMI 41.00 kg/m²     Physical Exam  Constitutional:       Appearance: She is morbidly obese.         General: Alert and oriented, No acute distress.  Head: Normocephalic, Atraumatic.  Eye: Pupils are equal, round and reactive to light, Extraocular movements are " intact, Sclera non-icteric.  Ears/Nose/Throat: Normal, Mucosa moist,Clear.  Neck/Thyroid: Supple, Non-tender, No carotid bruit, No lymphadenopathy, No JVD, Full range of motion.  Respiratory: Clear to auscultation bilaterally; No wheezes, rales or rhonchi,Non-labored respirations, Symmetrical chest wall expansion.  Cardiovascular: Regular rate and rhythm, S1/S2 normal, No murmurs, rubs or gallops.  Gastrointestinal: Soft, Non-tender, Non-distended, Normal bowel sounds, No palpable organomegaly.  Integumentary: Warm, Dry, Intact, No suspicious lesions or rashes.  Extremities: No clubbing, cyanosis or edema  Neurologic: No focal deficits, Cranial Nerves II-XII are grossly intact, Motor strength normal upper and lower extremities, Sensory exam intact.  Psychiatric: Normal interaction, Coherent speech, Appropriate affect       Results     Lab Results   Component Value Date    WBC 7.08 03/13/2024    HGB 12.2 03/13/2024    HCT 38.2 03/13/2024     03/13/2024    CHOL 119 05/13/2024    TRIG 103 05/13/2024    ALT 19 05/13/2024    AST 29 05/13/2024     05/13/2024    K 4.2 05/13/2024    CREATININE 1.23 (H) 05/13/2024    BUN 15.0 05/13/2024    CO2 23 05/13/2024    TSH 1.332 02/07/2024    HGBA1C 6.6 05/13/2024         Assessment and Plan (including Health Maintenance)     Plan:     1. Type 2 diabetes mellitus with stage 3a chronic kidney disease, with long-term current use of insulin  A1C 6.6 at goal. Previous A1C 7.4.   Rx mounjaro 2.5 mg weekly; take on same day of week.  Continue jardiance 10 mg and metformin.  Decrease lantus to 20 mg at bedtime.   Pt will continue to monitor CBGs and notify clinic if symptomatic lows continue.   Follow ADA diet.  Avoid soda, simple sweets, and limit rice/pasta/bread/starches and consume brown options when possible.   Maintain healthy weight with BMI goal <30.   Perform aerobic exercise for 150 minutes per week (or 5 days a week for 30 minutes each day).   Examine feet daily.    Obtain annual dilated eye exam.  Eye exam: 8/8/23  Foot exam:8/8/23  - insulin glargine U-100, Lantus, (LANTUS SOLOSTAR U-100 INSULIN) 100 unit/mL (3 mL) InPn pen; Inject 20 Units into the skin every evening.  Dispense: 20 mL; Refill: 1    2. Class 3 severe obesity due to excess calories with serious comorbidity and body mass index (BMI) of 40.0 to 44.9 in adult  BMI 41. Goal BMI <30.  Aerobic exercise 150 minutes per week.  Avoid soda, simple sugars, sweets, excessive rice, pasta, potatoes or bread.   Choose brown options when available and portion control.  Limit fast foods and fried foods.   Choose complex carbs in moderation (ex: green, leafy vegetables, beans, oatmeal).  Eat plenty of fresh fruits and vegetables with lean meats daily.   Consider permanent healthy lifestyle changes.    3. Acute cystitis  Rx macrobid.  Start antibiotics as prescribed.   Complete the full course of the medication.  Report any continuing signs such as nausea/vomiting, visible blood in urine, increased low back or flank pain, worsening burning upon urination after antibiotic completion or fever.  Drink plenty of water.  Avoid soda or carbonated beverages.   Urinate frequently; do not hold urine for extended periods of time.  Wear cotton underwear, avoid tight fitting pants.  Use OTC AZO or pyridium for relief of urinary spasms.  Women: wipe front to back, urinate after sexual intercourse, and avoid scented or irritating feminine products, bubble baths, body washes, bath bombs, and soaps.        Problem List Items Addressed This Visit          Renal/    Acute cystitis without hematuria    Relevant Medications    nitrofurantoin, macrocrystal-monohydrate, (MACROBID) 100 MG capsule       Endocrine    Type 2 diabetes mellitus with stage 3a chronic kidney disease, with long-term current use of insulin - Primary (Chronic)    Relevant Medications    insulin glargine U-100, Lantus, (LANTUS SOLOSTAR U-100 INSULIN) 100 unit/mL (3 mL) InPn  pen    tirzepatide (MOUNJARO) 2.5 mg/0.5 mL PnIj    Other Relevant Orders    Basic Metabolic Panel    Hemoglobin A1C    Class 3 severe obesity due to excess calories with serious comorbidity and body mass index (BMI) of 40.0 to 44.9 in adult (Chronic)         Health Maintenance Due   Topic Date Due    COVID-19 Vaccine (4 - 2023-24 season) 09/01/2023    Eye Exam  08/08/2024       Follow up in about 3 months (around 8/16/2024) for Follow up, Diabetes, Lab Results.        Signature:  ALYX Robison  [facility]  [address] [facility] [address]

## 2024-05-22 ENCOUNTER — OFFICE VISIT (OUTPATIENT)
Dept: NEPHROLOGY | Facility: CLINIC | Age: 59
End: 2024-05-22
Payer: MEDICARE

## 2024-05-22 DIAGNOSIS — E66.01 SEVERE OBESITY (BMI >= 40): ICD-10-CM

## 2024-05-22 DIAGNOSIS — N18.31 CKD STAGE G3A/A2, GFR 45-59 AND ALBUMIN CREATININE RATIO 30-299 MG/G: Primary | ICD-10-CM

## 2024-05-22 DIAGNOSIS — I10 PRIMARY HYPERTENSION: ICD-10-CM

## 2024-05-22 DIAGNOSIS — E11.22 TYPE 2 DIABETES MELLITUS WITH STAGE 3A CHRONIC KIDNEY DISEASE, WITH LONG-TERM CURRENT USE OF INSULIN: ICD-10-CM

## 2024-05-22 DIAGNOSIS — Z79.4 TYPE 2 DIABETES MELLITUS WITH STAGE 3A CHRONIC KIDNEY DISEASE, WITH LONG-TERM CURRENT USE OF INSULIN: ICD-10-CM

## 2024-05-22 DIAGNOSIS — Z91.89 AT HIGH RISK FOR HYPERKALEMIA: ICD-10-CM

## 2024-05-22 DIAGNOSIS — N18.31 TYPE 2 DIABETES MELLITUS WITH STAGE 3A CHRONIC KIDNEY DISEASE, WITH LONG-TERM CURRENT USE OF INSULIN: ICD-10-CM

## 2024-05-22 PROCEDURE — 3066F NEPHROPATHY DOC TX: CPT | Mod: CPTII,95,, | Performed by: NURSE PRACTITIONER

## 2024-05-22 PROCEDURE — 3044F HG A1C LEVEL LT 7.0%: CPT | Mod: CPTII,95,, | Performed by: NURSE PRACTITIONER

## 2024-05-22 PROCEDURE — 4010F ACE/ARB THERAPY RXD/TAKEN: CPT | Mod: CPTII,95,, | Performed by: NURSE PRACTITIONER

## 2024-05-22 PROCEDURE — 1160F RVW MEDS BY RX/DR IN RCRD: CPT | Mod: CPTII,95,, | Performed by: NURSE PRACTITIONER

## 2024-05-22 PROCEDURE — 99214 OFFICE O/P EST MOD 30 MIN: CPT | Mod: 95,,, | Performed by: NURSE PRACTITIONER

## 2024-05-22 PROCEDURE — 3060F POS MICROALBUMINURIA REV: CPT | Mod: CPTII,95,, | Performed by: NURSE PRACTITIONER

## 2024-05-22 PROCEDURE — 1159F MED LIST DOCD IN RCRD: CPT | Mod: CPTII,95,, | Performed by: NURSE PRACTITIONER

## 2024-05-22 NOTE — PROGRESS NOTES
This is a telemedicine note.   I have reviewed the patient's name and date of birth.  I have verbally reviewed the past medical history, active medication list, and allergies with the patient.  I have verified that this patient is in the state of [place].  Patient was treated using telemedicine, real-time audio and video, according to [facility] protocols. I, distant provider, conducted the visit from [facility] and [facility] in [place], [place]. The patient participated in the visit at a non-[facility] location. Patient was located at: Home. I am licensed in the state where the patient stated they are located. The patient stated that they understood and accepted the privacy and security risks to their information in their location.    Verbal consent to participate in interactive audio & video visit was obtained. Patient was explained that:  - Our sessions are not being recorded and that personal health information is protected   - I would evaluate the patient and recommend diagnostics and treatments based on my assessment  - The team will provide follow up care in person if/when the patient needs it.    Total time spent with patient was 25 minutes, over 50% of which was used for education and counseling regarding medical conditions, current medications including risk/benefit and side effects/adverse events, over-the-counter medication uses/doses, home self-care and contact precautions, and red flags and indications for immediate medical attention. The patient is receptive, expresses understanding and is agreeable to plan. All questions answered and concerns addressed.  [facility]  Nephrology Clinic Note    Chief complaint: Chronic Kidney Disease (Follow up)    History of present illness:   Gloria Denis is a 58 y.o. Black or  female with past medical history of chronic kidney disease, persistent hyperkalemia, diabetes mellitus type 2 (diagnosed in  her late 30s), hypertension, dyslipidemia, sleep apnea, anxiety, depression, chronic back pain, GERD, chronic constipation and morbid obesity. Patient presents for follow-up appointment in nephrology clinic. Denies complaints.     Review of Systems  12 point review of systems conducted, negative except as stated in the history of present illness.    Allergies: Patient is allergic to aspirin.     Past Medical History:  has a past medical history of Anxiety disorder, unspecified, Chronic constipation, CKD (chronic kidney disease), Depression, DM (diabetes mellitus), Dyslipidemia, GERD (gastroesophageal reflux disease), HTN (hypertension), Irritable bowel syndrome, Morbid obesity, and Sleep apnea, unspecified.    Procedure History:  has a past surgical history that includes  section; Cholecystectomy; Arthroscopic removal of loose body from joint; Drainage of oral abscess; Exploration using laparoscope; Upper gastrointestinal endoscopy; Abscess drainage; and Fracture surgery ().    Family History: family history includes Arthritis in her father and mother; COPD in her sister and sister; Cancer in her brother and mother; Diabetes in her father, sister, sister, sister, and sister; Heart attack in her father; Heart disease in her mother; Hypertension in her father and mother; Kidney disease in her father; Kidney failure in her father; Miscarriages / Stillbirths in her mother; Rheum arthritis in her mother; Stroke in her father; Vision loss in her father.    Social History:  reports that she has never smoked. She has never been exposed to tobacco smoke. She has never used smokeless tobacco. She reports that she does not drink alcohol and does not use drugs.    Physical exam  General appearance: Patient is in no acute distress.    Home Medications:    Current Outpatient Medications:     atorvastatin (LIPITOR) 40 MG tablet, Take 1 tablet (40 mg total) by mouth once daily., Disp: 90 tablet, Rfl: 3    baclofen  "(LIORESAL) 10 MG tablet, Take 1 tablet (10 mg total) by mouth 2 (two) times daily. (Patient taking differently: Take 10 mg by mouth 2 (two) times daily as needed.), Disp: 60 tablet, Rfl: 3    BD CAYLA 2ND GEN PEN NEEDLE 32 gauge x 5/32" Ndle, SMARTSIG:Injection Every Night, Disp: 100 each, Rfl: 3    blood sugar diagnostic Strp, To check BG 2 times daily, to use with insurance preferred meter, Disp: 100 each, Rfl: 3    blood-glucose meter kit, To check BG 2 times daily, to use with insurance preferred meter, Disp: 1 each, Rfl: 0    DULoxetine (CYMBALTA) 60 MG capsule, Take 1 capsule (60 mg total) by mouth every evening., Disp: 90 capsule, Rfl: 3    empagliflozin (JARDIANCE) 10 mg tablet, Take 1 tablet (10 mg total) by mouth once daily., Disp: 30 tablet, Rfl: 6    enalapril (VASOTEC) 2.5 MG tablet, Take 1 tablet (2.5 mg total) by mouth once daily., Disp: 90 tablet, Rfl: 3    ergocalciferol (ERGOCALCIFEROL) 50,000 unit Cap, Take 1 capsule (50,000 Units total) by mouth every 7 days., Disp: 12 capsule, Rfl: 3    hydrOXYzine pamoate (VISTARIL) 25 MG Cap, TAKE 1 CAPSULE BY MOUTH THREE TIMES DAILY AS NEEDED FOR ANXIETY, Disp: 90 capsule, Rfl: 1    insulin glargine U-100, Lantus, (LANTUS SOLOSTAR U-100 INSULIN) 100 unit/mL (3 mL) InPn pen, Inject 20 Units into the skin every evening., Disp: 20 mL, Rfl: 1    lancets (ACCU-CHEK SOFTCLIX LANCETS) Misc, USE 1  TO CHECK GLUCOSE TWICE DAILY, Disp: 100 each, Rfl: 6    LINZESS 290 mcg Cap capsule, Take 1 capsule (290 mcg total) by mouth once daily., Disp: 90 capsule, Rfl: 3    metFORMIN (GLUCOPHAGE) 1000 MG tablet, Take 1 tablet (1,000 mg total) by mouth 2 (two) times daily with meals., Disp: 180 tablet, Rfl: 1    metoprolol tartrate (LOPRESSOR) 25 MG tablet, Take 1 tablet (25 mg total) by mouth 2 (two) times daily., Disp: 180 tablet, Rfl: 3    nitrofurantoin, macrocrystal-monohydrate, (MACROBID) 100 MG capsule, Take 1 capsule (100 mg total) by mouth 2 (two) times daily. for 7 days, " Disp: 14 capsule, Rfl: 0    omeprazole (PRILOSEC) 40 MG capsule, Take 1 capsule (40 mg total) by mouth once daily., Disp: 30 capsule, Rfl: 5    ondansetron (ZOFRAN-ODT) 4 MG TbDL, Take 1 tablet (4 mg total) by mouth every 8 (eight) hours as needed (nausea and vomiting)., Disp: 30 tablet, Rfl: 1    sodium zirconium cyclosilicate (LOKELMA) 10 gram packet, Take 1 packet (10 g total) by mouth once daily. MIX 1 PACKET ONCE DAILY AS DIRECTED AND  TAKE  BY  MOUTH (Patient taking differently: Take 10 g by mouth 3 (three) times a week. MIX 1 PACKET ONCE DAILY AS DIRECTED AND  TAKE  BY  MOUTH), Disp: 90 packet, Rfl: 3    tirzepatide (MOUNJARO) 2.5 mg/0.5 mL PnIj, Inject 2.5 mg into the skin every 7 days., Disp: 4 Pen, Rfl: 3    Laboratory data    Lab Results   Component Value Date    WBC 7.08 03/13/2024    HGB 12.2 03/13/2024    HCT 38.2 03/13/2024     03/13/2024     05/13/2024    K 4.2 05/13/2024    CHLORIDE 111 (H) 05/13/2024    CO2 23 05/13/2024    BUN 15.0 05/13/2024    CREATININE 1.23 (H) 05/13/2024    EGFRNORACEVR 51 05/13/2024    GLUCOSE 90 05/13/2024    CALCIUM 9.3 05/13/2024    ALKPHOS 75 05/13/2024    LABPROT 7.0 05/13/2024    ALBUMIN 3.5 05/13/2024    BILIDIR 0.2 01/24/2022    IBILI 0.30 01/24/2022    AST 29 05/13/2024    ALT 19 05/13/2024    PHOS 3.7 05/13/2024      Lab Results   Component Value Date    HGBA1C 6.6 05/13/2024    PTH 59.2 05/13/2024    [id number] 23.1 (L) 03/12/2024    HIV Nonreactive 05/17/2017    HEPCAB Reactive (A) 05/17/2017    HEPBSURFAG Negative 05/17/2017     Urine:  Lab Results   Component Value Date    COLORUA Yellow 05/13/2024    APPEARANCEUA Slightly Cloudy (A) 05/13/2024    SGUA 1.020 05/13/2024    PHUA 5.0 05/13/2024    PROTEINUA 1+ (A) 05/13/2024    GLUCOSEUA Negative 05/13/2024    KETONESUA 1+ (A) 05/13/2024    BLOODUA Trace-Intact (A) 05/13/2024    NITRITESUA Positive (A) 05/13/2024    LEUKOCYTESUR 1+ (A) 05/13/2024    RBCUA 0-2 05/13/2024    WBCUA 21-50 (A) 05/13/2024     BACTERIA Many (A) 05/13/2024    SQEPUA Few (A) 11/03/2023    HYALINECASTS None Seen 11/03/2023    CREATRANDUR 267.2 (H) 05/13/2024    CREATRANDUR 266.4 (H) 05/13/2024    PROTEINURINE 35.7 05/13/2024    UPROTCREA 0.1 05/13/2024         Imaging  US Retroperitoneal Limited 05/17/2021  FINDINGS: The bilateral kidneys demonstrate parenchymal thinning.  The right kidney measures 10.5 x 4.1 x 4.3 cm and is otherwise  unremarkable. There is no right hydronephrosis.  The left kidney measures 10.5 x 4.2 x 5.2 cm and is otherwise  unremarkable. There is no left hydronephrosis.  IMPRESSION:  1. Bilateral medical renal disease.  Electronically Signed By: Shai Ga MD  Date/Time Signed: 05/17/2021 14:05    Off bicardb try to stop lokelma once runs out, takes 2-3 times/week  Impression    ICD-10-CM ICD-9-CM   1. CKD stage G3a/A2, GFR 45-59 and albumin creatinine ratio  mg/g  N18.31 585.3   2. Primary hypertension  I10 401.9   3. At high risk for hyperkalemia  Z91.89 [id number]   4. Type 2 diabetes mellitus with stage 3a chronic kidney disease, with long-term current use of insulin  E11.22 250.40    N18.31 585.3    Z79.4 V58.67   5. Severe obesity (BMI >= 40)  E66.01 278.01        Plan  CKD stage G3a/A2, GFR 45-59 and albumin creatinine ratio  mg/g  -     Comprehensive Metabolic Panel; Future; Expected date: 08/15/2024  -     Protein/Creatinine Ratio, Urine; Future; Expected date: 08/15/2024  -     Urinalysis, Reflex to Urine Culture; Future; Expected date: 08/15/2024  Possibly diabetic kidney disease.  Serum creatinine is stable, there is no significant proteinuria, imaging of the kidneys was essentially unremarkable. Continue risk factor management. Continue enalapril with close monitoring of serum potassium.  Continue SGLT2 inhibitor therapy.  Continue renal sparing activities:    -Follow low sodium diet (2 grams a day)  -Control diabetes (goal A1C <7.0%)  -Control high blood pressure ( goal BP < 130/80,  please record BP at home every day and bring log to next office visit)  -Exercise at least 30 minutes a day, 5 days a week.  -Maintain healthy weight.  -Decrease or stop alcohol use  -Do not smoke  -Stay well hydrated  -Receive Pneumovax, Flu, and HBV vaccines if indicated.  -Do not take NSAIDs (Ibuprofen, Naproxen, Aleve, Advil, Toradol, Mobic), may take only Tylenol as needed for pain/headaches.  -Take cholesterol-lowering medications as prescribed (LDL goal <100)    Primary hypertension  Blood pressure reading is at goal, continue current antihypertensive regimen and 2 g a day dietary sodium restriction.      At high risk for hyperkalemia  Patient is taking Lokelma 2-3 times a week.  Will continue current regimen.  She states that she might not be able to refill the prescription due to the fact that insurance is not covering this medication.  Will repeat labs in 3 months, if hyperkalemia recurs, will consider alternative potassium binder.      Type 2 diabetes mellitus with stage 3a chronic kidney disease, with long-term current use of insulin  Continue current medication regimen.      Severe obesity (BMI >= 40)  Lifestyle and dietary interventions discussed, patient encouraged to maintain non-sedentary lifestyle and well-balanced diet.         Follow up in about 3 months (around 8/22/2024).         5/22/2024  Charlene Pineda NP  [facility]

## 2024-05-27 ENCOUNTER — PATIENT MESSAGE (OUTPATIENT)
Dept: INTERNAL MEDICINE | Facility: CLINIC | Age: 59
End: 2024-05-27
Payer: MEDICARE

## 2024-05-30 ENCOUNTER — PATIENT MESSAGE (OUTPATIENT)
Dept: INTERNAL MEDICINE | Facility: CLINIC | Age: 59
End: 2024-05-30
Payer: MEDICARE

## 2024-05-30 DIAGNOSIS — Z79.4 TYPE 2 DIABETES MELLITUS WITH STAGE 3A CHRONIC KIDNEY DISEASE, WITH LONG-TERM CURRENT USE OF INSULIN: Chronic | ICD-10-CM

## 2024-05-30 DIAGNOSIS — N18.31 TYPE 2 DIABETES MELLITUS WITH STAGE 3A CHRONIC KIDNEY DISEASE, WITH LONG-TERM CURRENT USE OF INSULIN: Chronic | ICD-10-CM

## 2024-05-30 DIAGNOSIS — E11.22 TYPE 2 DIABETES MELLITUS WITH STAGE 3A CHRONIC KIDNEY DISEASE, WITH LONG-TERM CURRENT USE OF INSULIN: Chronic | ICD-10-CM

## 2024-05-31 RX ORDER — INSULIN GLARGINE 100 [IU]/ML
20 INJECTION, SOLUTION SUBCUTANEOUS NIGHTLY
Qty: 6 ML | Refills: 2 | Status: SHIPPED | OUTPATIENT
Start: 2024-05-31

## 2024-06-03 DIAGNOSIS — N18.31 TYPE 2 DIABETES MELLITUS WITH STAGE 3A CHRONIC KIDNEY DISEASE, WITH LONG-TERM CURRENT USE OF INSULIN: Chronic | ICD-10-CM

## 2024-06-03 DIAGNOSIS — E11.22 TYPE 2 DIABETES MELLITUS WITH STAGE 3A CHRONIC KIDNEY DISEASE, WITH LONG-TERM CURRENT USE OF INSULIN: Chronic | ICD-10-CM

## 2024-06-03 DIAGNOSIS — Z79.4 TYPE 2 DIABETES MELLITUS WITH STAGE 3A CHRONIC KIDNEY DISEASE, WITH LONG-TERM CURRENT USE OF INSULIN: Chronic | ICD-10-CM

## 2024-06-03 RX ORDER — TIRZEPATIDE 2.5 MG/.5ML
2.5 INJECTION, SOLUTION SUBCUTANEOUS
Qty: 4 PEN | Refills: 1 | Status: SHIPPED | OUTPATIENT
Start: 2024-06-03

## 2024-06-12 ENCOUNTER — PATIENT MESSAGE (OUTPATIENT)
Dept: CARDIOLOGY | Facility: CLINIC | Age: 59
End: 2024-06-12
Payer: MEDICARE

## 2024-07-05 ENCOUNTER — PATIENT MESSAGE (OUTPATIENT)
Dept: INTERNAL MEDICINE | Facility: CLINIC | Age: 59
End: 2024-07-05
Payer: MEDICARE

## 2024-07-05 NOTE — PROGRESS NOTES
CHIEF COMPLAINT:   No chief complaint on file.                  Review of patient's allergies indicates:   Allergen Reactions    Aspirin                                           HPI:  Gloria Denis 58 y.o. female with a past medical history of hypertension, hyperlipidemia, sleep apnea, diabetes mellitus, depression, anxiety, and chronic back pain who presents for follow up and ongoing care.  Patient completed an echocardiogram on June 30, 2022 which revealed an ejection fraction of 60% with grade 1 diastolic dysfunction.  See report below.  She completed AMBER testing on July 15, 2022 which revealed no significant arterial flow reduction in the bilateral lower extremities.  See report below.   She completed a Lexiscan stress test in July 2020 which revealed no ischemia. She also completed AMBER testing in June 2020 which revealed no evidence of significant arterial insufficiency. She continues to ambulate with a cane due to chronic back pain.  At last office visit patient reported stable CROUCH and occasional ongoing dizziness and lightheadedness that had actually improved from prior visit.    Today the patient states that she is feeling well overall and mostly unchanged from last visit.  She states that her CROUCH has been stable and has not progressed.  She still has occasional dyspnea with physical activity and occasionally with ADLs.  She states that she is not very active in her day-to-day today life and she is likely deconditioned.  She reports occasional palpitations where she feels as though her heart is racing, but states that it was not very bothersome in his only occurred about 3 times since her last visit.  Otherwise she denies any chest pain, lightheadedness, dizziness, syncope, PND, orthopnea, claudication symptoms, or peripheral edema.  She states that she was able to complete her ADLs without any issues or ischemic symptoms, however she does have to take frequent breaks due to fatigue.  She states  that she is only minimally physically active and her main limiting factors or chronic back pain in general fatigue.  She states that she is hoping to be able to exercise more in the future.  She reports compliance with all her current medications.  She denies any tobacco or other illicit drug use.  She states that she has been intermittently using her BiPAP.  Educated her on the importance of compliance.       CARDIAC TESTING  AMBER Testing 7.15.22:                                                                                                       The right lower extremity demonstrated multiphasic waveforms at all levels.   The AMBER on the right was normal at 1.15.  The TBI on the right was normal at 0.77.  The right lower extremity demonstrated no significant arterial flow reduction.     The left lower extremity demonstrated multiphasic waveforms at all levels.   The AMBER on the left was invalid due to non-compressible vessels at the ankle.  The TBI on the left was normal at 0.80.  The left lower extremity demonstrated no significant arterial flow reduction.                                                                                                                                                                                                                                                                                                    CARDIAC TESTING:  Results for orders placed during the hospital encounter of 06/30/22    Echo    Interpretation Summary  · The left ventricle is normal in size with concentric remodeling and normal systolic function.  · The estimated ejection fraction is 60%.  · Grade I left ventricular diastolic dysfunction.  · Normal right ventricular size with normal right ventricular systolic function.  · Normal central venous pressure (3 mmHg).  · The estimated PA systolic pressure is 23 mmHg.  · IVC is normal.  · There is no pulmonary hypertension.    Lexiscan stress test July 2020:  Scan  is consistent with: No ischemia  No scar  Attenuation artifact in the inferolateral wall  Ejection fraction: 90%.    AMBER testing 2020:  No evidence of significant arterial insufficiency was identified in the study.    Echocardiogram 2019:  Left ventricular ejection fraction is measured at approximately 55-60%.  Normal right ventricular size with preserved RV function.  No significant valvular abnormalities.  There is trace tricuspid regurgitation with estimated RVSP of 24 mmHg.  No evidence of pericardial effusion.          Patient Active Problem List   Diagnosis    Mixed anxiety depressive disorder    Chronic back pain    Diabetic neuropathy    Hyperlipidemia LDL goal <70    Primary hypertension    Sleep apnea    Sciatica    Chronic constipation    Gastroesophageal reflux disease    Screening for colon cancer    CKD stage G3b/A2, GFR 30-44 and albumin creatinine ratio  mg/g    Chronic bilateral low back pain with bilateral sciatica    BMI 40.0-44.9, adult    CROUCH (dyspnea on exertion)    Cellulitis    Type 2 diabetes mellitus with stage 3a chronic kidney disease, with long-term current use of insulin    JORGE ALBERTO (obstructive sleep apnea)    Polyneuropathy associated with underlying disease    NPDR (nonproliferative diabetic retinopathy)    DDD (degenerative disc disease), lumbar    Central stenosis of spinal canal    Class 3 severe obesity due to excess calories with serious comorbidity and body mass index (BMI) of 40.0 to 44.9 in adult    Chronic left shoulder pain    Paronychia of second toe    Rash    Acute cystitis without hematuria     Past Surgical History:   Procedure Laterality Date    ABSCESS DRAINAGE      I don't remember the date    ARTHROSCOPIC REMOVAL OF LOOSE BODY FROM JOINT       SECTION      CHOLECYSTECTOMY      Drainage of oral abscess      Exploration using laparoscope      FRACTURE SURGERY  1985    UPPER GASTROINTESTINAL ENDOSCOPY       Social  History     Socioeconomic History    Marital status:      Spouse name: Kp   Tobacco Use    Smoking status: Never     Passive exposure: Never    Smokeless tobacco: Never   Substance and Sexual Activity    Alcohol use: Never    Drug use: Never    Sexual activity: Not Currently     Partners: Male     Birth control/protection: None     Social Determinants of Health     Financial Resource Strain: Low Risk  (2/15/2024)    Overall Financial Resource Strain (CARDIA)     Difficulty of Paying Living Expenses: Not hard at all   Recent Concern: Financial Resource Strain - Medium Risk (12/1/2023)    Overall Financial Resource Strain (CARDIA)     Difficulty of Paying Living Expenses: Somewhat hard   Food Insecurity: No Food Insecurity (2/15/2024)    Hunger Vital Sign     Worried About Running Out of Food in the Last Year: Never true     Ran Out of Food in the Last Year: Never true   Transportation Needs: No Transportation Needs (2/15/2024)    PRAPARE - Transportation     Lack of Transportation (Medical): No     Lack of Transportation (Non-Medical): No   Physical Activity: Inactive (2/15/2024)    Exercise Vital Sign     Days of Exercise per Week: 0 days     Minutes of Exercise per Session: 0 min   Stress: No Stress Concern Present (2/15/2024)    [employer] - Occupational Stress Questionnaire     Feeling of Stress : Not at all   Housing Stability: Low Risk  (2/15/2024)    Housing Stability Vital Sign     Unable to Pay for Housing in the Last Year: No     Number of Places Lived in the Last Year: 1     Unstable Housing in the Last Year: No        Family History   Problem Relation Name Age of Onset    Cancer Mother Patrica TOSCANOCecil Cadena     Heart disease Mother Patrica TOSCANOCecil Cadena     Rheum arthritis Mother Patrica DORCASCecil Cadena     Arthritis Mother Patrica TOSCANOCecil Cadena     Hypertension Mother Patrica TOSCANOCecil Cadena     Miscarriages / Stillbirths Mother Patrica CODY Cadena     Diabetes  "Father Devon Cadena     Heart attack Father Devon Cadena     Kidney failure Father Devon Cadena     Stroke Father Devon Cadena     Arthritis Father Devon Cadena     Hypertension Father Devon Cadena     Kidney disease Father Devon Cadena     Vision loss Father Devon Cadena     Cancer Brother Jalil Cadena     COPD Sister Tanya Cadena     Diabetes Sister Tanya Cadena     COPD Sister Staci Cadena Dominique     Diabetes Sister Staci Cadena Dominique     Diabetes Sister Kae Laneblanc Laws     Diabetes Sister James Miguel          Current Outpatient Medications:     atorvastatin (LIPITOR) 40 MG tablet, Take 1 tablet (40 mg total) by mouth once daily., Disp: 90 tablet, Rfl: 3    baclofen (LIORESAL) 10 MG tablet, Take 1 tablet (10 mg total) by mouth 2 (two) times daily. (Patient taking differently: Take 10 mg by mouth 2 (two) times daily as needed.), Disp: 60 tablet, Rfl: 3    BD CAYLA 2ND GEN PEN NEEDLE 32 gauge x 5/32" Ndle, SMARTSIG:Injection Every Night, Disp: 100 each, Rfl: 3    blood sugar diagnostic Strp, To check BG 2 times daily, to use with insurance preferred meter, Disp: 100 each, Rfl: 3    blood-glucose meter kit, To check BG 2 times daily, to use with insurance preferred meter, Disp: 1 each, Rfl: 0    DULoxetine (CYMBALTA) 60 MG capsule, Take 1 capsule (60 mg total) by mouth every evening., Disp: 90 capsule, Rfl: 3    empagliflozin (JARDIANCE) 10 mg tablet, Take 1 tablet (10 mg total) by mouth once daily., Disp: 30 tablet, Rfl: 6    enalapril (VASOTEC) 2.5 MG tablet, Take 1 tablet (2.5 mg total) by mouth once daily., Disp: 90 tablet, Rfl: 3    ergocalciferol (ERGOCALCIFEROL) 50,000 unit Cap, Take 1 capsule (50,000 Units total) by mouth every 7 days., Disp: 12 capsule, Rfl: 3    hydrOXYzine pamoate (VISTARIL) 25 MG Cap, TAKE 1 CAPSULE BY MOUTH THREE TIMES DAILY AS NEEDED FOR ANXIETY, Disp: 90 capsule, Rfl: 1    insulin glargine U-100, " Lantus, (LANTUS SOLOSTAR U-100 INSULIN) 100 unit/mL (3 mL) InPn pen, Inject 20 Units into the skin every evening., Disp: 6 mL, Rfl: 2    lancets (ACCU-CHEK SOFTCLIX LANCETS) Misc, USE 1  TO CHECK GLUCOSE TWICE DAILY, Disp: 100 each, Rfl: 6    LINZESS 290 mcg Cap capsule, Take 1 capsule (290 mcg total) by mouth once daily., Disp: 90 capsule, Rfl: 3    metFORMIN (GLUCOPHAGE) 1000 MG tablet, Take 1 tablet (1,000 mg total) by mouth 2 (two) times daily with meals., Disp: 180 tablet, Rfl: 1    metoprolol tartrate (LOPRESSOR) 25 MG tablet, Take 1 tablet (25 mg total) by mouth 2 (two) times daily., Disp: 180 tablet, Rfl: 3    omeprazole (PRILOSEC) 40 MG capsule, Take 1 capsule (40 mg total) by mouth once daily., Disp: 30 capsule, Rfl: 5    ondansetron (ZOFRAN-ODT) 4 MG TbDL, Take 1 tablet (4 mg total) by mouth every 8 (eight) hours as needed (nausea and vomiting)., Disp: 30 tablet, Rfl: 1    sodium zirconium cyclosilicate (LOKELMA) 10 gram packet, Take 1 packet (10 g total) by mouth once daily. MIX 1 PACKET ONCE DAILY AS DIRECTED AND  TAKE  BY  MOUTH (Patient taking differently: Take 10 g by mouth 3 (three) times a week. MIX 1 PACKET ONCE DAILY AS DIRECTED AND  TAKE  BY  MOUTH), Disp: 90 packet, Rfl: 3    tirzepatide (MOUNJARO) 2.5 mg/0.5 mL PnIj, Inject 2.5 mg into the skin every 7 days., Disp: 4 Pen, Rfl: 1     ROS:                                                                                                                                                                             Review of Systems   Constitutional: Negative.    Respiratory:  Positive for shortness of breath.    Cardiovascular:  Negative for chest pain, palpitations (Rare), orthopnea, claudication, leg swelling and PND.   Gastrointestinal: Negative.    Musculoskeletal:  Positive for back pain.   Skin: Negative.    Neurological:  Positive for dizziness.   Psychiatric/Behavioral: Negative.          There were no vitals taken for this visit.    PE:  Physical Exam  Constitutional:       Appearance: Normal appearance.   HENT:      Head: Normocephalic and atraumatic.      Mouth/Throat:      Mouth: Mucous membranes are moist.   Eyes:      Extraocular Movements: Extraocular movements intact.   Cardiovascular:      Rate and Rhythm: Normal rate and regular rhythm.      Pulses: Normal pulses.   Pulmonary:      Effort: Pulmonary effort is normal.      Breath sounds: Normal breath sounds.   Abdominal:      General: There is no distension.      Palpations: Abdomen is soft.   Musculoskeletal:         General: Normal range of motion.      Cervical back: Normal range of motion. No tenderness.      Right lower leg: No edema.      Left lower leg: No edema.   Skin:     General: Skin is warm and dry.   Neurological:      General: No focal deficit present.      Mental Status: She is alert and oriented to person, place, and time.   Psychiatric:         Mood and Affect: Mood normal.         Behavior: Behavior normal.      ASSESSMENT/PLAN:  Lightheadedness/Dizziness  - Reports only 1-2 episodes of dizziness and lightheadedness since her last visit.  States that it has greatly improved.  - Not limiting or interfering in ADLs  - Denies any syncopal events    Chest Pain - resolved  - Denies any recent chest pain or ischemic symptoms  - Lexiscan Stress test July 2020 - no ischemia    CROUCH   - Reports CROUCH with moderate intensity activities, occasionally with ADLs.  She states that it has been stable since last office visit.  Advised patient that if CROUCH persists we will likely repeat echocardiogram and stress test given no recent testing  - EF 60% per Echo June 2022  - EF 55-60% per Echo June 2019    Hypertension  - BP at goal today- 109/76  - Continue Enalapril and Metoprolol tartrate  - Counseled on low salt diet and increased activity as tolerated     Hyperlipidemia   -  per labs February 2024  - Patient states that she has not been diligent in following a low-cholesterol  diet.  She states that she has been eating whatever her son cooks for her.  She would like to try with diet and exercise for a few more months before increasing atorvastatin dose.  - Will have her complete FLP/CMP prior next visit  - Continue Atorvastatin 40mg daily for now  - Strongly encouraged a low cholesterol, low fat diet and increased activity as tolerated     Sleep apnea on BiPAP   - Recommend nightly BiPAP use   - Reports intermittent use currently    Diabetes mellitus  - Management per PCP    Depression/Anxiety  - Management per PCP    Chronic back pain  - Continue management per PCP    Bilateral lower extremity pain  - AMBER testing July 2022:  no significant arterial flow reduction in the bilateral lower extremities  - States that her pain is mostly due to her chronic sciatic nerve pain      Complete FLP/CMP in approximately 3 months, or prior to next visit   Follow up in cardiology clinic in 4 months or sooner if needed  Follow up with PCP as directed

## 2024-07-05 NOTE — PROGRESS NOTES
I have reviewed and concur with the resident's history, physical, assessment, and plan.  I have discussed with him all issues related to the diagnosis, workup and treatment plan. Care provided as reasonable and necessary.    Yariel Hartmann MD  [facility]     Reviewed chart, met with pt and family at bedside. Plan for pt to DC home with wife and [facility]. Spoke with Leidy at [facility] and new HC orders placed in Epic.

## 2024-07-15 DIAGNOSIS — Z79.4 TYPE 2 DIABETES MELLITUS WITH STAGE 3A CHRONIC KIDNEY DISEASE, WITH LONG-TERM CURRENT USE OF INSULIN: Primary | Chronic | ICD-10-CM

## 2024-07-15 DIAGNOSIS — E11.22 TYPE 2 DIABETES MELLITUS WITH STAGE 3A CHRONIC KIDNEY DISEASE, WITH LONG-TERM CURRENT USE OF INSULIN: Primary | Chronic | ICD-10-CM

## 2024-07-15 DIAGNOSIS — N18.31 TYPE 2 DIABETES MELLITUS WITH STAGE 3A CHRONIC KIDNEY DISEASE, WITH LONG-TERM CURRENT USE OF INSULIN: Primary | Chronic | ICD-10-CM

## 2024-07-15 RX ORDER — TIRZEPATIDE 5 MG/.5ML
5 INJECTION, SOLUTION SUBCUTANEOUS
Qty: 4 PEN | Refills: 1 | Status: SHIPPED | OUTPATIENT
Start: 2024-07-15 | End: 2025-07-15

## 2024-07-19 ENCOUNTER — OFFICE VISIT (OUTPATIENT)
Dept: CARDIOLOGY | Facility: CLINIC | Age: 59
End: 2024-07-19
Payer: MEDICARE

## 2024-07-19 DIAGNOSIS — E78.5 HYPERLIPIDEMIA LDL GOAL <70: Chronic | ICD-10-CM

## 2024-07-19 DIAGNOSIS — T73.3XXA FATIGUE DUE TO EXCESSIVE EXERTION, INITIAL ENCOUNTER: ICD-10-CM

## 2024-07-19 DIAGNOSIS — G47.33 OSA (OBSTRUCTIVE SLEEP APNEA): Chronic | ICD-10-CM

## 2024-07-19 DIAGNOSIS — R06.09 DOE (DYSPNEA ON EXERTION): Primary | ICD-10-CM

## 2024-07-19 DIAGNOSIS — I10 PRIMARY HYPERTENSION: Chronic | ICD-10-CM

## 2024-07-19 PROCEDURE — 1160F RVW MEDS BY RX/DR IN RCRD: CPT | Mod: CPTII,95,,

## 2024-07-19 PROCEDURE — 3060F POS MICROALBUMINURIA REV: CPT | Mod: CPTII,95,,

## 2024-07-19 PROCEDURE — 3044F HG A1C LEVEL LT 7.0%: CPT | Mod: CPTII,95,,

## 2024-07-19 PROCEDURE — 99214 OFFICE O/P EST MOD 30 MIN: CPT | Mod: 95,,,

## 2024-07-19 PROCEDURE — 4010F ACE/ARB THERAPY RXD/TAKEN: CPT | Mod: CPTII,95,,

## 2024-07-19 PROCEDURE — 3066F NEPHROPATHY DOC TX: CPT | Mod: CPTII,95,,

## 2024-07-19 PROCEDURE — 1159F MED LIST DOCD IN RCRD: CPT | Mod: CPTII,95,,

## 2024-07-19 NOTE — PATIENT INSTRUCTIONS
Complete echocardiogram   Complete nuclear stress test   Follow up in cardiology clinic in 4 months or sooner if needed  Follow up with PCP as directed   Please notify clinic if any new concerns or any changes symptoms  ED precautions

## 2024-07-19 NOTE — PROGRESS NOTES
This is a telemedicine note. Patient was treated using telemedicine, real time audio and video, according to [facility] protocols. Maggie THORNTON PA-C, conducted the visit from the [facility]. The patient participated in the visit at a non-[facility] location selected by the patient, identified below. I am licensed in the state where the patient stated they are located. The patient stated that they understood and accepted the privacy and security risks to their information at their location. This visit is not recorded.    Patient was located at the patient's home.       CHIEF COMPLAINT:   Chief Complaint   Patient presents with    Follow-up     Sob with exertion                                                  HPI:  Gloria Denis 58 y.o. female  with a past medical history of hypertension, hyperlipidemia, sleep apnea, diabetes mellitus, depression, anxiety, and chronic back pain who presents for follow up and ongoing care.  Patient completed an echocardiogram on June 30, 2022 which revealed an ejection fraction of 60% with grade 1 diastolic dysfunction.  See report below.  She completed AMBER testing on July 15, 2022 which revealed no significant arterial flow reduction in the bilateral lower extremities.  See report below.   She completed a Lexiscan stress test in July 2020 which revealed no ischemia. She also completed AMBER testing in June 2020 which revealed no evidence of significant arterial insufficiency.  At last office visit patient endorsed stable CROUCH, but otherwise felt well from a cardiac standpoint.      Today the patient states that she feels fairly well that she has notice a bit of progression in her fatigue with exertion and dyspnea on exertion.  She states that she has been noticing dyspnea with anything I do.  She has been noticing it more with her ADLs, although she was still able to complete them.  She also endorses feeling a bit weaker.  She denies any angina or chest  pain symptoms.  She reports feeling a bit lightheaded and dizzy at times but denies any syncopal episodes.  She endorses palpitations that occur not often.  She describes them as her heart racing.  Otherwise she denies any further complaints such as chest pain, syncope, PND, orthopnea, claudication symptoms, or peripheral edema.  She states that she feels knots on her legs but states that they are not painful and not extremely bothersome.  She states that the appearance of them it bothers her.  She denies any DVT symptoms.  As stated above, she was able to complete her ADLs, however recently she was noticed some more significant CROUCH and fatigued.  She does not exercise very much.  She reports following a heart healthy diet.  She reports compliance with all her current medications.  She continues to wear BiPAP on and off.  Encouraged nightly compliance with BiPAP for JORGE ALBERTO.  Educated her on importance of compliance.                                                                                                                                                                                                                                                                                                                                                                                                                                                                            CARDIAC TESTING:  AMBER Testing 7.15.22:                                                                                                       The right lower extremity demonstrated multiphasic waveforms at all levels.   The AMBER on the right was normal at 1.15.  The TBI on the right was normal at 0.77.  The right lower extremity demonstrated no significant arterial flow reduction.     The left lower extremity demonstrated multiphasic waveforms at all levels.   The AMBER on the left was invalid due to non-compressible vessels at the ankle.  The TBI on the  left was normal at 0.80.  The left lower extremity demonstrated no significant arterial flow reduction.     Echo 6.30.22  · The left ventricle is normal in size with concentric remodeling and normal systolic function.  · The estimated ejection fraction is 60%.  · Grade I left ventricular diastolic dysfunction.  · Normal right ventricular size with normal right ventricular systolic function.  · Normal central venous pressure (3 mmHg).  · The estimated PA systolic pressure is 23 mmHg.  · IVC is normal.  · There is no pulmonary hypertension.     Lexiscan stress test July 2020:  Scan is consistent with: No ischemia  No scar  Attenuation artifact in the inferolateral wall  Ejection fraction: 90%.    AMBER testing June 2020:  No evidence of significant arterial insufficiency was identified in the study.    Echocardiogram June 2019:  Left ventricular ejection fraction is measured at approximately 55-60%.  Normal right ventricular size with preserved RV function.  No significant valvular abnormalities.  There is trace tricuspid regurgitation with estimated RVSP of 24 mmHg.  No evidence of pericardial effusion.             Patient Active Problem List   Diagnosis    Mixed anxiety depressive disorder    Chronic back pain    Diabetic neuropathy    Hyperlipidemia LDL goal <70    Primary hypertension    Sleep apnea    Sciatica    Chronic constipation    Gastroesophageal reflux disease    Screening for colon cancer    CKD stage G3b/A2, GFR 30-44 and albumin creatinine ratio  mg/g    Chronic bilateral low back pain with bilateral sciatica    BMI 40.0-44.9, adult    CROUCH (dyspnea on exertion)    Cellulitis    Type 2 diabetes mellitus with stage 3a chronic kidney disease, with long-term current use of insulin    JORGE ALBERTO (obstructive sleep apnea)    Polyneuropathy associated with underlying disease    NPDR (nonproliferative diabetic retinopathy)    DDD (degenerative disc disease), lumbar    Central stenosis of spinal canal    Class 3  severe obesity due to excess calories with serious comorbidity and body mass index (BMI) of 40.0 to 44.9 in adult    Chronic left shoulder pain    Paronychia of second toe    Rash    Acute cystitis without hematuria     Past Surgical History:   Procedure Laterality Date    ABSCESS DRAINAGE      I don't remember the date    ARTHROSCOPIC REMOVAL OF LOOSE BODY FROM JOINT       SECTION      CHOLECYSTECTOMY      Drainage of oral abscess      Exploration using laparoscope      FRACTURE SURGERY  1985    UPPER GASTROINTESTINAL ENDOSCOPY       Social History     Socioeconomic History    Marital status:      Spouse name: Kp   Tobacco Use    Smoking status: Never     Passive exposure: Never    Smokeless tobacco: Never   Substance and Sexual Activity    Alcohol use: Never    Drug use: Never    Sexual activity: Not Currently     Partners: Male     Birth control/protection: None     Social Determinants of Health     Financial Resource Strain: Low Risk  (2/15/2024)    Overall Financial Resource Strain (CARDIA)     Difficulty of Paying Living Expenses: Not hard at all   Recent Concern: Financial Resource Strain - Medium Risk (2023)    Overall Financial Resource Strain (CARDIA)     Difficulty of Paying Living Expenses: Somewhat hard   Food Insecurity: No Food Insecurity (2/15/2024)    Hunger Vital Sign     Worried About Running Out of Food in the Last Year: Never true     Ran Out of Food in the Last Year: Never true   Transportation Needs: No Transportation Needs (2/15/2024)    PRAPARE - Transportation     Lack of Transportation (Medical): No     Lack of Transportation (Non-Medical): No   Physical Activity: Inactive (2/15/2024)    Exercise Vital Sign     Days of Exercise per Week: 0 days     Minutes of Exercise per Session: 0 min   Stress: No Stress Concern Present (2/15/2024)    [employer] - Occupational Stress Questionnaire     Feeling of Stress : Not at all   Housing Stability:  Low Risk  (2/15/2024)    Housing Stability Vital Sign     Unable to Pay for Housing in the Last Year: No     Number of Places Lived in the Last Year: 1     Unstable Housing in the Last Year: No        Family History   Problem Relation Name Age of Onset    Cancer Mother Patrica ROSS Cadena     Heart disease Mother Patrica Laneblanc     Rheum arthritis Mother Patrica Laneblanc     Arthritis Mother Patrica ROSS Cadena     Hypertension Mother Patrica Laneblanc     Miscarriages / Stillbirths Mother Patrica Laneblanc     Diabetes Father Devon Cadena     Heart attack Father Devon Cadena     Kidney failure Father Devon Cadena     Stroke Father Devon Cadena     Arthritis Father Devon Cadena     Hypertension Father Devon Cadena     Kidney disease Father Devno Cadena     Vision loss Father Devon Cadena     Cancer Brother Jalil Cadena     COPD Sister Tanya Cadena     Diabetes Sister Tanya Cadena     COPD Sister Staci Cadena Dominique     Diabetes Sister Staci Cadena Dominique     Diabetes Sister Kae Cadena Laws     Diabetes Sister James Callie Miguel      Review of patient's allergies indicates:   Allergen Reactions    Aspirin          ROS:  Review of Systems   Constitutional:  Positive for malaise/fatigue.   HENT: Negative.     Eyes: Negative.    Respiratory:  Positive for shortness of breath.    Cardiovascular:  Positive for palpitations (Intermittent). Negative for chest pain, orthopnea, claudication, leg swelling and PND.        CROUCH   Gastrointestinal: Negative.    Genitourinary: Negative.    Musculoskeletal: Negative.    Skin: Negative.    Neurological: Negative.  Negative for weakness.   Endo/Heme/Allergies: Negative.    Psychiatric/Behavioral: Negative.                                                                                                                                                                                  Negative except as stated in the history of present  "illness. See HPI for details.    PHYSICAL EXAM:  There were no vitals taken for this visit.    Physical Exam  Constitutional:       Appearance: Normal appearance.   HENT:      Head: Normocephalic.      Nose: Nose normal.   Eyes:      Extraocular Movements: Extraocular movements intact.   Pulmonary:      Effort: Pulmonary effort is normal.   Musculoskeletal:         General: Normal range of motion.   Skin:     General: Skin is warm.   Neurological:      General: No focal deficit present.      Mental Status: She is alert.   Psychiatric:         Mood and Affect: Mood normal.       Physical Exam: LIMITED DUE TO TELEMEDICINE RESTRICTIONS.  General: Alert and oriented, No acute distress.  Head: Normocephalic, Atraumatic.  Eye: Sclera non-icteric.  Neck/Thyroid:  Full range of motion.  Respiratory: Non-labored respirations, Symmetrical chest wall expansion.  Musculoskeletal: Normal range of motion.  Integumentary:  No visible suspicious lesions or rashes. No diaphoresis.   Neurologic: No focal deficits  Psychiatric: Normal interaction, Coherent speech, Euthymic mood, Appropriate affect     Current Outpatient Medications   Medication Instructions    atorvastatin (LIPITOR) 40 mg, Oral, Daily    baclofen (LIORESAL) 10 mg, Oral, 2 times daily    BD CAYLA 2ND GEN PEN NEEDLE 32 gauge x 5/32" Ndle SMARTSIG:Injection Every Night    blood sugar diagnostic Strp To check BG 2 times daily, to use with insurance preferred meter    blood-glucose meter kit To check BG 2 times daily, to use with insurance preferred meter    DULoxetine (CYMBALTA) 60 mg, Oral, Nightly    empagliflozin (JARDIANCE) 10 mg, Oral, Daily    enalapril (VASOTEC) 2.5 mg, Oral, Daily    ergocalciferol (ERGOCALCIFEROL) 50,000 Units, Oral, Every 7 days    hydrOXYzine pamoate (VISTARIL) 25 MG Cap TAKE 1 CAPSULE BY MOUTH THREE TIMES DAILY AS NEEDED FOR ANXIETY    lancets (ACCU-CHEK SOFTCLIX LANCETS) Misc USE 1  TO CHECK GLUCOSE TWICE DAILY    LANTUS SOLOSTAR U-100 INSULIN " 20 Units, Subcutaneous, Nightly    LINZESS 290 mcg, Oral, Daily    metFORMIN (GLUCOPHAGE) 1,000 mg, Oral, 2 times daily with meals    metoprolol tartrate (LOPRESSOR) 25 mg, Oral, 2 times daily    MOUNJARO 5 mg, Subcutaneous, Every 7 days    omeprazole (PRILOSEC) 40 mg, Oral, Daily    ondansetron (ZOFRAN-ODT) 4 mg, Oral, Every 8 hours PRN    sodium zirconium cyclosilicate (LOKELMA) 10 g, Oral, Daily, MIX 1 PACKET ONCE DAILY AS DIRECTED AND  TAKE  BY  MOUTH        All medications, laboratory studies, cardiac diagnostic imaging reviewed.     Lab Results   Component Value Date    LDL 66.00 05/13/2024    .00 02/07/2024    TRIG 103 05/13/2024    TRIG 195 (H) 02/07/2024    CREATININE 1.23 (H) 05/13/2024    K 4.2 05/13/2024        ASSESSMENT/PLAN:    Lightheadedness/Dizziness  - Reports ongoing  episodes of dizziness and lightheadedness since her last visit.    - Not limiting or interfering in ADLs  - Denies any syncopal events  - Repeat Echo      Chest Pain - resolved  CROUCH - worsening  Fatigue  - Reports CROUCH with mild to moderate intensity activities, occasionally with ADLs.  She states that it has slightly worsened since last office visit.   - She also endorses more lightheadedness, dizziness, and fatigue  - Denies chest pain   - Will have patient complete echocardiogram to assess for any underlying heart failure, valvular disease, structural disease that might be contributing to worsening symptoms as listed above   - Will have patient complete nuclear stress test to assess for any underlying ischemic causes of CROUCH, palpitations, and fatigue as they all may be anginal equivalents.  Patient will try to exercise, however she states that she is extremely weak and becomes fatigued very easily so if unable to exercise will convert to regadenoson nuclear stress test  - EF 60% per Echo June 2022  - EF 55-60% per Echo June 2019  - Lexiscan Stress test July 2020 - no ischemia  - ED precautions     Hypertension  - Reports  ""my blood pressure is always good"  - Continue Enalapril and Metoprolol tartrate  - Counseled on low salt diet and increased activity as tolerated     Hyperlipidemia   - LDL 66 per labs May 2024  - Continue Atorvastatin 40mg daily for now  - Strongly encouraged a low cholesterol, low fat diet and increased activity as tolerated     Sleep apnea on BiPAP   - Recommend nightly BiPAP use   - Reports intermittent use currently     Diabetes mellitus  - Management per PCP    Depression/Anxiety  - Management per PCP    Chronic back pain  - Continue management per PCP    Bilateral lower extremity pain  - AMBER testing July 2022:  no significant arterial flow reduction in the bilateral lower extremities  - States that her pain is mostly due to her chronic sciatic nerve pain  - She reports "knots" along the superficial veins of legs. Described to be varicose veins.  They are not painful, she states that they "are just noticeable. Will reassess at next visit and determine if US are needed  - Advised patient to notify clinic of any new concerns or change in symptoms         Complete echocardiogram   Complete nuclear stress test   Follow up in cardiology clinic in 4 months or sooner if needed  Follow up with PCP as directed   Please notify clinic if any new concerns or any changes symptoms  ED precautions    No follow-ups on file. In addition to their scheduled follow up, the patient has also been instructed to follow up on as needed basis.     Future Appointments   Date Time Provider Department Center   7/19/2024  1:30 PM Maggie Hollis PA-C [facility]   10/8/2024  9:40 AM Andreea Borrego FNP [facility]   10/23/2024 10:15 AM Charlene Pineda FNP [facility]        Video Time Documentation:  Spent 10 minutes with patient face to face discussed health concerns. More than 50% of this time was spent in counseling and coordination of care.    Maggie Hollis PA-C    "

## 2024-07-21 DIAGNOSIS — F41.8 MIXED ANXIETY DEPRESSIVE DISORDER: Chronic | ICD-10-CM

## 2024-07-22 RX ORDER — HYDROXYZINE PAMOATE 25 MG/1
CAPSULE ORAL
Qty: 90 CAPSULE | Refills: 0 | Status: SHIPPED | OUTPATIENT
Start: 2024-07-22

## 2024-07-22 NOTE — TELEPHONE ENCOUNTER
Pharmacy requesting refill for pt's hydrOXYzine pamoate (VISTARIL) 25 MG Cap     LOV:5/16/24    NOV:10/8/24

## 2024-08-08 RX ORDER — PEN NEEDLE, DIABETIC 32GX 5/32"
NEEDLE, DISPOSABLE MISCELLANEOUS
Qty: 100 EACH | Refills: 0 | Status: SHIPPED | OUTPATIENT
Start: 2024-08-08

## 2024-08-14 ENCOUNTER — TELEPHONE (OUTPATIENT)
Dept: INTERNAL MEDICINE | Facility: CLINIC | Age: 59
End: 2024-08-14

## 2024-08-16 ENCOUNTER — PATIENT MESSAGE (OUTPATIENT)
Dept: INTERNAL MEDICINE | Facility: CLINIC | Age: 59
End: 2024-08-16
Payer: MEDICARE

## 2024-08-16 RX ORDER — PEN NEEDLE, DIABETIC 32GX 5/32"
NEEDLE, DISPOSABLE MISCELLANEOUS
Qty: 100 EACH | Refills: 0 | OUTPATIENT
Start: 2024-08-16

## 2024-08-22 DIAGNOSIS — K59.09 CHRONIC CONSTIPATION: ICD-10-CM

## 2024-08-23 RX ORDER — LINACLOTIDE 290 UG/1
290 CAPSULE, GELATIN COATED ORAL
Qty: 90 CAPSULE | Refills: 0 | Status: SHIPPED | OUTPATIENT
Start: 2024-08-23

## 2024-08-23 NOTE — TELEPHONE ENCOUNTER
Please see the attached refill request. Patient last seen 7/17/2023 with no upcoming appointments

## 2024-09-06 DIAGNOSIS — E11.65 UNCONTROLLED TYPE 2 DIABETES MELLITUS WITH HYPERGLYCEMIA: ICD-10-CM

## 2024-09-06 RX ORDER — METFORMIN HYDROCHLORIDE 1000 MG/1
1000 TABLET ORAL 2 TIMES DAILY WITH MEALS
Qty: 180 TABLET | Refills: 0 | Status: SHIPPED | OUTPATIENT
Start: 2024-09-06

## 2024-09-06 NOTE — TELEPHONE ENCOUNTER
Pharmacy requesting refill for pt's metFORMIN (GLUCOPHAGE) 1000 MG tablet     LOV: 5/16/24    NOV:10/8/24

## 2024-09-08 DIAGNOSIS — I10 PRIMARY HYPERTENSION: Chronic | ICD-10-CM

## 2024-09-08 DIAGNOSIS — E78.5 HYPERLIPIDEMIA LDL GOAL <70: ICD-10-CM

## 2024-09-09 RX ORDER — METOPROLOL TARTRATE 25 MG/1
25 TABLET, FILM COATED ORAL 2 TIMES DAILY
Qty: 180 TABLET | Refills: 2 | Status: SHIPPED | OUTPATIENT
Start: 2024-09-09

## 2024-09-09 RX ORDER — ATORVASTATIN CALCIUM 40 MG/1
40 TABLET, FILM COATED ORAL
Qty: 90 TABLET | Refills: 2 | Status: SHIPPED | OUTPATIENT
Start: 2024-09-09

## 2024-09-17 ENCOUNTER — TELEPHONE (OUTPATIENT)
Dept: NEPHROLOGY | Facility: CLINIC | Age: 59
End: 2024-09-17
Payer: MEDICARE

## 2024-09-17 NOTE — TELEPHONE ENCOUNTER
----- Message from Narciso Key LPN sent at 9/17/2024 10:08 AM CDT -----  Regarding: FW: please advise    ----- Message -----  From: Charlene Pineda FNP  Sent: 9/17/2024   9:40 AM CDT  To: Narciso Key LPN  Subject: RE: please advise                                Please let her know that she can stop Metformin, Thank you  ----- Message -----  From: Narciso Key LPN  Sent: 9/16/2024   2:24 PM CDT  To: ALYX Mercado  Subject: FW: please advise                                  ----- Message -----  From: Iris Aranda  Sent: 9/16/2024   1:55 PM CDT  To: [facility] Clinical Support Staff  Subject: please advise                                    Pt is in need of a call in regards to Blood work. Pt stated that her Sugar keeps going low. Pt wants to know what medication she should get off. Pt can be reached at [phone]    Thank You

## 2024-09-29 DIAGNOSIS — M51.369 DDD (DEGENERATIVE DISC DISEASE), LUMBAR: Chronic | ICD-10-CM

## 2024-09-29 DIAGNOSIS — M48.00 CENTRAL STENOSIS OF SPINAL CANAL: Chronic | ICD-10-CM

## 2024-09-29 DIAGNOSIS — Z79.4 TYPE 2 DIABETES MELLITUS WITH STAGE 3A CHRONIC KIDNEY DISEASE, WITH LONG-TERM CURRENT USE OF INSULIN: Chronic | ICD-10-CM

## 2024-09-29 DIAGNOSIS — N18.31 TYPE 2 DIABETES MELLITUS WITH STAGE 3A CHRONIC KIDNEY DISEASE, WITH LONG-TERM CURRENT USE OF INSULIN: Chronic | ICD-10-CM

## 2024-09-29 DIAGNOSIS — E11.22 TYPE 2 DIABETES MELLITUS WITH STAGE 3A CHRONIC KIDNEY DISEASE, WITH LONG-TERM CURRENT USE OF INSULIN: Chronic | ICD-10-CM

## 2024-09-30 NOTE — TELEPHONE ENCOUNTER
Pt requesting refills on tirzepatide (MOUNJARO) 5 mg/0.5 mL PnIj and baclofen (LIORESAL) 10 MG tablet.    LOV:5/16/24    NOV: 10/8/24

## 2024-10-01 ENCOUNTER — HOSPITAL ENCOUNTER (OUTPATIENT)
Dept: RADIOLOGY | Facility: HOSPITAL | Age: 59
Discharge: HOME | End: 2024-10-01
Payer: MEDICARE

## 2024-10-01 ENCOUNTER — HOSPITAL ENCOUNTER (OUTPATIENT)
Dept: CARDIOLOGY | Facility: HOSPITAL | Age: 59
Discharge: HOME | End: 2024-10-01
Payer: MEDICARE

## 2024-10-01 DIAGNOSIS — T73.3XXA FATIGUE DUE TO EXCESSIVE EXERTION, INITIAL ENCOUNTER: ICD-10-CM

## 2024-10-01 DIAGNOSIS — R06.09 DOE (DYSPNEA ON EXERTION): ICD-10-CM

## 2024-10-01 LAB
OHS CV CPX 85 PERCENT MAX PREDICTED HEART RATE MALE: 138
OHS CV CPX MAX PREDICTED HEART RATE: 162
OHS CV CPX PATIENT IS FEMALE: 1
OHS CV CPX PATIENT IS MALE: 0
OHS CV INITIAL DOSE: 10.6 MCG/KG/MIN
OHS CV PEAK DOSE: 32.8 MCG/KG/MIN

## 2024-10-01 PROCEDURE — 78452 HT MUSCLE IMAGE SPECT MULT: CPT

## 2024-10-01 PROCEDURE — 93017 CV STRESS TEST TRACING ONLY: CPT

## 2024-10-01 PROCEDURE — A9500 TC99M SESTAMIBI: HCPCS

## 2024-10-01 PROCEDURE — 63600175 PHARM REV CODE 636 W HCPCS

## 2024-10-01 RX ORDER — REGADENOSON 0.08 MG/ML
0.4 INJECTION, SOLUTION INTRAVENOUS
Status: COMPLETED | OUTPATIENT
Start: 2024-10-01 | End: 2024-10-01

## 2024-10-01 RX ORDER — TETRAKIS(2-METHOXYISOBUTYLISOCYANIDE)COPPER(I) TETRAFLUOROBORATE 1 MG/ML
32.8 INJECTION, POWDER, LYOPHILIZED, FOR SOLUTION INTRAVENOUS
Status: COMPLETED | OUTPATIENT
Start: 2024-10-01 | End: 2024-10-01

## 2024-10-01 RX ORDER — TIRZEPATIDE 5 MG/.5ML
5 INJECTION, SOLUTION SUBCUTANEOUS
Qty: 4 PEN | Refills: 0 | Status: SHIPPED | OUTPATIENT
Start: 2024-10-01 | End: 2025-10-01

## 2024-10-01 RX ORDER — BACLOFEN 10 MG/1
10 TABLET ORAL 2 TIMES DAILY
Qty: 60 TABLET | Refills: 0 | Status: SHIPPED | OUTPATIENT
Start: 2024-10-01

## 2024-10-01 RX ORDER — TETRAKIS(2-METHOXYISOBUTYLISOCYANIDE)COPPER(I) TETRAFLUOROBORATE 1 MG/ML
10.6 INJECTION, POWDER, LYOPHILIZED, FOR SOLUTION INTRAVENOUS
Status: COMPLETED | OUTPATIENT
Start: 2024-10-01 | End: 2024-10-01

## 2024-10-01 RX ADMIN — KIT FOR THE PREPARATION OF TECHNETIUM TC99M SESTAMIBI 10.6 MILLICURIE: 1 INJECTION, POWDER, LYOPHILIZED, FOR SOLUTION PARENTERAL at 08:10

## 2024-10-01 RX ADMIN — REGADENOSON 0.4 MG: 0.08 INJECTION, SOLUTION INTRAVENOUS at 09:10

## 2024-10-01 RX ADMIN — KIT FOR THE PREPARATION OF TECHNETIUM TC99M SESTAMIBI 32.8 MILLICURIE: 1 INJECTION, POWDER, LYOPHILIZED, FOR SOLUTION PARENTERAL at 09:10

## 2024-10-03 ENCOUNTER — TELEPHONE (OUTPATIENT)
Dept: CARDIOLOGY | Facility: CLINIC | Age: 59
End: 2024-10-03
Payer: MEDICARE

## 2024-10-03 NOTE — TELEPHONE ENCOUNTER
Left voicemail for patient regarding results of stress test.  Stress test was normal without any evidence of ischemia or infarction.    Maggie Hollis PA-C

## 2024-10-07 NOTE — PROGRESS NOTES
Lindsey Carbajal, ALYX   [facility]  [address] [facility] [id number]      PATIENT NAME: Gloria Denis  : 1965  DATE: 10/8/24  MRN: [id number]      Reason for Visit / Chief Complaint: Diabetes (Pt stated she is here for diabetes f/u.)       History of Present Illness / Problem Focused Workflow     Gloria Denis presents to the clinic with Diabetes (Pt stated she is here for diabetes f/u.)     58 y.o.  female presents to the clinic.  Medical problems include HTN, dyslipidemia, JORGE ALBERTO on Bipap, DM, DM neuropathy, depression, anxiety, CKD, hyperkalemia, L NPDR, GERD, chronic constipation, morbid obesity, atrophic vaginitis, Yolanda-Yolanda dz, and chronic back pain. Followed by [facility] cardio, GYN, renal and GI clinics. Followed by Dr Hughes in [place] annually, declines screening photos in clinic.     10/8/24  Pt presents for DM follow up. A1c and BMP ordered, but not completed. POC A1C today 6.1, she is reporting compliance with medications, renal d/c metformin. Less appetite suppression with mounjaro over the last month. [place] goal agreed to reduce insulin dose and titrate mounjaro up with goal of improved insulin resistance and weight loss. Will send mounjaro 7.5, pt to reduce lantus to 15U QHS once she starts new dose. She is compliant with CBG checks, FBG around 120 most days. Agreeable to DM foot exam. Mammogram ordered for next month. RTC in 2 months for DM follow up and continue med titration.           Review of Systems     Review of Systems   Constitutional:  Negative for fatigue, fever and unexpected weight change.   HENT:  Negative for ear pain, hearing loss, trouble swallowing and voice change.    Respiratory:  Negative for cough and shortness of breath.    Cardiovascular:  Negative for chest pain and palpitations.   Gastrointestinal:  Negative for abdominal pain, diarrhea and vomiting.  "  Genitourinary:  Negative for dysuria.   Musculoskeletal:  Negative for gait problem.   Skin:  Negative for rash and wound.   Neurological:  Negative for weakness.   Psychiatric/Behavioral:  Negative for suicidal ideas.          Medications and Allergies     Medications  Medication List with Changes/Refills   New Medications    TIRZEPATIDE 7.5 MG/0.5 ML PNIJ    Inject 7.5 mg into the skin every 7 days.   Current Medications    ATORVASTATIN (LIPITOR) 40 MG TABLET    Take 1 tablet by mouth once daily    BACLOFEN (LIORESAL) 10 MG TABLET    Take 1 tablet (10 mg total) by mouth 2 (two) times daily.    BD CAYLA 2ND GEN PEN NEEDLE 32 GAUGE X 5/32" NDLE    USE TO INJECT INSULIN EVERY  NIGHT    BLOOD SUGAR DIAGNOSTIC STRP    To check BG 2 times daily, to use with insurance preferred meter    BLOOD-GLUCOSE METER KIT    To check BG 2 times daily, to use with insurance preferred meter    ERGOCALCIFEROL (ERGOCALCIFEROL) 50,000 UNIT CAP    Take 1 capsule (50,000 Units total) by mouth every 7 days.    LANCETS (ACCU-CHEK SOFTCLIX LANCETS) MISC    USE 1  TO CHECK GLUCOSE TWICE DAILY    METFORMIN (GLUCOPHAGE) 1000 MG TABLET    TAKE 1 TABLET BY MOUTH TWICE DAILY WITH MEALS    METOPROLOL TARTRATE (LOPRESSOR) 25 MG TABLET    Take 1 tablet by mouth twice daily    ONDANSETRON (ZOFRAN-ODT) 4 MG TBDL    Take 1 tablet (4 mg total) by mouth every 8 (eight) hours as needed (nausea and vomiting).    SODIUM ZIRCONIUM CYCLOSILICATE (LOKELMA) 10 GRAM PACKET    Take 1 packet (10 g total) by mouth once daily. MIX 1 PACKET ONCE DAILY AS DIRECTED AND  TAKE  BY  MOUTH   Changed and/or Refilled Medications    Modified Medication Previous Medication    DULOXETINE (CYMBALTA) 60 MG CAPSULE DULoxetine (CYMBALTA) 60 MG capsule       Take 1 capsule (60 mg total) by mouth every evening.    Take 1 capsule (60 mg total) by mouth every evening.    EMPAGLIFLOZIN (JARDIANCE) 10 MG TABLET empagliflozin (JARDIANCE) 10 mg tablet       Take 1 tablet (10 mg total) by " mouth once daily.    Take 1 tablet (10 mg total) by mouth once daily.    ENALAPRIL (VASOTEC) 2.5 MG TABLET enalapril (VASOTEC) 2.5 MG tablet       Take 1 tablet (2.5 mg total) by mouth once daily.    Take 1 tablet (2.5 mg total) by mouth once daily.    HYDROXYZINE PAMOATE (VISTARIL) 25 MG CAP hydrOXYzine pamoate (VISTARIL) 25 MG Cap       Take 1 capsule (25 mg total) by mouth 3 (three) times daily as needed (anxiety).    TAKE 1 CAPSULE BY MOUTH THREE TIMES DAILY AS NEEDED FOR ANXIETY    INSULIN GLARGINE U-100, LANTUS, (LANTUS SOLOSTAR U-100 INSULIN) 100 UNIT/ML (3 ML) INPN PEN insulin glargine U-100, Lantus, (LANTUS SOLOSTAR U-100 INSULIN) 100 unit/mL (3 mL) InPn pen       Inject 20 Units into the skin every evening.    Inject 20 Units into the skin every evening.    LINZESS 290 MCG CAP CAPSULE LINZESS 290 mcg Cap capsule       Take 1 capsule (290 mcg total) by mouth before breakfast.    Take 1 capsule by mouth once daily    OMEPRAZOLE (PRILOSEC) 40 MG CAPSULE omeprazole (PRILOSEC) 40 MG capsule       Take 1 capsule (40 mg total) by mouth once daily.    Take 1 capsule (40 mg total) by mouth once daily.   Discontinued Medications    TIRZEPATIDE (MOUNJARO) 5 MG/0.5 ML PNIJ    Inject 5 mg into the skin every 7 days.         Allergies  Review of patient's allergies indicates:   Allergen Reactions    Aspirin        Physical Examination     Vitals:    10/08/24 0930   BP: 102/63   Pulse: 77   Resp: 18     Physical Exam  Constitutional:       Appearance: Normal appearance.   Cardiovascular:      Rate and Rhythm: Normal rate and regular rhythm.      Pulses:           Dorsalis pedis pulses are 2+ on the right side and 2+ on the left side.        Posterior tibial pulses are 2+ on the right side and 2+ on the left side.   Pulmonary:      Effort: Pulmonary effort is normal.      Breath sounds: Normal breath sounds.   Musculoskeletal:         General: Normal range of motion.      Cervical back: Normal range of motion.   Feet:       Right foot:      Protective Sensation: 9 sites tested.  9 sites sensed.      Skin integrity: Skin integrity normal.      Toenail Condition: Right toenails are normal.      Left foot:      Protective Sensation: 9 sites tested.  9 sites sensed.      Skin integrity: Skin integrity normal.      Toenail Condition: Left toenails are normal.   Skin:     General: Skin is warm and dry.   Neurological:      General: No focal deficit present.      Mental Status: She is alert and oriented to person, place, and time.   Psychiatric:         Mood and Affect: Mood normal.           Results     Lab Results   Component Value Date    WBC 7.08 03/13/2024    RBC 4.41 03/13/2024    HGB 12.2 03/13/2024    HCT 38.2 03/13/2024    MCV 86.6 03/13/2024    MCH 27.7 03/13/2024    MCHC 31.9 (L) 03/13/2024    RDW 13.5 03/13/2024     03/13/2024    MPV 9.6 03/13/2024     Sodium   Date Value Ref Range Status   05/13/2024 140 136 - 145 mmol/L Final     Potassium   Date Value Ref Range Status   05/13/2024 4.2 3.5 - 5.1 mmol/L Final     Chloride   Date Value Ref Range Status   05/13/2024 111 (H) 98 - 107 mmol/L Final     CO2   Date Value Ref Range Status   05/13/2024 23 22 - 29 mmol/L Final     Blood Urea Nitrogen   Date Value Ref Range Status   05/13/2024 15.0 9.8 - 20.1 mg/dL Final     Creatinine   Date Value Ref Range Status   05/13/2024 1.23 (H) 0.55 - 1.02 mg/dL Final     Calcium   Date Value Ref Range Status   05/13/2024 9.3 8.4 - 10.2 mg/dL Final     Albumin   Date Value Ref Range Status   05/13/2024 3.5 3.5 - 5.0 g/dL Final     Bilirubin Total   Date Value Ref Range Status   05/13/2024 0.4 <=1.5 mg/dL Final     ALP   Date Value Ref Range Status   05/13/2024 75 40 - 150 unit/L Final     AST   Date Value Ref Range Status   05/13/2024 29 5 - 34 unit/L Final     ALT   Date Value Ref Range Status   05/13/2024 19 0 - 55 unit/L Final     Estimated GFR-Non    Date Value Ref Range Status   02/21/2022 49 >=90      Lab Results    Component Value Date    CHOL 119 05/13/2024     Lab Results   Component Value Date    HDL 32 (L) 05/13/2024     Lab Results   Component Value Date    TRIG 103 05/13/2024     Lab Results   Component Value Date    VLDL 21 05/13/2024     Lab Results   Component Value Date    LDL 66.00 05/13/2024     Lab Results   Component Value Date    TSH 1.332 02/07/2024     Lab Results   Component Value Date    PHUR 5.0 05/13/2024    PROTEINUA 1+ (A) 05/13/2024    GLUCUA Negative 05/13/2024    KETONESU Negative 02/21/2022    OCCULTUA Trace-Intact (A) 05/13/2024    NITRITE Positive (A) 05/13/2024    LEUKOCYTESUR 1+ (A) 05/13/2024     Lab Results   Component Value Date    HGBA1C 6.6 05/13/2024    HGBA1C 8.2 (H) 02/07/2024    HGBA1C 7.0 08/08/2023           Assessment         ICD-10-CM ICD-9-CM   1. Type 2 diabetes mellitus with stage 3a chronic kidney disease, with long-term current use of insulin  E11.22 250.40    N18.31 585.3    Z79.4 V58.67   2. Breast cancer screening by mammogram  Z12.31 V76.12   3. Mixed anxiety depressive disorder  F41.8 300.4   4. Chronic constipation  K59.09 564.00   5. Gastroesophageal reflux disease, unspecified whether esophagitis present  K21.9 530.81   6. Primary hypertension  I10 401.9   7. NPDR (nonproliferative diabetic retinopathy)  E11.3299 250.50     362.03       Plan      Problem List Items Addressed This Visit          Psychiatric    Mixed anxiety depressive disorder (Chronic)    Current Assessment & Plan     Refilled cymbalta.  Denies SI/HI.  Read positive daily meditations, avoid negative media, set healthy boundaries.  Exercise daily, keep consistent sleep pattern, eat a healthy diet.  Establish good social support, make changes to reduce stress.  Do not drink alcohol or use illicit drugs.  Reports any symptoms of suicidal/homicidal ideations or self harm immediately, go to nearest emergency room.            Relevant Medications    DULoxetine (CYMBALTA) 60 MG capsule    hydrOXYzine pamoate  (VISTARIL) 25 MG Cap       Ophtho    NPDR (nonproliferative diabetic retinopathy) (Chronic)    Current Assessment & Plan     Declines eye photo  Follow up with Dr Amezcua in [place] as scheduled         Relevant Medications    insulin glargine U-100, Lantus, (LANTUS SOLOSTAR U-100 INSULIN) 100 unit/mL (3 mL) InPn pen       Cardiac/Vascular    Primary hypertension (Chronic)    Current Assessment & Plan     At goal.  Continue enalapril and metoprolol.  Follow a low sodium (less than 2 grams of sodium per day), DASH diet.   Continue medications as prescribed.  Monitor blood pressure and report any consistent values greater than 140/90 and keep a log.  Maintain healthy weight with a BMI goal of <30.   Aerobic exercise for 150 minutes per week (or 5 days a week for 30 minutes each day).          Relevant Medications    enalapril (VASOTEC) 2.5 MG tablet       Endocrine    Type 2 diabetes mellitus with stage 3a chronic kidney disease, with long-term current use of insulin - Primary (Chronic)    Current Assessment & Plan     A1C at goal 6.1  Metformin d/alfonso, continue other meds  Will inc Mounjaro to 7.5mg next refill- reduce Lantus to 15U at that time and monitor CBGs.   Follow ADA Diet. Avoid soda, simple sweets, and limit rice/pasta/breads/starches (no more than 45-50 grams per meal).  Maintain healthy weight with goal BMI <30.  Exercise 5 times per week for 30 minutes per day.  Stressed importance of daily foot exams.  Stressed importance of annual dilated eye exam.         Relevant Medications    empagliflozin (JARDIANCE) 10 mg tablet    insulin glargine U-100, Lantus, (LANTUS SOLOSTAR U-100 INSULIN) 100 unit/mL (3 mL) InPn pen    tirzepatide 7.5 mg/0.5 mL PnIj    Other Relevant Orders    POCT HEMOGLOBIN A1C (Completed)    Diabetic Eye Screening Photo       GI    Chronic constipation    Relevant Medications    LINZESS 290 mcg Cap capsule    Gastroesophageal reflux disease    Relevant Medications    omeprazole  (PRILOSEC) 40 MG capsule     Other Visit Diagnoses       Breast cancer screening by mammogram        Relevant Orders    Mammo Digital Screening Bilat            Future Appointments   Date Time Provider Department Center   10/23/2024 10:15 AM Charlene Pineda FNP [facility]   11/20/2024 10:15 AM Maggie Hollis PA-C [facility]   12/12/2024  8:20 AM Lindsey Carbajal FNP [facility]            Signature:      [facility]  [address] [address]    Date of encounter: 10/8/24

## 2024-10-08 ENCOUNTER — OFFICE VISIT (OUTPATIENT)
Dept: INTERNAL MEDICINE | Facility: CLINIC | Age: 59
End: 2024-10-08
Payer: MEDICARE

## 2024-10-08 VITALS
BODY MASS INDEX: 38.08 KG/M2 | DIASTOLIC BLOOD PRESSURE: 63 MMHG | HEIGHT: 61 IN | OXYGEN SATURATION: 100 % | RESPIRATION RATE: 18 BRPM | HEART RATE: 77 BPM | SYSTOLIC BLOOD PRESSURE: 102 MMHG | WEIGHT: 201.69 LBS

## 2024-10-08 DIAGNOSIS — F41.8 MIXED ANXIETY DEPRESSIVE DISORDER: Chronic | ICD-10-CM

## 2024-10-08 DIAGNOSIS — K21.9 GASTROESOPHAGEAL REFLUX DISEASE, UNSPECIFIED WHETHER ESOPHAGITIS PRESENT: ICD-10-CM

## 2024-10-08 DIAGNOSIS — N18.31 TYPE 2 DIABETES MELLITUS WITH STAGE 3A CHRONIC KIDNEY DISEASE, WITH LONG-TERM CURRENT USE OF INSULIN: Primary | Chronic | ICD-10-CM

## 2024-10-08 DIAGNOSIS — E11.3299 NPDR (NONPROLIFERATIVE DIABETIC RETINOPATHY): Chronic | ICD-10-CM

## 2024-10-08 DIAGNOSIS — Z79.4 TYPE 2 DIABETES MELLITUS WITH STAGE 3A CHRONIC KIDNEY DISEASE, WITH LONG-TERM CURRENT USE OF INSULIN: Primary | Chronic | ICD-10-CM

## 2024-10-08 DIAGNOSIS — K59.09 CHRONIC CONSTIPATION: ICD-10-CM

## 2024-10-08 DIAGNOSIS — I10 PRIMARY HYPERTENSION: Chronic | ICD-10-CM

## 2024-10-08 DIAGNOSIS — E11.22 TYPE 2 DIABETES MELLITUS WITH STAGE 3A CHRONIC KIDNEY DISEASE, WITH LONG-TERM CURRENT USE OF INSULIN: Primary | Chronic | ICD-10-CM

## 2024-10-08 DIAGNOSIS — Z12.31 BREAST CANCER SCREENING BY MAMMOGRAM: ICD-10-CM

## 2024-10-08 LAB — HBA1C MFR BLD: 6.1 %

## 2024-10-08 PROCEDURE — 83036 HEMOGLOBIN GLYCOSYLATED A1C: CPT | Mod: PBBFAC

## 2024-10-08 PROCEDURE — 1160F RVW MEDS BY RX/DR IN RCRD: CPT | Mod: CPTII,,,

## 2024-10-08 PROCEDURE — 3066F NEPHROPATHY DOC TX: CPT | Mod: CPTII,,,

## 2024-10-08 PROCEDURE — 1159F MED LIST DOCD IN RCRD: CPT | Mod: CPTII,,,

## 2024-10-08 PROCEDURE — 3008F BODY MASS INDEX DOCD: CPT | Mod: CPTII,,,

## 2024-10-08 PROCEDURE — 3060F POS MICROALBUMINURIA REV: CPT | Mod: CPTII,,,

## 2024-10-08 PROCEDURE — 4010F ACE/ARB THERAPY RXD/TAKEN: CPT | Mod: CPTII,,,

## 2024-10-08 PROCEDURE — 3044F HG A1C LEVEL LT 7.0%: CPT | Mod: CPTII,,,

## 2024-10-08 PROCEDURE — 3074F SYST BP LT 130 MM HG: CPT | Mod: CPTII,,,

## 2024-10-08 PROCEDURE — 99214 OFFICE O/P EST MOD 30 MIN: CPT | Mod: S$PBB,,,

## 2024-10-08 PROCEDURE — 99214 OFFICE O/P EST MOD 30 MIN: CPT | Mod: PBBFAC

## 2024-10-08 PROCEDURE — 3078F DIAST BP <80 MM HG: CPT | Mod: CPTII,,,

## 2024-10-08 RX ORDER — DULOXETIN HYDROCHLORIDE 60 MG/1
60 CAPSULE, DELAYED RELEASE ORAL NIGHTLY
Qty: 90 CAPSULE | Refills: 2 | Status: SHIPPED | OUTPATIENT
Start: 2024-10-08 | End: 2025-07-05

## 2024-10-08 RX ORDER — INSULIN GLARGINE 100 [IU]/ML
20 INJECTION, SOLUTION SUBCUTANEOUS NIGHTLY
Qty: 6 ML | Refills: 2 | Status: SHIPPED | OUTPATIENT
Start: 2024-10-08

## 2024-10-08 RX ORDER — OMEPRAZOLE 40 MG/1
40 CAPSULE, DELAYED RELEASE ORAL DAILY
Qty: 90 CAPSULE | Refills: 2 | Status: SHIPPED | OUTPATIENT
Start: 2024-10-08 | End: 2025-07-05

## 2024-10-08 RX ORDER — LINACLOTIDE 290 UG/1
290 CAPSULE, GELATIN COATED ORAL
Qty: 90 CAPSULE | Refills: 2 | Status: SHIPPED | OUTPATIENT
Start: 2024-10-08 | End: 2025-07-05

## 2024-10-08 RX ORDER — ENALAPRIL MALEATE 2.5 MG/1
2.5 TABLET ORAL DAILY
Qty: 90 TABLET | Refills: 2 | Status: SHIPPED | OUTPATIENT
Start: 2024-10-08 | End: 2025-07-05

## 2024-10-08 RX ORDER — HYDROXYZINE PAMOATE 25 MG/1
25 CAPSULE ORAL 3 TIMES DAILY PRN
Qty: 90 CAPSULE | Refills: 1 | Status: SHIPPED | OUTPATIENT
Start: 2024-10-08

## 2024-10-08 NOTE — ASSESSMENT & PLAN NOTE
A1C at goal 6.1  Metformin d/alfonso, continue other meds  Will inc Mounjaro to 7.5mg next refill- reduce Lantus to 15U at that time and monitor CBGs.   Follow ADA Diet. Avoid soda, simple sweets, and limit rice/pasta/breads/starches (no more than 45-50 grams per meal).  Maintain healthy weight with goal BMI <30.  Exercise 5 times per week for 30 minutes per day.  Stressed importance of daily foot exams.  Stressed importance of annual dilated eye exam.

## 2024-10-16 DIAGNOSIS — N18.9 CHRONIC KIDNEY DISEASE, UNSPECIFIED CKD STAGE: Primary | ICD-10-CM

## 2024-10-19 DIAGNOSIS — Z79.4 TYPE 2 DIABETES MELLITUS WITH STAGE 3A CHRONIC KIDNEY DISEASE, WITH LONG-TERM CURRENT USE OF INSULIN: ICD-10-CM

## 2024-10-19 DIAGNOSIS — E11.22 TYPE 2 DIABETES MELLITUS WITH STAGE 3A CHRONIC KIDNEY DISEASE, WITH LONG-TERM CURRENT USE OF INSULIN: ICD-10-CM

## 2024-10-19 DIAGNOSIS — N18.31 TYPE 2 DIABETES MELLITUS WITH STAGE 3A CHRONIC KIDNEY DISEASE, WITH LONG-TERM CURRENT USE OF INSULIN: ICD-10-CM

## 2024-10-21 ENCOUNTER — PATIENT MESSAGE (OUTPATIENT)
Dept: INTERNAL MEDICINE | Facility: CLINIC | Age: 59
End: 2024-10-21
Payer: MEDICARE

## 2024-10-21 ENCOUNTER — TELEPHONE (OUTPATIENT)
Dept: INTERNAL MEDICINE | Facility: CLINIC | Age: 59
End: 2024-10-21
Payer: MEDICARE

## 2024-10-21 ENCOUNTER — LAB VISIT (OUTPATIENT)
Dept: LAB | Facility: HOSPITAL | Age: 59
End: 2024-10-21
Attending: NURSE PRACTITIONER
Payer: MEDICARE

## 2024-10-21 DIAGNOSIS — E55.9 VITAMIN D DEFICIENCY: Primary | ICD-10-CM

## 2024-10-21 DIAGNOSIS — N18.9 CHRONIC KIDNEY DISEASE, UNSPECIFIED CKD STAGE: ICD-10-CM

## 2024-10-21 DIAGNOSIS — N18.31 TYPE 2 DIABETES MELLITUS WITH STAGE 3A CHRONIC KIDNEY DISEASE, WITH LONG-TERM CURRENT USE OF INSULIN: Chronic | ICD-10-CM

## 2024-10-21 DIAGNOSIS — Z79.4 TYPE 2 DIABETES MELLITUS WITH STAGE 3A CHRONIC KIDNEY DISEASE, WITH LONG-TERM CURRENT USE OF INSULIN: Chronic | ICD-10-CM

## 2024-10-21 DIAGNOSIS — E11.22 TYPE 2 DIABETES MELLITUS WITH STAGE 3A CHRONIC KIDNEY DISEASE, WITH LONG-TERM CURRENT USE OF INSULIN: Chronic | ICD-10-CM

## 2024-10-21 LAB
ALBUMIN SERPL-MCNC: 3.3 G/DL (ref 3.5–5)
ALBUMIN/GLOB SERPL: 0.9 RATIO (ref 1.1–2)
ALP SERPL-CCNC: 82 UNIT/L (ref 40–150)
ALT SERPL-CCNC: 44 UNIT/L (ref 0–55)
ANION GAP SERPL CALC-SCNC: 7 MEQ/L
AST SERPL-CCNC: 43 UNIT/L (ref 5–34)
BACTERIA #/AREA URNS AUTO: NORMAL /HPF
BILIRUB SERPL-MCNC: 0.4 MG/DL
BILIRUB UR QL STRIP.AUTO: NEGATIVE
BUN SERPL-MCNC: 18 MG/DL (ref 9.8–20.1)
CALCIUM SERPL-MCNC: 9.2 MG/DL (ref 8.4–10.2)
CHLORIDE SERPL-SCNC: 110 MMOL/L (ref 98–107)
CLARITY UR: CLEAR
CO2 SERPL-SCNC: 24 MMOL/L (ref 22–29)
COLOR UR AUTO: YELLOW
CREAT SERPL-MCNC: 1.18 MG/DL (ref 0.55–1.02)
CREAT UR-MCNC: 81.4 MG/DL (ref 45–106)
CREAT/UREA NIT SERPL: 15
GFR SERPLBLD CREATININE-BSD FMLA CKD-EPI: 54 ML/MIN/1.73/M2
GLOBULIN SER-MCNC: 3.7 GM/DL (ref 2.4–3.5)
GLUCOSE SERPL-MCNC: 120 MG/DL (ref 74–100)
GLUCOSE UR QL STRIP: ABNORMAL
HGB UR QL STRIP: NEGATIVE
KETONES UR QL STRIP: NEGATIVE
LEUKOCYTE ESTERASE UR QL STRIP: ABNORMAL
NITRITE UR QL STRIP: NEGATIVE
PH UR STRIP: 5.5 [PH]
POTASSIUM SERPL-SCNC: 4.7 MMOL/L (ref 3.5–5.1)
PROT SERPL-MCNC: 7 GM/DL (ref 6.4–8.3)
PROT UR QL STRIP: NEGATIVE
PROT UR STRIP-MCNC: <6.8 MG/DL
RBC #/AREA URNS AUTO: NORMAL /HPF
SODIUM SERPL-SCNC: 141 MMOL/L (ref 136–145)
SP GR UR STRIP.AUTO: 1.01 (ref 1–1.03)
SQUAMOUS #/AREA URNS AUTO: NORMAL /HPF
UROBILINOGEN UR STRIP-ACNC: 0.2
WBC #/AREA URNS AUTO: NORMAL /HPF

## 2024-10-21 PROCEDURE — 82570 ASSAY OF URINE CREATININE: CPT

## 2024-10-21 PROCEDURE — 81003 URINALYSIS AUTO W/O SCOPE: CPT

## 2024-10-21 PROCEDURE — 36415 COLL VENOUS BLD VENIPUNCTURE: CPT

## 2024-10-21 PROCEDURE — 84156 ASSAY OF PROTEIN URINE: CPT

## 2024-10-21 PROCEDURE — 80053 COMPREHEN METABOLIC PANEL: CPT

## 2024-10-21 NOTE — TELEPHONE ENCOUNTER
I will add vitamin D per her request and a BMP include potassium. But this pt does not have a follow up with me or labs due until December.     Thanks,   ALYX Berg  ----- Message from Nurse Lora sent at 10/21/2024  7:58 AM CDT -----  Regarding: FW: orders    ----- Message -----  From: Kalli Ledesma  Sent: 10/21/2024   7:38 AM CDT  To: [facility] Staff  Subject: orders                                           .Who Called: Gloria Denis    Caller is requesting assistance/information from provider's office.    Symptoms (please be specific):    How long has patient had these symptoms:    List of preferred pharmacies on file (remove unneeded): [unfilled]  If different, enter pharmacy into here including location and phone number:       Preferred Method of Contact: Phone Call  Patient's Preferred Phone Number on File: [phone]   Best Call Back Number, if different:  Additional Information: pt is requesting vitamin D and potassium be added to her orders pt state she is on her way to to her blood work this morning

## 2024-10-23 ENCOUNTER — OFFICE VISIT (OUTPATIENT)
Dept: NEPHROLOGY | Facility: CLINIC | Age: 59
End: 2024-10-23
Payer: MEDICARE

## 2024-10-23 VITALS
BODY MASS INDEX: 38.34 KG/M2 | RESPIRATION RATE: 18 BRPM | DIASTOLIC BLOOD PRESSURE: 67 MMHG | SYSTOLIC BLOOD PRESSURE: 113 MMHG | OXYGEN SATURATION: 100 % | WEIGHT: 203.06 LBS | TEMPERATURE: 98 F | HEART RATE: 67 BPM | HEIGHT: 61 IN

## 2024-10-23 DIAGNOSIS — B37.2 CANDIDIASIS OF SKIN: ICD-10-CM

## 2024-10-23 DIAGNOSIS — E11.22 TYPE 2 DIABETES MELLITUS WITH STAGE 3A CHRONIC KIDNEY DISEASE, WITH LONG-TERM CURRENT USE OF INSULIN: ICD-10-CM

## 2024-10-23 DIAGNOSIS — E66.01 SEVERE OBESITY (BMI 35.0-39.9) WITH COMORBIDITY: ICD-10-CM

## 2024-10-23 DIAGNOSIS — N18.31 CKD STAGE G3A/A1, GFR 45-59 AND ALBUMIN CREATININE RATIO <30 MG/G: Primary | ICD-10-CM

## 2024-10-23 DIAGNOSIS — I10 PRIMARY HYPERTENSION: ICD-10-CM

## 2024-10-23 DIAGNOSIS — N18.31 TYPE 2 DIABETES MELLITUS WITH STAGE 3A CHRONIC KIDNEY DISEASE, WITH LONG-TERM CURRENT USE OF INSULIN: ICD-10-CM

## 2024-10-23 DIAGNOSIS — Z91.89 AT HIGH RISK FOR HYPERKALEMIA: ICD-10-CM

## 2024-10-23 DIAGNOSIS — Z79.4 TYPE 2 DIABETES MELLITUS WITH STAGE 3A CHRONIC KIDNEY DISEASE, WITH LONG-TERM CURRENT USE OF INSULIN: ICD-10-CM

## 2024-10-23 PROCEDURE — 3008F BODY MASS INDEX DOCD: CPT | Mod: CPTII,,, | Performed by: NURSE PRACTITIONER

## 2024-10-23 PROCEDURE — 4010F ACE/ARB THERAPY RXD/TAKEN: CPT | Mod: CPTII,,, | Performed by: NURSE PRACTITIONER

## 2024-10-23 PROCEDURE — 99214 OFFICE O/P EST MOD 30 MIN: CPT | Mod: S$PBB,,, | Performed by: NURSE PRACTITIONER

## 2024-10-23 PROCEDURE — 1159F MED LIST DOCD IN RCRD: CPT | Mod: CPTII,,, | Performed by: NURSE PRACTITIONER

## 2024-10-23 PROCEDURE — 3078F DIAST BP <80 MM HG: CPT | Mod: CPTII,,, | Performed by: NURSE PRACTITIONER

## 2024-10-23 PROCEDURE — 3044F HG A1C LEVEL LT 7.0%: CPT | Mod: CPTII,,, | Performed by: NURSE PRACTITIONER

## 2024-10-23 PROCEDURE — 3074F SYST BP LT 130 MM HG: CPT | Mod: CPTII,,, | Performed by: NURSE PRACTITIONER

## 2024-10-23 PROCEDURE — 1160F RVW MEDS BY RX/DR IN RCRD: CPT | Mod: CPTII,,, | Performed by: NURSE PRACTITIONER

## 2024-10-23 PROCEDURE — 99214 OFFICE O/P EST MOD 30 MIN: CPT | Mod: PBBFAC | Performed by: NURSE PRACTITIONER

## 2024-10-23 PROCEDURE — 3066F NEPHROPATHY DOC TX: CPT | Mod: CPTII,,, | Performed by: NURSE PRACTITIONER

## 2024-10-23 PROCEDURE — 3060F POS MICROALBUMINURIA REV: CPT | Mod: CPTII,,, | Performed by: NURSE PRACTITIONER

## 2024-10-23 RX ORDER — FLUCONAZOLE 150 MG/1
150 TABLET ORAL
Qty: 4 TABLET | Refills: 0 | Status: SHIPPED | OUTPATIENT
Start: 2024-10-23 | End: 2024-11-22

## 2024-10-23 NOTE — PROGRESS NOTES
[facility]  Nephrology Clinic Note    Chief complaint: Follow-up (RTC, took meds, not taking Lokelma, c/o skin issues at  scar, c/o decreased urination at times)    History of present illness:   Gloria Denis is a 58 y.o. Black or  female with past medical history of hronic kidney disease, persistent hyperkalemia, diabetes mellitus type 2 (diagnosed in her late 30s), hypertension, dyslipidemia, sleep apnea, anxiety, depression, chronic back pain, GERD, chronic constipation and morbid obesity. Patient presents for follow-up appointment in nephrology clinic.  Reports doing well overall, hypertension and hyperglycemia are very well controlled.  Complains of rash to skin fold of lower abdomen.     Review of Systems  12 point review of systems conducted, negative except as stated in the history of present illness.    Allergies: Patient is allergic to aspirin.     Past Medical History:  has a past medical history of Anxiety disorder, unspecified, Chronic constipation, CKD (chronic kidney disease), Depression, DM (diabetes mellitus), Dyslipidemia, GERD (gastroesophageal reflux disease), HTN (hypertension), Irritable bowel syndrome, Morbid obesity, and Sleep apnea, unspecified.    Procedure History:  has a past surgical history that includes  section; Cholecystectomy; Arthroscopic removal of loose body from joint; Drainage of oral abscess; Exploration using laparoscope; Upper gastrointestinal endoscopy; Abscess drainage; and Fracture surgery ().    Family History: family history includes Arthritis in her father and mother; COPD in her sister and sister; Cancer in her brother and mother; Diabetes in her father, sister, sister, sister, and sister; Heart attack in her father; Heart disease in her mother; Hypertension in her father and mother; Kidney disease in her father; Kidney failure in her father; Miscarriages / Stillbirths in her mother; Rheum  "arthritis in her mother; Stroke in her father; Vision loss in her father.    Social History:  reports that she has never smoked. She has never been exposed to tobacco smoke. She has never used smokeless tobacco. She reports that she does not drink alcohol and does not use drugs.    Physical exam  /67 (BP Location: Left arm, Patient Position: Sitting)   Pulse 67   Temp 98 °F (36.7 °C) (Oral)   Resp 18   Ht 5' 0.63" (1.54 m)   Wt 92.1 kg (203 lb 0.7 oz)   SpO2 100%   BMI 38.83 kg/m²   General appearance: Patient is in no acute distress.  Skin:  Macerated skin to folds of the pannus  HEENT: PERRLA, EOMI, no scleral icterus, no JVD. Neck is supple.  Chest: Respirations are unlabored. Lungs sounds are clear.   Heart: S1, S2.   Abdomen: Benign, obese  : Deferred.  Extremities: No edema, peripheral pulses are palpable.   Neuro: No focal deficits.     Home Medications:    Current Outpatient Medications:     atorvastatin (LIPITOR) 40 MG tablet, Take 1 tablet by mouth once daily, Disp: 90 tablet, Rfl: 2    baclofen (LIORESAL) 10 MG tablet, Take 1 tablet (10 mg total) by mouth 2 (two) times daily., Disp: 60 tablet, Rfl: 0    BD CAYLA 2ND GEN PEN NEEDLE 32 gauge x 5/32" Ndle, USE TO INJECT INSULIN EVERY  NIGHT, Disp: 100 each, Rfl: 0    blood sugar diagnostic Strp, To check BG 2 times daily, to use with insurance preferred meter, Disp: 100 each, Rfl: 3    blood-glucose meter kit, To check BG 2 times daily, to use with insurance preferred meter, Disp: 1 each, Rfl: 0    DULoxetine (CYMBALTA) 60 MG capsule, Take 1 capsule (60 mg total) by mouth every evening., Disp: 90 capsule, Rfl: 2    empagliflozin (JARDIANCE) 10 mg tablet, Take 1 tablet (10 mg total) by mouth once daily., Disp: 90 tablet, Rfl: 2    enalapril (VASOTEC) 2.5 MG tablet, Take 1 tablet (2.5 mg total) by mouth once daily., Disp: 90 tablet, Rfl: 2    ergocalciferol (ERGOCALCIFEROL) 50,000 unit Cap, Take 1 capsule (50,000 Units total) by mouth every 7 " days., Disp: 12 capsule, Rfl: 3    hydrOXYzine pamoate (VISTARIL) 25 MG Cap, Take 1 capsule (25 mg total) by mouth 3 (three) times daily as needed (anxiety)., Disp: 90 capsule, Rfl: 1    insulin glargine U-100, Lantus, (LANTUS SOLOSTAR U-100 INSULIN) 100 unit/mL (3 mL) InPn pen, Inject 20 Units into the skin every evening., Disp: 6 mL, Rfl: 2    lancets (ACCU-CHEK SOFTCLIX LANCETS) Misc, USE 1  TO CHECK GLUCOSE TWICE DAILY, Disp: 100 each, Rfl: 6    LINZESS 290 mcg Cap capsule, Take 1 capsule (290 mcg total) by mouth before breakfast., Disp: 90 capsule, Rfl: 2    metoprolol tartrate (LOPRESSOR) 25 MG tablet, Take 1 tablet by mouth twice daily, Disp: 180 tablet, Rfl: 2    ondansetron (ZOFRAN-ODT) 4 MG TbDL, Take 1 tablet (4 mg total) by mouth every 8 (eight) hours as needed (nausea and vomiting)., Disp: 30 tablet, Rfl: 1    sodium zirconium cyclosilicate (LOKELMA) 10 gram packet, Take 1 packet (10 g total) by mouth once daily. MIX 1 PACKET ONCE DAILY AS DIRECTED AND  TAKE  BY  MOUTH (Patient taking differently: Take 10 g by mouth once daily. MIX 1 PACKET ONCE DAILY AS DIRECTED AND  TAKE  BY  MOUTH as needed), Disp: 90 packet, Rfl: 3    tirzepatide 7.5 mg/0.5 mL PnIj, Inject 7.5 mg into the skin every 7 days., Disp: 4 Pen, Rfl: 1    fluconazole (DIFLUCAN) 150 MG Tab, Take 1 tablet (150 mg total) by mouth every 7 days., Disp: 4 tablet, Rfl: 0    omeprazole (PRILOSEC) 40 MG capsule, Take 1 capsule (40 mg total) by mouth once daily. (Patient not taking: Reported on 10/23/2024), Disp: 90 capsule, Rfl: 2    Laboratory data    Lab Results   Component Value Date    WBC 7.08 03/13/2024    HGB 12.2 03/13/2024    HCT 38.2 03/13/2024     03/13/2024     10/21/2024    K 4.7 10/21/2024    CO2 24 10/21/2024    BUN 18.0 10/21/2024    CREATININE 1.18 (H) 10/21/2024    EGFRNORACEVR 54 10/21/2024    GLUCOSE 120 (H) 10/21/2024    CALCIUM 9.2 10/21/2024    ALKPHOS 82 10/21/2024    LABPROT 7.0 10/21/2024    ALBUMIN 3.3 (L)  10/21/2024    BILIDIR 0.2 01/24/2022    IBILI 0.30 01/24/2022    AST 43 (H) 10/21/2024    ALT 44 10/21/2024    PHOS 3.7 05/13/2024      Lab Results   Component Value Date    HGBA1C 6.6 05/13/2024    PTH 59.2 05/13/2024    [id number] 23.1 (L) 03/12/2024    HIV Nonreactive 05/17/2017    HEPCAB Reactive (A) 05/17/2017     Urine:  Lab Results   Component Value Date    APPEARANCEUA Clear 10/21/2024    SGUA 1.015 10/21/2024    PROTEINUA Negative 10/21/2024    KETONESUA Negative 10/21/2024    LEUKOCYTESUR Trace (A) 10/21/2024    RBCUA 0-2 10/21/2024    WBCUA 3-5 10/21/2024    BACTERIA None Seen 10/21/2024    SQEPUA Few (A) 11/03/2023    HYALINECASTS None Seen 11/03/2023    CREATRANDUR 81.4 10/21/2024    PROTEINURINE <6.8 10/21/2024    UPROTCREA 0.1 05/13/2024         Imaging  US Retroperitoneal Limited 05/17/2021  FINDINGS: The bilateral kidneys demonstrate parenchymal thinning.  The right kidney measures 10.5 x 4.1 x 4.3 cm and is otherwise  unremarkable. There is no right hydronephrosis.  The left kidney measures 10.5 x 4.2 x 5.2 cm and is otherwise  unremarkable. There is no left hydronephrosis.    IMPRESSION:  1. Bilateral medical renal disease.  Electronically Signed By: Shai Ga MD  Date/Time Signed: 05/17/2021 14:05      Impression    ICD-10-CM ICD-9-CM   1. CKD stage G3a/A1, GFR 45-59 and albumin creatinine ratio <30 mg/g  N18.31 585.3   2. Candidiasis of skin  B37.2 112.3   3. Primary hypertension  I10 401.9   4. At high risk for hyperkalemia  Z91.89 [id number]   5. Type 2 diabetes mellitus with stage 3a chronic kidney disease, with long-term current use of insulin  E11.22 250.40    N18.31 585.3    Z79.4 V58.67   6. Severe obesity (BMI 35.0-39.9) with comorbidity  E66.01 278.01        Plan  CKD stage G3a/A1, GFR 45-59 and albumin creatinine ratio <30 mg/g  -     Comprehensive Metabolic Panel; Future; Expected date: 04/13/2025  -     Protein/Creatinine Ratio, Urine; Future; Expected date: 04/13/2025  -      Urinalysis, Reflex to Urine Culture; Future; Expected date: 04/13/2025  Possibly diabetic kidney disease.  Serum creatinine is stable, there is no significant proteinuria, imaging of the kidneys was essentially unremarkable. Continue risk factor management. Continue enalapril with close monitoring of serum potassium.  Continue SGLT2 inhibitor therapy.  Continue renal sparing activities:    -Follow low sodium diet (2 grams a day)  -Control diabetes (goal A1C <7.0%)  -Control high blood pressure ( goal BP < 130/80, please record BP at home every day and bring log to next office visit)  -Exercise at least 30 minutes a day, 5 days a week.  -Maintain healthy weight.  -Decrease or stop alcohol use  -Do not smoke  -Stay well hydrated  -Receive Pneumovax, Flu, and HBV vaccines if indicated.  -Do not take NSAIDs (Ibuprofen, Naproxen, Aleve, Advil, Toradol, Mobic), may take only Tylenol as needed for pain/headaches.  -Take cholesterol-lowering medications as prescribed (LDL goal <100)    Candidiasis of skin  -     Ambulatory referral/consult to Wound Clinic; Future; Expected date: 10/30/2024  -     fluconazole (DIFLUCAN) 150 MG Tab; Take 1 tablet (150 mg total) by mouth every 7 days.  Dispense: 4 tablet; Refill: 0  Will treat with fluconazole and refer to wound care for further management.    Primary hypertension  Blood pressure reading is at goal, continue current antihypertensive regimen.      At high risk for hyperkalemia  Continue Lokelma as ordered.      Type 2 diabetes mellitus with stage 3a chronic kidney disease, with long-term current use of insulin  A1c is at goal, continue ACE inhibitor and SGLT2 inhibitor therapy.    Severe obesity (BMI 35.0-39.9) with comorbidity  Lifestyle and dietary interventions discussed, patient encouraged to maintain non-sedentary lifestyle and well-balanced diet.         Follow up in about 6 months (around 4/23/2025).       Charlene Pineda NP  [facility] Nephrology

## 2024-11-15 ENCOUNTER — LAB VISIT (OUTPATIENT)
Dept: LAB | Facility: HOSPITAL | Age: 59
End: 2024-11-15
Payer: MEDICARE

## 2024-11-15 DIAGNOSIS — E55.9 VITAMIN D DEFICIENCY: ICD-10-CM

## 2024-11-15 DIAGNOSIS — Z79.4 TYPE 2 DIABETES MELLITUS WITH STAGE 3A CHRONIC KIDNEY DISEASE, WITH LONG-TERM CURRENT USE OF INSULIN: Chronic | ICD-10-CM

## 2024-11-15 DIAGNOSIS — E11.22 TYPE 2 DIABETES MELLITUS WITH STAGE 3A CHRONIC KIDNEY DISEASE, WITH LONG-TERM CURRENT USE OF INSULIN: Chronic | ICD-10-CM

## 2024-11-15 DIAGNOSIS — N18.31 TYPE 2 DIABETES MELLITUS WITH STAGE 3A CHRONIC KIDNEY DISEASE, WITH LONG-TERM CURRENT USE OF INSULIN: Chronic | ICD-10-CM

## 2024-11-15 LAB
25(OH)D3+25(OH)D2 SERPL-MCNC: 35 NG/ML (ref 30–80)
ANION GAP SERPL CALC-SCNC: 7 MEQ/L
BUN SERPL-MCNC: 26 MG/DL (ref 9.8–20.1)
CALCIUM SERPL-MCNC: 8.8 MG/DL (ref 8.4–10.2)
CHLORIDE SERPL-SCNC: 107 MMOL/L (ref 98–107)
CO2 SERPL-SCNC: 24 MMOL/L (ref 22–29)
CREAT SERPL-MCNC: 1.3 MG/DL (ref 0.55–1.02)
CREAT/UREA NIT SERPL: 20
EST. AVERAGE GLUCOSE BLD GHB EST-MCNC: 131.2 MG/DL
GFR SERPLBLD CREATININE-BSD FMLA CKD-EPI: 48 ML/MIN/1.73/M2
GLUCOSE SERPL-MCNC: 109 MG/DL (ref 74–100)
HBA1C MFR BLD: 6.2 %
POTASSIUM SERPL-SCNC: 4.2 MMOL/L (ref 3.5–5.1)
SODIUM SERPL-SCNC: 138 MMOL/L (ref 136–145)

## 2024-11-15 PROCEDURE — 83036 HEMOGLOBIN GLYCOSYLATED A1C: CPT

## 2024-11-15 PROCEDURE — 82306 VITAMIN D 25 HYDROXY: CPT

## 2024-11-15 PROCEDURE — 36415 COLL VENOUS BLD VENIPUNCTURE: CPT

## 2024-11-15 PROCEDURE — 80048 BASIC METABOLIC PNL TOTAL CA: CPT

## 2024-11-26 ENCOUNTER — HOSPITAL ENCOUNTER (OUTPATIENT)
Dept: RADIOLOGY | Facility: HOSPITAL | Age: 59
Discharge: HOME | End: 2024-11-26
Payer: MEDICARE

## 2024-11-26 DIAGNOSIS — Z12.31 BREAST CANCER SCREENING BY MAMMOGRAM: ICD-10-CM

## 2024-11-26 PROCEDURE — 77067 SCR MAMMO BI INCL CAD: CPT | Mod: TC

## 2024-12-04 ENCOUNTER — PATIENT MESSAGE (OUTPATIENT)
Dept: INTERNAL MEDICINE | Facility: CLINIC | Age: 59
End: 2024-12-04
Payer: MEDICARE

## 2024-12-12 ENCOUNTER — PATIENT MESSAGE (OUTPATIENT)
Dept: INTERNAL MEDICINE | Facility: CLINIC | Age: 59
End: 2024-12-12
Payer: MEDICARE

## 2024-12-18 ENCOUNTER — PATIENT MESSAGE (OUTPATIENT)
Dept: INTERNAL MEDICINE | Facility: CLINIC | Age: 59
End: 2024-12-18
Payer: MEDICARE

## 2024-12-18 DIAGNOSIS — N18.31 TYPE 2 DIABETES MELLITUS WITH STAGE 3A CHRONIC KIDNEY DISEASE, WITH LONG-TERM CURRENT USE OF INSULIN: Chronic | ICD-10-CM

## 2024-12-18 DIAGNOSIS — Z79.4 TYPE 2 DIABETES MELLITUS WITH STAGE 3A CHRONIC KIDNEY DISEASE, WITH LONG-TERM CURRENT USE OF INSULIN: Chronic | ICD-10-CM

## 2024-12-18 DIAGNOSIS — E11.22 TYPE 2 DIABETES MELLITUS WITH STAGE 3A CHRONIC KIDNEY DISEASE, WITH LONG-TERM CURRENT USE OF INSULIN: Chronic | ICD-10-CM

## 2024-12-18 DIAGNOSIS — R11.2 NAUSEA AND VOMITING, UNSPECIFIED VOMITING TYPE: Primary | ICD-10-CM

## 2024-12-18 RX ORDER — INSULIN GLARGINE 100 [IU]/ML
20 INJECTION, SOLUTION SUBCUTANEOUS NIGHTLY
Qty: 6 ML | Refills: 2 | Status: SHIPPED | OUTPATIENT
Start: 2024-12-18

## 2024-12-18 RX ORDER — ONDANSETRON 4 MG/1
4 TABLET, ORALLY DISINTEGRATING ORAL EVERY 8 HOURS PRN
Qty: 30 TABLET | Refills: 1 | Status: SHIPPED | OUTPATIENT
Start: 2024-12-18

## 2025-01-09 NOTE — PROGRESS NOTES
This is a telemedicine note. Patient was treated using telemedicine, real time audio and video, according to [facility] protocols. Maggie THORNTON PA-C, conducted the visit from the [facility]. The patient participated in the visit at a non-[facility] location selected by the patient, identified below. I am licensed in the state where the patient stated they are located. The patient stated that they understood and accepted the privacy and security risks to their information at their location. This visit is not recorded.    Patient was located at the patient's home.       CHIEF COMPLAINT:   No chief complaint on file.                                                 HPI:  Gloria Denis 59 y.o. female  with a past medical history of hypertension, hyperlipidemia, sleep apnea, diabetes mellitus, depression, anxiety, and chronic back pain who presents for follow up and ongoing care.  Patient completed an echocardiogram on June 30, 2022 which revealed an ejection fraction of 60% with grade 1 diastolic dysfunction.  See report below.  She completed AMBER testing on July 15, 2022 which revealed no significant arterial flow reduction in the bilateral lower extremities.  See report below.   She completed a Lexiscan stress test in July 2020 which revealed no ischemia. She also completed AMBER testing in June 2020 which revealed no evidence of significant arterial insufficiency.  At last office visit patient stated that she felt fairly well, however she was endorsing increased fatigue and CROUCH.  Since then, she has completed a nuclear stress test.  See full report below.    Today the patient states that she feels stable from her last office visit.  She continues to complain of SOB/CROUCH and ongoing fatigue.  She states that it is not necessarily progressed from her last office visit, but it is still present in her daily life.  She did not complete echocardiogram that was ordered at her last visit.  She has  occasional lightheadedness and dizziness, but states that it is stable.  She denies any other cardiac complaints such as chest pain, palpitations, PND, orthopnea, lower extremity edema, or claudication symptoms.  She continues to have back pain and shooting pain into legs which can limit her mobility at times.  She states that she is able to complete her ADLs without any issues.  She does not do much formal exercise.  Encouraged more purposeful physical activity.  She tries to follow a heart healthy diet.  She reports compliance with all her current medications and states that she is tolerating them well.  She is inconsistent with BiPAP.  Strongly encouraged nightly BiPAP use.  She denies any tobacco or other illicit drug use.                                                                                                                                                                                                                                                                                                                                                                                                                                                                   CARDIAC TESTING:  Nuclear Stress Test 10.1.24    Normal myocardial perfusion scan. There is no evidence of myocardial ischemia or infarction.    The ECG portion of the study is abnormal but not diagnostic.    AMBER Testing 7.15.22:                                                                                                       The right lower extremity demonstrated multiphasic waveforms at all levels.   The AMBER on the right was normal at 1.15.  The TBI on the right was normal at 0.77.  The right lower extremity demonstrated no significant arterial flow reduction.     The left lower extremity demonstrated multiphasic waveforms at all levels.   The AMBER on the left was invalid due to non-compressible vessels at the ankle.  The TBI on the left was  normal at 0.80.  The left lower extremity demonstrated no significant arterial flow reduction.     Echo 6.30.22  · The left ventricle is normal in size with concentric remodeling and normal systolic function.  · The estimated ejection fraction is 60%.  · Grade I left ventricular diastolic dysfunction.  · Normal right ventricular size with normal right ventricular systolic function.  · Normal central venous pressure (3 mmHg).  · The estimated PA systolic pressure is 23 mmHg.  · IVC is normal.  · There is no pulmonary hypertension.     Lexiscan stress test July 2020:  Scan is consistent with: No ischemia  No scar  Attenuation artifact in the inferolateral wall  Ejection fraction: 90%.    AMBER testing June 2020:  No evidence of significant arterial insufficiency was identified in the study.    Echocardiogram June 2019:  Left ventricular ejection fraction is measured at approximately 55-60%.  Normal right ventricular size with preserved RV function.  No significant valvular abnormalities.  There is trace tricuspid regurgitation with estimated RVSP of 24 mmHg.  No evidence of pericardial effusion.             Patient Active Problem List   Diagnosis    Mixed anxiety depressive disorder    Chronic back pain    Diabetic neuropathy    Hyperlipidemia LDL goal <70    Primary hypertension    Sleep apnea    Sciatica    Chronic constipation    Gastroesophageal reflux disease    Screening for colon cancer    CKD stage G3b/A2, GFR 30-44 and albumin creatinine ratio  mg/g    Chronic bilateral low back pain with bilateral sciatica    BMI 40.0-44.9, adult    CROUCH (dyspnea on exertion)    Cellulitis    Type 2 diabetes mellitus with stage 3a chronic kidney disease, with long-term current use of insulin    JORGE ALBERTO (obstructive sleep apnea)    Polyneuropathy associated with underlying disease    NPDR (nonproliferative diabetic retinopathy)    DDD (degenerative disc disease), lumbar    Central stenosis of spinal canal    Class 3 severe  obesity due to excess calories with serious comorbidity and body mass index (BMI) of 40.0 to 44.9 in adult    Chronic left shoulder pain    Paronychia of second toe    Rash    Acute cystitis without hematuria    Fatigue due to excessive exertion     Past Surgical History:   Procedure Laterality Date    ABSCESS DRAINAGE      I don't remember the date    ARTHROSCOPIC REMOVAL OF LOOSE BODY FROM JOINT       SECTION      CHOLECYSTECTOMY      Drainage of oral abscess      Exploration using laparoscope      FRACTURE SURGERY  1985    UPPER GASTROINTESTINAL ENDOSCOPY       Social History     Socioeconomic History    Marital status:      Spouse name: Kp   Tobacco Use    Smoking status: Never     Passive exposure: Never    Smokeless tobacco: Never   Substance and Sexual Activity    Alcohol use: Never    Drug use: Never    Sexual activity: Not Currently     Partners: Male     Birth control/protection: None     Social Drivers of Health     Financial Resource Strain: Low Risk  (2/15/2024)    Overall Financial Resource Strain (CARDIA)     Difficulty of Paying Living Expenses: Not hard at all   Recent Concern: Financial Resource Strain - Medium Risk (2023)    Overall Financial Resource Strain (CARDIA)     Difficulty of Paying Living Expenses: Somewhat hard   Food Insecurity: No Food Insecurity (2/15/2024)    Hunger Vital Sign     Worried About Running Out of Food in the Last Year: Never true     Ran Out of Food in the Last Year: Never true   Transportation Needs: No Transportation Needs (2/15/2024)    PRAPARE - Transportation     Lack of Transportation (Medical): No     Lack of Transportation (Non-Medical): No   Physical Activity: Inactive (2/15/2024)    Exercise Vital Sign     Days of Exercise per Week: 0 days     Minutes of Exercise per Session: 0 min   Stress: No Stress Concern Present (2/15/2024)    [employer] - Occupational Stress Questionnaire     Feeling of Stress : Not at  all   Housing Stability: Low Risk  (2/15/2024)    Housing Stability Vital Sign     Unable to Pay for Housing in the Last Year: No     Number of Places Lived in the Last Year: 1     Unstable Housing in the Last Year: No        Family History   Problem Relation Name Age of Onset    Cancer Mother Patrica ROSS Cadena     Heart disease Mother Patrica Laneblanc     Rheum arthritis Mother Patrica ROSS Cadena     Arthritis Mother Patrica ROSS Cadena     Hypertension Mother Patrica Laneblanc     Miscarriages / Stillbirths Mother Patrica Laneblanc     Diabetes Father Devon Cadena     Heart attack Father Devon Cadena     Kidney failure Father Devon Cadena     Stroke Father Devon Cadena     Arthritis Father Devon Cadena     Hypertension Father Devon Cadena     Kidney disease Father Devon Cadena     Vision loss Father Devon Cadena     Cancer Brother Jalil Cadena     COPD Sister Tanya Cadena     Diabetes Sister Tanya Cadena     COPD Sister Staci Cadena Dominique     Diabetes Sister Staci Cadena Dominique     Diabetes Sister Kae Cadena Laws     Diabetes Sister Maryloujose Callie Miguel      Review of patient's allergies indicates:   Allergen Reactions    Aspirin          ROS:  Review of Systems   Constitutional:  Positive for malaise/fatigue.   HENT: Negative.     Eyes: Negative.    Respiratory:  Positive for shortness of breath.    Cardiovascular:  Positive for palpitations (Intermittent). Negative for chest pain, orthopnea, claudication, leg swelling and PND.        CROUCH   Gastrointestinal: Negative.    Genitourinary: Negative.    Musculoskeletal: Negative.    Skin: Negative.    Neurological: Negative.  Negative for weakness.   Endo/Heme/Allergies: Negative.    Psychiatric/Behavioral: Negative.                                                                                                                                                                                  Negative except as stated in  "the history of present illness. See HPI for details.    PHYSICAL EXAM:  There were no vitals taken for this visit.    Physical Exam  Constitutional:       Appearance: Normal appearance.   HENT:      Head: Normocephalic.      Nose: Nose normal.   Eyes:      Extraocular Movements: Extraocular movements intact.   Pulmonary:      Effort: Pulmonary effort is normal.   Musculoskeletal:         General: Normal range of motion.   Skin:     General: Skin is warm.   Neurological:      General: No focal deficit present.      Mental Status: She is alert.   Psychiatric:         Mood and Affect: Mood normal.     Physical Exam: LIMITED DUE TO TELEMEDICINE RESTRICTIONS.  General: Alert and oriented, No acute distress.  Head: Normocephalic, Atraumatic.  Eye: Sclera non-icteric.  Neck/Thyroid:  Full range of motion.  Respiratory: Non-labored respirations, Symmetrical chest wall expansion.  Musculoskeletal: Normal range of motion.  Integumentary:  No visible suspicious lesions or rashes. No diaphoresis.   Neurologic: No focal deficits  Psychiatric: Normal interaction, Coherent speech, Euthymic mood, Appropriate affect     Current Outpatient Medications   Medication Instructions    atorvastatin (LIPITOR) 40 mg, Oral    baclofen (LIORESAL) 10 mg, Oral, 2 times daily    BD CAYLA 2ND GEN PEN NEEDLE 32 gauge x 5/32" Ndle USE TO INJECT INSULIN EVERY  NIGHT    blood sugar diagnostic Strp To check BG 2 times daily, to use with insurance preferred meter    blood-glucose meter kit To check BG 2 times daily, to use with insurance preferred meter    DULoxetine (CYMBALTA) 60 mg, Oral, Nightly    empagliflozin (JARDIANCE) 10 mg, Oral, Daily    enalapril (VASOTEC) 2.5 mg, Oral, Daily    ergocalciferol (ERGOCALCIFEROL) 50,000 Units, Oral, Every 7 days    hydrOXYzine pamoate (VISTARIL) 25 mg, Oral, 3 times daily PRN    lancets (ACCU-CHEK SOFTCLIX LANCETS) Misc USE 1  TO CHECK GLUCOSE TWICE DAILY    LANTUS SOLOSTAR U-100 INSULIN 20 Units, Subcutaneous, " Nightly    LINZESS 290 mcg, Oral, Before breakfast    metoprolol tartrate (LOPRESSOR) 25 mg, Oral, 2 times daily    omeprazole (PRILOSEC) 40 mg, Oral, Daily    ondansetron (ZOFRAN-ODT) 4 mg, Oral, Every 8 hours PRN    sodium zirconium cyclosilicate (LOKELMA) 10 g, Oral, Daily, MIX 1 PACKET ONCE DAILY AS DIRECTED AND  TAKE  BY  MOUTH    tirzepatide 10 mg, Subcutaneous, Every 7 days        All medications, laboratory studies, cardiac diagnostic imaging reviewed.     Lab Results   Component Value Date    LDL 66.00 05/13/2024    .00 02/07/2024    TRIG 103 05/13/2024    TRIG 195 (H) 02/07/2024    CREATININE 1.30 (H) 11/15/2024    K 4.2 11/15/2024        ASSESSMENT/PLAN:    Lightheadedness/Dizziness  - Reports ongoing  episodes of dizziness and lightheadedness since her last visit.    - Not limiting or interfering in ADLs  - Denies any syncopal events  - Repeat Echo      Chest Pain - resolved  CROUCH - worsening  Fatigue  - Stable from last office visit   - Reports CROUCH with mild to moderate intensity activities, occasionally with ADLs.  She states that it has slightly worsened since last office visit.   - She also endorses more lightheadedness, dizziness, and fatigue  - Denies chest pain   - Will have patient complete echocardiogram to assess for any underlying heart failure, valvular disease, structural disease that might be contributing to worsening symptoms as listed above - still pending, reminded patient to complete  - Lexiscan Stress Test October 2024- no ischemia or infarction, low risk   - EF 60% per Echo June 2022  - EF 55-60% per Echo June 2019  - Lexiscan Stress test July 2020 - no ischemia  - ED precautions     Hypertension  - /77  - Continue Enalapril and Metoprolol tartrate  - Counseled on low salt diet and increased activity as tolerated     Hyperlipidemia   - LDL 66 per labs May 2024  - Continue Atorvastatin 40mg daily for now  - Strongly encouraged a low cholesterol, low fat diet and increased  "activity as tolerated     Sleep apnea on BiPAP   - Recommend nightly BiPAP use   - Reports intermittent use currently     Diabetes mellitus  - Management per PCP    Depression/Anxiety  - Management per PCP    Chronic back pain  - Continue management per PCP    Bilateral lower extremity pain  - AMBER testing July 2022:  no significant arterial flow reduction in the bilateral lower extremities  - States that her pain is mostly due to her chronic sciatic nerve pain- described as a "shooting pain from my back"  - Reports varicose veins   - Advised patient to notify clinic of any new concerns or change in symptoms         Complete echocardiogram that was ordered at last office visit   Follow up in cardiology clinic in (in person) 4 months or sooner if needed   Follow up with PCP as directed   Please notify clinic if any new concerns or any change in symptoms    No follow-ups on file. In addition to their scheduled follow up, the patient has also been instructed to follow up on as needed basis.     Future Appointments   Date Time Provider Department Center   1/10/2025 11:15 AM Maggie Hollis PA-C [facility] CARD [place]    1/17/2025  7:20 AM Lindsey Carbajal [facility] INTMED [place]    4/23/2025  9:30 AM Charlene Pineda IVANNA [facility] NEPHR [place]         Video Time Documentation:  Spent 10 minutes with patient face to face discussed health concerns. More than 50% of this time was spent in counseling and coordination of care.    Maggie Hollis PA-C      "

## 2025-01-10 ENCOUNTER — OFFICE VISIT (OUTPATIENT)
Dept: CARDIOLOGY | Facility: CLINIC | Age: 60
End: 2025-01-10
Payer: MEDICARE

## 2025-01-10 VITALS
HEART RATE: 83 BPM | RESPIRATION RATE: 18 BRPM | DIASTOLIC BLOOD PRESSURE: 77 MMHG | OXYGEN SATURATION: 98 % | SYSTOLIC BLOOD PRESSURE: 115 MMHG

## 2025-01-10 DIAGNOSIS — R06.09 DOE (DYSPNEA ON EXERTION): ICD-10-CM

## 2025-01-10 DIAGNOSIS — I10 PRIMARY HYPERTENSION: Primary | Chronic | ICD-10-CM

## 2025-01-10 DIAGNOSIS — E78.5 HYPERLIPIDEMIA LDL GOAL <70: Chronic | ICD-10-CM

## 2025-01-10 DIAGNOSIS — G47.33 OSA (OBSTRUCTIVE SLEEP APNEA): Chronic | ICD-10-CM

## 2025-01-10 NOTE — PATIENT INSTRUCTIONS
Complete echocardiogram that was ordered at last office visit   Follow up in cardiology clinic in (in person) 4 months or sooner if needed   Follow up with PCP as directed   Please notify clinic if any new concerns or any change in symptoms

## 2025-01-16 NOTE — PROGRESS NOTES
Lindsey Carbajal, ALYX   [facility]  [address] [facility] [address]      PATIENT NAME: Gloria Denis  : 1965  DATE: 25  MRN: [id number]      Reason for Visit / Chief Complaint: Diabetes (Takes medications as prescribed )       History of Present Illness / Problem Focused Workflow     Gloria Denis presents to the clinic with Diabetes (Takes medications as prescribed )     58 y.o.  female presents to the clinic.  Medical problems include HTN, dyslipidemia, JORGE ALBERTO on Bipap, DM, DM neuropathy, depression, anxiety, CKD, hyperkalemia, L NPDR, GERD, chronic constipation, morbid obesity, atrophic vaginitis, Yolanda-Yolanda dz, and chronic back pain. Followed by [facility] cardio, GYN, renal and GI clinics. Followed by Dr Hughes in [place] annually, declines screening photos in clinic.     10/8/24  Pt presents for DM follow up. A1c and BMP ordered, but not completed. POC A1C today 6.1, she is reporting compliance with medications, renal d/c metformin. Less appetite suppression with mounjaro over the last month. [place] goal agreed to reduce insulin dose and titrate mounjaro up with goal of improved insulin resistance and weight loss. Will send mounjaro 7.5, pt to reduce lantus to 15U QHS once she starts new dose. She is compliant with CBG checks, FBG around 120 most days. Agreeable to DM foot exam. Mammogram ordered for next month. RTC in 2 months for DM follow up and continue med titration.     25  Pt presents virtually for DM medication management. She is now taking mounjaro 10mg dose and lantus 15U QHS. She has tolerated increased dose well. FBG in the low hundreds most days other than when she is drinking juice in the night time, discouraged her from doing so. Instructed her if CBGs are <110 consistently she can reduce lantus to 12 units. Report any lows to clinic. She feels the appetite suppression  "is not as strong as when she first started the medication. She is complaining of heel pain for the last few days, encouraged her to roll foot on frozen water bottle or tennis ball for plantar fascitis, contact clinic if no improvement. Denies acute complaints. RTC 3 months with labs prior for DM, HTN, HLD follow up.           Review of Systems     Review of Systems   Constitutional:  Negative for fatigue, fever and unexpected weight change.   HENT:  Negative for ear pain, hearing loss, trouble swallowing and voice change.    Respiratory:  Negative for cough and shortness of breath.    Cardiovascular:  Negative for chest pain and palpitations.   Gastrointestinal:  Negative for abdominal pain, diarrhea and vomiting.   Genitourinary:  Negative for dysuria.   Musculoskeletal:  Negative for gait problem.        Heel pain   Skin:  Negative for rash and wound.   Neurological:  Negative for weakness.   Psychiatric/Behavioral:  Negative for suicidal ideas.          Medications and Allergies     Medications  Medication List with Changes/Refills   Current Medications    ATORVASTATIN (LIPITOR) 40 MG TABLET    Take 1 tablet by mouth once daily    BACLOFEN (LIORESAL) 10 MG TABLET    Take 1 tablet (10 mg total) by mouth 2 (two) times daily.    BD CAYLA 2ND GEN PEN NEEDLE 32 GAUGE X 5/32" NDLE    USE TO INJECT INSULIN EVERY  NIGHT    BLOOD SUGAR DIAGNOSTIC STRP    To check BG 2 times daily, to use with insurance preferred meter    BLOOD-GLUCOSE METER KIT    To check BG 2 times daily, to use with insurance preferred meter    DULOXETINE (CYMBALTA) 60 MG CAPSULE    Take 1 capsule (60 mg total) by mouth every evening.    EMPAGLIFLOZIN (JARDIANCE) 10 MG TABLET    Take 1 tablet (10 mg total) by mouth once daily.    ENALAPRIL (VASOTEC) 2.5 MG TABLET    Take 1 tablet (2.5 mg total) by mouth once daily.    ERGOCALCIFEROL (ERGOCALCIFEROL) 50,000 UNIT CAP    Take 1 capsule (50,000 Units total) by mouth every 7 days.    HYDROXYZINE PAMOATE " (VISTARIL) 25 MG CAP    Take 1 capsule (25 mg total) by mouth 3 (three) times daily as needed (anxiety).    INSULIN GLARGINE U-100, LANTUS, (LANTUS SOLOSTAR U-100 INSULIN) 100 UNIT/ML (3 ML) INPN PEN    Inject 20 Units into the skin every evening.    LANCETS (ACCU-CHEK SOFTCLIX LANCETS) MISC    USE 1  TO CHECK GLUCOSE TWICE DAILY    LINZESS 290 MCG CAP CAPSULE    Take 1 capsule (290 mcg total) by mouth before breakfast.    METOPROLOL TARTRATE (LOPRESSOR) 25 MG TABLET    Take 1 tablet by mouth twice daily    OMEPRAZOLE (PRILOSEC) 40 MG CAPSULE    Take 1 capsule (40 mg total) by mouth once daily.    ONDANSETRON (ZOFRAN-ODT) 4 MG TBDL    Take 1 tablet (4 mg total) by mouth every 8 (eight) hours as needed (nausea and vomiting).    SODIUM ZIRCONIUM CYCLOSILICATE (LOKELMA) 10 GRAM PACKET    Take 1 packet (10 g total) by mouth once daily. MIX 1 PACKET ONCE DAILY AS DIRECTED AND  TAKE  BY  MOUTH   Changed and/or Refilled Medications    Modified Medication Previous Medication    TIRZEPATIDE 10 MG/0.5 ML PNIJ tirzepatide 10 mg/0.5 mL PnIj       Inject 10 mg into the skin every 7 days.    Inject 10 mg into the skin every 7 days.         Allergies  Review of patient's allergies indicates:   Allergen Reactions    Aspirin        Physical Examination   There were no vitals filed for this visit.  Physical Exam  HENT:      Right Ear: Hearing normal.      Left Ear: Hearing normal.   Neurological:      Mental Status: She is alert and oriented to person, place, and time.   Psychiatric:         Mood and Affect: Mood normal.           Results     Lab Results   Component Value Date    WBC 7.08 03/13/2024    RBC 4.41 03/13/2024    HGB 12.2 03/13/2024    HCT 38.2 03/13/2024    MCV 86.6 03/13/2024    MCH 27.7 03/13/2024    MCHC 31.9 (L) 03/13/2024    RDW 13.5 03/13/2024     03/13/2024    MPV 9.6 03/13/2024     Sodium   Date Value Ref Range Status   11/15/2024 138 136 - 145 mmol/L Final     Potassium   Date Value Ref Range Status    11/15/2024 4.2 3.5 - 5.1 mmol/L Final     Chloride   Date Value Ref Range Status   11/15/2024 107 98 - 107 mmol/L Final     CO2   Date Value Ref Range Status   11/15/2024 24 22 - 29 mmol/L Final     Blood Urea Nitrogen   Date Value Ref Range Status   11/15/2024 26.0 (H) 9.8 - 20.1 mg/dL Final     Creatinine   Date Value Ref Range Status   11/15/2024 1.30 (H) 0.55 - 1.02 mg/dL Final     Calcium   Date Value Ref Range Status   11/15/2024 8.8 8.4 - 10.2 mg/dL Final     Albumin   Date Value Ref Range Status   10/21/2024 3.3 (L) 3.5 - 5.0 g/dL Final     Bilirubin Total   Date Value Ref Range Status   10/21/2024 0.4 <=1.5 mg/dL Final     ALP   Date Value Ref Range Status   10/21/2024 82 40 - 150 unit/L Final     AST   Date Value Ref Range Status   10/21/2024 43 (H) 5 - 34 unit/L Final     ALT   Date Value Ref Range Status   10/21/2024 44 0 - 55 unit/L Final     Estimated GFR-Non    Date Value Ref Range Status   02/21/2022 49 >=90      Lab Results   Component Value Date    CHOL 119 05/13/2024     Lab Results   Component Value Date    HDL 32 (L) 05/13/2024     Lab Results   Component Value Date    TRIG 103 05/13/2024     Lab Results   Component Value Date    VLDL 21 05/13/2024     Lab Results   Component Value Date    LDL 66.00 05/13/2024     Lab Results   Component Value Date    TSH 1.332 02/07/2024     Lab Results   Component Value Date    PHUR 5.5 10/21/2024    PROTEINUA Negative 10/21/2024    GLUCUA 2+ (A) 10/21/2024    KETONESU Negative 02/21/2022    OCCULTUA Negative 10/21/2024    NITRITE Negative 10/21/2024    LEUKOCYTESUR Trace (A) 10/21/2024     Lab Results   Component Value Date    HGBA1C 6.2 11/15/2024    HGBA1C 6.6 05/13/2024    HGBA1C 8.2 (H) 02/07/2024           Assessment         ICD-10-CM ICD-9-CM   1. Type 2 diabetes mellitus with stage 3a chronic kidney disease, with long-term current use of insulin  E11.22 250.40    N18.31 585.3    Z79.4 V58.67   2. Primary hypertension  I10 401.9        Plan      Problem List Items Addressed This Visit       Primary hypertension (Chronic)    Relevant Orders    CBC Auto Differential    TSH    T4, Free    Type 2 diabetes mellitus with stage 3a chronic kidney disease, with long-term current use of insulin - Primary (Chronic)    Current Assessment & Plan     A1C at goal 6.1  Metformin d/alfonso, continue other meds  Continue Mounjaro 10mg and Lantus to 15U at that time and monitor CBGs. May reduce lantus to 12U if FBG <110 consistently  Follow ADA Diet. Avoid soda, simple sweets, and limit rice/pasta/breads/starches (no more than 45-50 grams per meal).  Maintain healthy weight with goal BMI <30.  Exercise 5 times per week for 30 minutes per day.  Stressed importance of daily foot exams.  Stressed importance of annual dilated eye exam.         Relevant Medications    tirzepatide 10 mg/0.5 mL PnIj    Other Relevant Orders    Comprehensive Metabolic Panel    Hemoglobin A1C    Lipid Panel    Urinalysis, Reflex to Urine Culture       Future Appointments   Date Time Provider Department Center   4/23/2025  9:30 AM Charlene Pineda FNP [facility]    5/20/2025 10:00 AM Acacia Sin MD [facility]         The patient location is: home  The chief complaint leading to consultation is: DM    Visit type: audiovisual    Face to Face time with patient: 15  5 minutes of total time spent on the encounter, which includes face to face time and non-face to face time preparing to see the patient (eg, review of tests), Obtaining and/or reviewing separately obtained history, Documenting clinical information in the electronic or other health record, Independently interpreting results (not separately reported) and communicating results to the patient/family/caregiver, or Care coordination (not separately reported).         Each patient to whom he or she provides medical services by telemedicine is:  (1) informed of the relationship between the physician and patient  and the respective role of any other health care provider with respect to management of the patient; and (2) notified that he or she may decline to receive medical services by telemedicine and may withdraw from such care at any time.    Notes:       Signature:      [facility]  [address] [facility] [address]    Date of encounter: 1/17/25

## 2025-01-17 ENCOUNTER — OFFICE VISIT (OUTPATIENT)
Dept: INTERNAL MEDICINE | Facility: CLINIC | Age: 60
End: 2025-01-17
Payer: MEDICARE

## 2025-01-17 DIAGNOSIS — N18.31 TYPE 2 DIABETES MELLITUS WITH STAGE 3A CHRONIC KIDNEY DISEASE, WITH LONG-TERM CURRENT USE OF INSULIN: Primary | Chronic | ICD-10-CM

## 2025-01-17 DIAGNOSIS — E11.22 TYPE 2 DIABETES MELLITUS WITH STAGE 3A CHRONIC KIDNEY DISEASE, WITH LONG-TERM CURRENT USE OF INSULIN: Primary | Chronic | ICD-10-CM

## 2025-01-17 DIAGNOSIS — Z79.4 TYPE 2 DIABETES MELLITUS WITH STAGE 3A CHRONIC KIDNEY DISEASE, WITH LONG-TERM CURRENT USE OF INSULIN: Primary | Chronic | ICD-10-CM

## 2025-01-17 DIAGNOSIS — I10 PRIMARY HYPERTENSION: Chronic | ICD-10-CM

## 2025-01-17 NOTE — ASSESSMENT & PLAN NOTE
A1C at goal 6.1  Metformin d/alfonso, continue other meds  Continue Mounjaro 10mg and Lantus to 15U at that time and monitor CBGs. May reduce lantus to 12U if FBG <110 consistently  Follow ADA Diet. Avoid soda, simple sweets, and limit rice/pasta/breads/starches (no more than 45-50 grams per meal).  Maintain healthy weight with goal BMI <30.  Exercise 5 times per week for 30 minutes per day.  Stressed importance of daily foot exams.  Stressed importance of annual dilated eye exam.

## 2025-02-20 DIAGNOSIS — F41.8 MIXED ANXIETY DEPRESSIVE DISORDER: Chronic | ICD-10-CM

## 2025-02-20 RX ORDER — TRIAMCINOLONE ACETONIDE 1 MG/G
CREAM TOPICAL 2 TIMES DAILY
COMMUNITY
Start: 2025-02-05

## 2025-02-20 RX ORDER — AMOXICILLIN 500 MG/1
500 CAPSULE ORAL EVERY 8 HOURS
COMMUNITY
Start: 2024-10-31

## 2025-02-20 RX ORDER — NYSTATIN 100000 U/G
OINTMENT TOPICAL 2 TIMES DAILY
COMMUNITY
Start: 2025-01-02

## 2025-02-20 RX ORDER — DOXYCYCLINE 100 MG/1
100 CAPSULE ORAL 2 TIMES DAILY
COMMUNITY
Start: 2024-12-19

## 2025-02-20 RX ORDER — SILVER SULFADIAZINE 10 G/1000G
CREAM TOPICAL 2 TIMES DAILY
COMMUNITY
Start: 2024-12-19

## 2025-02-21 RX ORDER — HYDROXYZINE PAMOATE 25 MG/1
25 CAPSULE ORAL 3 TIMES DAILY PRN
Qty: 90 CAPSULE | Refills: 1 | Status: SHIPPED | OUTPATIENT
Start: 2025-02-21

## 2025-04-17 ENCOUNTER — RESULTS FOLLOW-UP (OUTPATIENT)
Dept: INTERNAL MEDICINE | Facility: CLINIC | Age: 60
End: 2025-04-17

## 2025-04-17 ENCOUNTER — LAB VISIT (OUTPATIENT)
Dept: LAB | Facility: HOSPITAL | Age: 60
End: 2025-04-17
Attending: NURSE PRACTITIONER
Payer: MEDICARE

## 2025-04-17 DIAGNOSIS — I10 PRIMARY HYPERTENSION: ICD-10-CM

## 2025-04-17 DIAGNOSIS — N18.31 CKD STAGE G3A/A1, GFR 45-59 AND ALBUMIN CREATININE RATIO <30 MG/G: ICD-10-CM

## 2025-04-17 DIAGNOSIS — E11.22 TYPE 2 DIABETES MELLITUS WITH STAGE 3A CHRONIC KIDNEY DISEASE, WITH LONG-TERM CURRENT USE OF INSULIN: Chronic | ICD-10-CM

## 2025-04-17 DIAGNOSIS — N18.31 TYPE 2 DIABETES MELLITUS WITH STAGE 3A CHRONIC KIDNEY DISEASE, WITH LONG-TERM CURRENT USE OF INSULIN: Chronic | ICD-10-CM

## 2025-04-17 DIAGNOSIS — Z79.4 TYPE 2 DIABETES MELLITUS WITH STAGE 3A CHRONIC KIDNEY DISEASE, WITH LONG-TERM CURRENT USE OF INSULIN: Chronic | ICD-10-CM

## 2025-04-17 LAB
ALBUMIN SERPL-MCNC: 3.8 G/DL (ref 3.5–5)
ALBUMIN/GLOB SERPL: 0.9 RATIO (ref 1.1–2)
ALP SERPL-CCNC: 91 UNIT/L (ref 40–150)
ALT SERPL-CCNC: 33 UNIT/L (ref 0–55)
ANION GAP SERPL CALC-SCNC: 10 MEQ/L
AST SERPL-CCNC: 38 UNIT/L (ref 11–45)
BACTERIA #/AREA URNS AUTO: NORMAL /HPF
BASOPHILS # BLD AUTO: 0.13 X10(3)/MCL
BASOPHILS NFR BLD AUTO: 2.3 %
BILIRUB SERPL-MCNC: 0.4 MG/DL
BILIRUB UR QL STRIP.AUTO: NEGATIVE
BUN SERPL-MCNC: 32 MG/DL (ref 9.8–20.1)
CALCIUM SERPL-MCNC: 9.7 MG/DL (ref 8.4–10.2)
CHLORIDE SERPL-SCNC: 102 MMOL/L (ref 98–107)
CHOLEST SERPL-MCNC: 160 MG/DL
CHOLEST/HDLC SERPL: 3 {RATIO} (ref 0–5)
CLARITY UR: CLEAR
CO2 SERPL-SCNC: 24 MMOL/L (ref 22–29)
COLOR UR AUTO: YELLOW
CREAT SERPL-MCNC: 1.58 MG/DL (ref 0.55–1.02)
CREAT UR-MCNC: 142.5 MG/DL (ref 45–106)
CREAT/UREA NIT SERPL: 20
EOSINOPHIL # BLD AUTO: 0.46 X10(3)/MCL (ref 0–0.9)
EOSINOPHIL NFR BLD AUTO: 8.2 %
ERYTHROCYTE [DISTWIDTH] IN BLOOD BY AUTOMATED COUNT: 13.4 % (ref 11.5–17)
EST. AVERAGE GLUCOSE BLD GHB EST-MCNC: 151.3 MG/DL
GFR SERPLBLD CREATININE-BSD FMLA CKD-EPI: 38 ML/MIN/1.73/M2
GLOBULIN SER-MCNC: 4.2 GM/DL (ref 2.4–3.5)
GLUCOSE SERPL-MCNC: 161 MG/DL (ref 74–100)
GLUCOSE UR QL STRIP: ABNORMAL
HBA1C MFR BLD: 6.9 %
HCT VFR BLD AUTO: 38 % (ref 37–47)
HDLC SERPL-MCNC: 47 MG/DL (ref 35–60)
HGB BLD-MCNC: 12.3 G/DL (ref 12–16)
HGB UR QL STRIP: NEGATIVE
IMM GRANULOCYTES # BLD AUTO: 0.01 X10(3)/MCL (ref 0–0.04)
IMM GRANULOCYTES NFR BLD AUTO: 0.2 %
KETONES UR QL STRIP: NEGATIVE
LDLC SERPL CALC-MCNC: 93 MG/DL (ref 50–140)
LEUKOCYTE ESTERASE UR QL STRIP: ABNORMAL
LYMPHOCYTES # BLD AUTO: 2.42 X10(3)/MCL (ref 0.6–4.6)
LYMPHOCYTES NFR BLD AUTO: 43.3 %
MCH RBC QN AUTO: 28.5 PG (ref 27–31)
MCHC RBC AUTO-ENTMCNC: 32.4 G/DL (ref 33–36)
MCV RBC AUTO: 88 FL (ref 80–94)
MONOCYTES # BLD AUTO: 0.52 X10(3)/MCL (ref 0.1–1.3)
MONOCYTES NFR BLD AUTO: 9.3 %
NEUTROPHILS # BLD AUTO: 2.05 X10(3)/MCL (ref 2.1–9.2)
NEUTROPHILS NFR BLD AUTO: 36.7 %
NITRITE UR QL STRIP: NEGATIVE
NRBC BLD AUTO-RTO: 0 %
PH UR STRIP: 5 [PH]
PLATELET # BLD AUTO: 321 X10(3)/MCL (ref 130–400)
PMV BLD AUTO: 9.6 FL (ref 7.4–10.4)
POTASSIUM SERPL-SCNC: 4.5 MMOL/L (ref 3.5–5.1)
PROT SERPL-MCNC: 8 GM/DL (ref 6.4–8.3)
PROT UR QL STRIP: NEGATIVE
PROT UR STRIP-MCNC: 9.4 MG/DL
RBC # BLD AUTO: 4.32 X10(6)/MCL (ref 4.2–5.4)
RBC #/AREA URNS AUTO: NORMAL /HPF
SODIUM SERPL-SCNC: 136 MMOL/L (ref 136–145)
SP GR UR STRIP.AUTO: 1.01 (ref 1–1.03)
SQUAMOUS #/AREA URNS AUTO: NORMAL /HPF
T4 FREE SERPL-MCNC: 1.14 NG/DL (ref 0.7–1.48)
TRIGL SERPL-MCNC: 98 MG/DL (ref 37–140)
TSH SERPL-ACNC: 1.26 UIU/ML (ref 0.35–4.94)
URINE PROTEIN/CREATININE RATIO (OLG): 0.1
UROBILINOGEN UR STRIP-ACNC: 0.2
VLDLC SERPL CALC-MCNC: 20 MG/DL
WBC # BLD AUTO: 5.59 X10(3)/MCL (ref 4.5–11.5)
WBC #/AREA URNS AUTO: NORMAL /HPF

## 2025-04-17 PROCEDURE — 84439 ASSAY OF FREE THYROXINE: CPT

## 2025-04-17 PROCEDURE — 85025 COMPLETE CBC W/AUTO DIFF WBC: CPT

## 2025-04-17 PROCEDURE — 83036 HEMOGLOBIN GLYCOSYLATED A1C: CPT

## 2025-04-17 PROCEDURE — 84443 ASSAY THYROID STIM HORMONE: CPT

## 2025-04-17 PROCEDURE — 36415 COLL VENOUS BLD VENIPUNCTURE: CPT

## 2025-04-17 PROCEDURE — 80061 LIPID PANEL: CPT

## 2025-04-17 PROCEDURE — 81003 URINALYSIS AUTO W/O SCOPE: CPT

## 2025-04-17 PROCEDURE — 80053 COMPREHEN METABOLIC PANEL: CPT

## 2025-04-17 PROCEDURE — 82570 ASSAY OF URINE CREATININE: CPT

## 2025-04-23 ENCOUNTER — DOCUMENTATION ONLY (OUTPATIENT)
Dept: NEPHROLOGY | Facility: CLINIC | Age: 60
End: 2025-04-23
Payer: MEDICARE

## 2025-05-19 DIAGNOSIS — E11.22 TYPE 2 DIABETES MELLITUS WITH STAGE 3A CHRONIC KIDNEY DISEASE, WITH LONG-TERM CURRENT USE OF INSULIN: Primary | ICD-10-CM

## 2025-05-19 DIAGNOSIS — Z79.4 TYPE 2 DIABETES MELLITUS WITH STAGE 3A CHRONIC KIDNEY DISEASE, WITH LONG-TERM CURRENT USE OF INSULIN: Primary | ICD-10-CM

## 2025-05-19 DIAGNOSIS — N18.31 TYPE 2 DIABETES MELLITUS WITH STAGE 3A CHRONIC KIDNEY DISEASE, WITH LONG-TERM CURRENT USE OF INSULIN: Primary | ICD-10-CM

## 2025-05-19 RX ORDER — PEN NEEDLE, DIABETIC 32GX 5/32"
NEEDLE, DISPOSABLE MISCELLANEOUS
Qty: 100 EACH | Refills: 0 | Status: SHIPPED | OUTPATIENT
Start: 2025-05-19

## 2025-05-19 NOTE — TELEPHONE ENCOUNTER
"Refill request for BD CAYLA 2ND GEN PEN NEEDLE 32 gauge x 5/32" Ndle in chart. Next appt 05/22/2025.  "

## 2025-05-20 ENCOUNTER — OFFICE VISIT (OUTPATIENT)
Dept: CARDIOLOGY | Facility: CLINIC | Age: 60
End: 2025-05-20
Payer: MEDICARE

## 2025-05-20 ENCOUNTER — LAB VISIT (OUTPATIENT)
Dept: LAB | Facility: HOSPITAL | Age: 60
End: 2025-05-20
Payer: MEDICARE

## 2025-05-20 ENCOUNTER — OFFICE VISIT (OUTPATIENT)
Dept: INTERNAL MEDICINE | Facility: CLINIC | Age: 60
End: 2025-05-20
Payer: MEDICARE

## 2025-05-20 VITALS
RESPIRATION RATE: 18 BRPM | SYSTOLIC BLOOD PRESSURE: 105 MMHG | BODY MASS INDEX: 37.22 KG/M2 | HEART RATE: 64 BPM | WEIGHT: 197.13 LBS | OXYGEN SATURATION: 100 % | DIASTOLIC BLOOD PRESSURE: 66 MMHG | HEIGHT: 61 IN | TEMPERATURE: 98 F

## 2025-05-20 VITALS
HEART RATE: 82 BPM | BODY MASS INDEX: 38.24 KG/M2 | OXYGEN SATURATION: 100 % | TEMPERATURE: 97 F | RESPIRATION RATE: 20 BRPM | SYSTOLIC BLOOD PRESSURE: 111 MMHG | WEIGHT: 194.81 LBS | HEIGHT: 60 IN | DIASTOLIC BLOOD PRESSURE: 69 MMHG

## 2025-05-20 DIAGNOSIS — E11.22 TYPE 2 DIABETES MELLITUS WITH STAGE 3A CHRONIC KIDNEY DISEASE, WITH LONG-TERM CURRENT USE OF INSULIN: Chronic | ICD-10-CM

## 2025-05-20 DIAGNOSIS — N18.32 CKD STAGE G3B/A2, GFR 30-44 AND ALBUMIN CREATININE RATIO 30-299 MG/G: Primary | ICD-10-CM

## 2025-05-20 DIAGNOSIS — I10 PRIMARY HYPERTENSION: Chronic | ICD-10-CM

## 2025-05-20 DIAGNOSIS — Z79.4 TYPE 2 DIABETES MELLITUS WITH STAGE 3A CHRONIC KIDNEY DISEASE, WITH LONG-TERM CURRENT USE OF INSULIN: Chronic | ICD-10-CM

## 2025-05-20 DIAGNOSIS — M54.42 CHRONIC BILATERAL LOW BACK PAIN WITH BILATERAL SCIATICA: Chronic | ICD-10-CM

## 2025-05-20 DIAGNOSIS — N18.31 TYPE 2 DIABETES MELLITUS WITH STAGE 3A CHRONIC KIDNEY DISEASE, WITH LONG-TERM CURRENT USE OF INSULIN: Chronic | ICD-10-CM

## 2025-05-20 DIAGNOSIS — F41.8 MIXED ANXIETY DEPRESSIVE DISORDER: Chronic | ICD-10-CM

## 2025-05-20 DIAGNOSIS — T73.3XXD FATIGUE DUE TO EXCESSIVE EXERTION, SUBSEQUENT ENCOUNTER: ICD-10-CM

## 2025-05-20 DIAGNOSIS — I10 HYPERTENSION, UNSPECIFIED TYPE: Primary | ICD-10-CM

## 2025-05-20 DIAGNOSIS — E78.5 HYPERLIPIDEMIA LDL GOAL <70: ICD-10-CM

## 2025-05-20 DIAGNOSIS — G47.33 OSA (OBSTRUCTIVE SLEEP APNEA): Chronic | ICD-10-CM

## 2025-05-20 DIAGNOSIS — M54.41 CHRONIC BILATERAL LOW BACK PAIN WITH BILATERAL SCIATICA: Chronic | ICD-10-CM

## 2025-05-20 DIAGNOSIS — M51.360 DEGENERATION OF INTERVERTEBRAL DISC OF LUMBAR REGION WITH DISCOGENIC BACK PAIN: ICD-10-CM

## 2025-05-20 DIAGNOSIS — K59.09 CHRONIC CONSTIPATION: ICD-10-CM

## 2025-05-20 DIAGNOSIS — E78.5 HYPERLIPIDEMIA LDL GOAL <70: Chronic | ICD-10-CM

## 2025-05-20 DIAGNOSIS — G89.29 CHRONIC BILATERAL LOW BACK PAIN WITH BILATERAL SCIATICA: Chronic | ICD-10-CM

## 2025-05-20 DIAGNOSIS — R06.09 DOE (DYSPNEA ON EXERTION): ICD-10-CM

## 2025-05-20 PROBLEM — M54.9 CHRONIC BACK PAIN: Status: RESOLVED | Noted: 2022-06-22 | Resolved: 2025-05-20

## 2025-05-20 PROBLEM — L03.039: Status: RESOLVED | Noted: 2023-12-01 | Resolved: 2025-05-20

## 2025-05-20 PROBLEM — N30.00 ACUTE CYSTITIS WITHOUT HEMATURIA: Status: RESOLVED | Noted: 2024-05-16 | Resolved: 2025-05-20

## 2025-05-20 PROBLEM — L03.90 CELLULITIS: Status: RESOLVED | Noted: 2023-01-09 | Resolved: 2025-05-20

## 2025-05-20 PROBLEM — R21 RASH: Status: RESOLVED | Noted: 2023-12-01 | Resolved: 2025-05-20

## 2025-05-20 LAB
ALBUMIN SERPL-MCNC: 3.5 G/DL (ref 3.5–5)
ALBUMIN/GLOB SERPL: 0.8 RATIO (ref 1.1–2)
ALP SERPL-CCNC: 84 UNIT/L (ref 40–150)
ALT SERPL-CCNC: 28 UNIT/L (ref 0–55)
ANION GAP SERPL CALC-SCNC: 5 MEQ/L
AST SERPL-CCNC: 35 UNIT/L (ref 11–45)
BACTERIA #/AREA URNS AUTO: ABNORMAL /HPF
BILIRUB SERPL-MCNC: 0.5 MG/DL
BILIRUB UR QL STRIP.AUTO: NEGATIVE
BUN SERPL-MCNC: 29.5 MG/DL (ref 9.8–20.1)
CALCIUM SERPL-MCNC: 9.4 MG/DL (ref 8.4–10.2)
CHLORIDE SERPL-SCNC: 111 MMOL/L (ref 98–107)
CLARITY UR: CLEAR
CO2 SERPL-SCNC: 22 MMOL/L (ref 22–29)
COLOR UR AUTO: ABNORMAL
CREAT SERPL-MCNC: 1.37 MG/DL (ref 0.55–1.02)
CREAT/UREA NIT SERPL: 22
GFR SERPLBLD CREATININE-BSD FMLA CKD-EPI: 45 ML/MIN/1.73/M2
GLOBULIN SER-MCNC: 4.2 GM/DL (ref 2.4–3.5)
GLUCOSE SERPL-MCNC: 146 MG/DL (ref 74–100)
GLUCOSE UR QL STRIP: ABNORMAL
HGB UR QL STRIP: NEGATIVE
HYALINE CASTS #/AREA URNS LPF: ABNORMAL /LPF
KETONES UR QL STRIP: NEGATIVE
LEUKOCYTE ESTERASE UR QL STRIP: NEGATIVE
MUCOUS THREADS URNS QL MICRO: ABNORMAL /LPF
NITRITE UR QL STRIP: NEGATIVE
OHS QRS DURATION: 82 MS
OHS QTC CALCULATION: 429 MS
PH UR STRIP: 5.5 [PH]
POTASSIUM SERPL-SCNC: 4.5 MMOL/L (ref 3.5–5.1)
PROT SERPL-MCNC: 7.7 GM/DL (ref 6.4–8.3)
PROT UR QL STRIP: NEGATIVE
RBC #/AREA URNS AUTO: ABNORMAL /HPF
SODIUM SERPL-SCNC: 138 MMOL/L (ref 136–145)
SP GR UR STRIP.AUTO: 1.03 (ref 1–1.03)
SQUAMOUS #/AREA URNS LPF: ABNORMAL /HPF
UROBILINOGEN UR STRIP-ACNC: NORMAL
WBC #/AREA URNS AUTO: ABNORMAL /HPF

## 2025-05-20 PROCEDURE — 99215 OFFICE O/P EST HI 40 MIN: CPT | Mod: PBBFAC,25 | Performed by: INTERNAL MEDICINE

## 2025-05-20 PROCEDURE — 3008F BODY MASS INDEX DOCD: CPT | Mod: CPTII,,,

## 2025-05-20 PROCEDURE — 3078F DIAST BP <80 MM HG: CPT | Mod: CPTII,,,

## 2025-05-20 PROCEDURE — 3074F SYST BP LT 130 MM HG: CPT | Mod: CPTII,,,

## 2025-05-20 PROCEDURE — 80053 COMPREHEN METABOLIC PANEL: CPT

## 2025-05-20 PROCEDURE — 99214 OFFICE O/P EST MOD 30 MIN: CPT | Mod: PBBFAC,25,27

## 2025-05-20 PROCEDURE — 1159F MED LIST DOCD IN RCRD: CPT | Mod: CPTII,,,

## 2025-05-20 PROCEDURE — 93005 ELECTROCARDIOGRAM TRACING: CPT

## 2025-05-20 PROCEDURE — 1160F RVW MEDS BY RX/DR IN RCRD: CPT | Mod: CPTII,,,

## 2025-05-20 PROCEDURE — 99214 OFFICE O/P EST MOD 30 MIN: CPT | Mod: S$PBB,,,

## 2025-05-20 PROCEDURE — 81001 URINALYSIS AUTO W/SCOPE: CPT

## 2025-05-20 PROCEDURE — 4010F ACE/ARB THERAPY RXD/TAKEN: CPT | Mod: CPTII,,,

## 2025-05-20 PROCEDURE — 36415 COLL VENOUS BLD VENIPUNCTURE: CPT

## 2025-05-20 PROCEDURE — 3044F HG A1C LEVEL LT 7.0%: CPT | Mod: CPTII,,,

## 2025-05-20 PROCEDURE — G2211 COMPLEX E/M VISIT ADD ON: HCPCS | Mod: S$PBB,,,

## 2025-05-20 RX ORDER — ENALAPRIL MALEATE 2.5 MG/1
2.5 TABLET ORAL DAILY
Qty: 90 TABLET | Refills: 2 | Status: SHIPPED | OUTPATIENT
Start: 2025-05-20 | End: 2026-02-14

## 2025-05-20 RX ORDER — INSULIN GLARGINE-YFGN 100 [IU]/ML
INJECTION, SOLUTION SUBCUTANEOUS
COMMUNITY
Start: 2025-04-05

## 2025-05-20 RX ORDER — TIRZEPATIDE 12.5 MG/.5ML
12.5 INJECTION, SOLUTION SUBCUTANEOUS
Qty: 2 ML | Refills: 8 | Status: SHIPPED | OUTPATIENT
Start: 2025-05-20 | End: 2026-02-14

## 2025-05-20 RX ORDER — HYDROXYZINE PAMOATE 25 MG/1
25 CAPSULE ORAL 3 TIMES DAILY PRN
Qty: 90 CAPSULE | Refills: 2 | Status: SHIPPED | OUTPATIENT
Start: 2025-05-20 | End: 2026-02-14

## 2025-05-20 RX ORDER — DULOXETIN HYDROCHLORIDE 60 MG/1
60 CAPSULE, DELAYED RELEASE ORAL NIGHTLY
Qty: 90 CAPSULE | Refills: 2 | Status: SHIPPED | OUTPATIENT
Start: 2025-05-20 | End: 2026-02-14

## 2025-05-20 RX ORDER — LINACLOTIDE 290 UG/1
290 CAPSULE, GELATIN COATED ORAL
Qty: 90 CAPSULE | Refills: 2 | Status: SHIPPED | OUTPATIENT
Start: 2025-05-20 | End: 2026-02-14

## 2025-05-20 RX ORDER — ATORVASTATIN CALCIUM 40 MG/1
40 TABLET, FILM COATED ORAL NIGHTLY
Qty: 90 TABLET | Refills: 2 | Status: SHIPPED | OUTPATIENT
Start: 2025-05-20 | End: 2026-02-14

## 2025-05-20 RX ORDER — BACLOFEN 10 MG/1
10 TABLET ORAL 2 TIMES DAILY
Qty: 60 TABLET | Refills: 2 | Status: SHIPPED | OUTPATIENT
Start: 2025-05-20

## 2025-05-20 NOTE — ASSESSMENT & PLAN NOTE
Refilled linzess, may take miralax PRN  Educated on high fiber diet and exercise.  Drink at least 64 ozs of water daily.  Eat hot breakfast q am.

## 2025-05-20 NOTE — PROGRESS NOTES
CHIEF COMPLAINT:   Chief Complaint   Patient presents with    Follow-up     Denies chest pains    Dizziness     Occass. Light headed    Shortness of Breath     On exertion                                    HPI:  Gloria Denis 59 y.o. female  with hypertension, hyperlipidemia, sleep apnea, diabetes mellitus, depression, anxiety, and chronic back pain who presents for follow up and ongoing care.      Patient has been following with cardiology for several years due to ongoing shortness or breath.  Last echocardiogram in June 2022 revealed preserved EF with grade 1 diastolic dysfunction no other major abnormalities.  During last 2 visits an echo was ordered but never obtained.    Since 2020, the patient has lost about 70 lbs (including 21 lbs in the last year) and reports having improved mobility and dyspnea on exertion.  However she continues to complain of dyspnea on moderate exertion.  She is not very active at baseline but reports no dyspnea with ADLs.  She uses a motorized cart when grocery shopping.  She denies orthopnea (uses 2 pillows for comfort), PND or lower extremity edema.  Patient's main complaint today is dizziness on change in position.  Patient also reports occasional palpitations but she reports they are not bothersome.  She has not used her CPAP in 1.5-2 years but that was not for any specific reason.  She just reported she stopped using it a while back and never really tried using it.  She usually sleeps from 6-7 p.m. to 4:00 a.m.  She denies any anginal chest pain but reports she recently started experiencing burning sensation in her throat.  She has not been taking her Prilosec.                                                                                                                                                                                                                                                                                                                                                                                                                            CARDIAC TESTING:    Nuclear Stress Test 10.1.24    Normal myocardial perfusion scan. There is no evidence of myocardial ischemia or infarction.    The ECG portion of the study is abnormal but not diagnostic.    AMBER Testing 7.15.22:                                                                                                       The right lower extremity demonstrated multiphasic waveforms at all levels.   The AMBER on the right was normal at 1.15.  The TBI on the right was normal at 0.77.  The right lower extremity demonstrated no significant arterial flow reduction.     The left lower extremity demonstrated multiphasic waveforms at all levels.   The AMBER on the left was invalid due to non-compressible vessels at the ankle.  The TBI on the left was normal at 0.80.  The left lower extremity demonstrated no significant arterial flow reduction.     Echo 6.30.22  · The left ventricle is normal in size with concentric remodeling and normal systolic function.  · The estimated ejection fraction is 60%.  · Grade I left ventricular diastolic dysfunction.  · Normal right ventricular size with normal right ventricular systolic function.  · Normal central venous pressure (3 mmHg).  · The estimated PA systolic pressure is 23 mmHg.  · IVC is normal.  · There is no pulmonary hypertension.     Lexiscan stress test July 2020:  Scan is consistent with: No ischemia  No scar  Attenuation artifact in the inferolateral wall  Ejection fraction: 90%.    AMBER testing June 2020:  No evidence of significant arterial insufficiency was identified in the study.    Echocardiogram June 2019:  Left ventricular ejection fraction is measured at approximately 55-60%.  Normal right ventricular size with preserved RV function.  No significant valvular abnormalities.  There is trace tricuspid regurgitation with estimated RVSP of 24 mmHg.  No evidence of  pericardial effusion.             Patient Active Problem List   Diagnosis    Mixed anxiety depressive disorder    Chronic back pain    Diabetic neuropathy    Hyperlipidemia LDL goal <70    Primary hypertension    Sleep apnea    Sciatica    Chronic constipation    Gastroesophageal reflux disease    Screening for colon cancer    CKD stage G3b/A2, GFR 30-44 and albumin creatinine ratio  mg/g    Chronic bilateral low back pain with bilateral sciatica    BMI 40.0-44.9, adult    CROUCH (dyspnea on exertion)    Cellulitis    Type 2 diabetes mellitus with stage 3a chronic kidney disease, with long-term current use of insulin    JORGE ALBERTO (obstructive sleep apnea)    Polyneuropathy associated with underlying disease    NPDR (nonproliferative diabetic retinopathy)    DDD (degenerative disc disease), lumbar    Central stenosis of spinal canal    Class 3 severe obesity due to excess calories with serious comorbidity and body mass index (BMI) of 40.0 to 44.9 in adult    Chronic left shoulder pain    Paronychia of second toe    Rash    Acute cystitis without hematuria    Fatigue due to excessive exertion     Past Surgical History:   Procedure Laterality Date    ABSCESS DRAINAGE      I don't remember the date    ARTHROSCOPIC REMOVAL OF LOOSE BODY FROM JOINT       SECTION      CHOLECYSTECTOMY      Drainage of oral abscess      Exploration using laparoscope      FRACTURE SURGERY      UPPER GASTROINTESTINAL ENDOSCOPY       Social History     Socioeconomic History    Marital status:      Spouse name: Kp   Tobacco Use    Smoking status: Never     Passive exposure: Never    Smokeless tobacco: Never   Substance and Sexual Activity    Alcohol use: Never    Drug use: Never    Sexual activity: Not Currently     Partners: Male     Birth control/protection: None     Social Drivers of Health     Financial Resource Strain: Medium Risk (2025)    Overall Financial Resource Strain (CARDIA)     Difficulty of Paying Living  Expenses: Somewhat hard   Food Insecurity: No Food Insecurity (1/17/2025)    Hunger Vital Sign     Worried About Running Out of Food in the Last Year: Never true     Ran Out of Food in the Last Year: Never true   Transportation Needs: No Transportation Needs (2/15/2024)    PRAPARE - Transportation     Lack of Transportation (Medical): No     Lack of Transportation (Non-Medical): No   Physical Activity: Inactive (1/17/2025)    Exercise Vital Sign     Days of Exercise per Week: 0 days     Minutes of Exercise per Session: 0 min   Stress: No Stress Concern Present (1/17/2025)    [employer] - Occupational Stress Questionnaire     Feeling of Stress : Not at all   Housing Stability: Low Risk  (2/15/2024)    Housing Stability Vital Sign     Unable to Pay for Housing in the Last Year: No     Number of Places Lived in the Last Year: 1     Unstable Housing in the Last Year: No        Family History   Problem Relation Name Age of Onset    Cancer Mother Patrica ROSS Cadena     Heart disease Mother Patrica Laneblanc     Rheum arthritis Mother Patrica ROSS Cadena     Arthritis Mother Patrica Laneblanc     Hypertension Mother Patrica Laneblanc     Miscarriages / Stillbirths Mother Patrica Lnaeblanc     Diabetes Father Devon Cadena     Heart attack Father Devon Cadena     Kidney failure Father Devon Cadena     Stroke Father Devon Cadena     Arthritis Father Devon Acdena     Hypertension Father Devon Cadena     Kidney disease Father Devon Cadena     Vision loss Father Devon Cadena     Cancer Brother Jalil Laneblanc     COPD Sister Tanya Cadena     Diabetes Sister Tanya Cadena     COPD Sister Staci Cadena Dominique     Diabetes Sister Staci Cadena Dominique     Diabetes Sister Kae Cadena Laws     Diabetes Sister James Miguel      Review of patient's allergies indicates:   Allergen Reactions    Aspirin          ROS:  Review of Systems   Constitutional:  Positive  "for malaise/fatigue.   HENT: Negative.     Eyes: Negative.    Respiratory:  Positive for shortness of breath.    Cardiovascular:  Positive for palpitations (Intermittent). Negative for chest pain, orthopnea, claudication, leg swelling and PND.        CROUCH   Gastrointestinal: Negative.    Genitourinary: Negative.    Musculoskeletal: Negative.    Skin: Negative.    Neurological: Negative.  Negative for weakness.   Endo/Heme/Allergies: Negative.    Psychiatric/Behavioral: Negative.                                                                                                                                                                                  Negative except as stated in the history of present illness. See HPI for details.    PHYSICAL EXAM:  Visit Vitals  /69 (BP Location: Left arm, Patient Position: Sitting)   Pulse 82   Temp 97.3 °F (36.3 °C) (Oral)   Resp 20   Ht 5' (1.524 m)   Wt 88.4 kg (194 lb 12.8 oz)   SpO2 100%   BMI 38.04 kg/m²         General: alert and oriented/no acute distress  Eye: EOMI/normal conjunctiva/no xanthelasma  HENT: normocephalic/moist oral mucosa  Neck: supple/nontender/no carotid bruit  Respiratory: lungs CTA/nonlabored respirations/BS equal/symmetrical expansion/no  chest wall tenderness  Cardiovascular: normal rate/normal rhythm/no murmur/normal peripheral perfusion/no  edema/no JVD  Gastrointestinal: soft/nontender  Musculoskeletal: normal ROM  Integumentary: warm/dry/pink/intact  Neurologic: alert/oriented/normal sensory/no focal deficits  Psychiatric: cooperative/appropriate mood and affect/normal judgment      Current Outpatient Medications   Medication Instructions    amoxicillin (AMOXIL) 500 mg, Every 8 hours    atorvastatin (LIPITOR) 40 mg, Oral    baclofen (LIORESAL) 10 mg, Oral, 2 times daily    BD CAYLA 2ND GEN PEN NEEDLE 32 gauge x 5/32" Ndle USE TO INJECT INSULIN EVERY  NIGHT    blood sugar diagnostic Strp To check BG 2 times daily, to use with insurance " preferred meter    blood-glucose meter kit To check BG 2 times daily, to use with insurance preferred meter    doxycycline (VIBRAMYCIN) 100 mg, 2 times daily    DULoxetine (CYMBALTA) 60 mg, Oral, Nightly    empagliflozin (JARDIANCE) 10 mg, Oral, Daily    enalapril (VASOTEC) 2.5 mg, Oral, Daily    ergocalciferol (ERGOCALCIFEROL) 50,000 Units, Oral, Every 7 days    hydrOXYzine pamoate (VISTARIL) 25 mg, Oral, 3 times daily PRN    lancets (ACCU-CHEK SOFTCLIX LANCETS) Misc USE 1  TO CHECK GLUCOSE TWICE DAILY    LINZESS 290 mcg, Oral, Before breakfast    metoprolol tartrate (LOPRESSOR) 25 mg, Oral, 2 times daily    nystatin (MYCOSTATIN) ointment 2 times daily    omeprazole (PRILOSEC) 40 mg, Oral, Daily    ondansetron (ZOFRAN-ODT) 4 mg, Oral, Every 8 hours PRN    SEMGLEE,INSULIN GLARG-YFGN, unit/mL (3 mL) InPn SMARTSI Unit(s) SUB-Q Every Evening    SSD 1 % cream 2 times daily    tirzepatide 10 mg, Subcutaneous, Every 7 days    triamcinolone acetonide 0.1% (KENALOG) 0.1 % cream 2 times daily        All medications, laboratory studies, cardiac diagnostic imaging reviewed.     Lab Results   Component Value Date    LDL 93.00 2025    LDL 66.00 2024    TRIG 98 2025    TRIG 103 2024    CREATININE 1.37 (H) 2025    K 4.5 2025        ASSESSMENT/PLAN:    Dyspnea on exertion  Fatigue  Symptoms have been ongoing and stable  No concerns for heart failure as pt has no congestive symptoms. Nuclear stress test normal  Suspect symptoms related to deconditioning, untreated sleep apnea   Advised her to stop metoprolol and if she does not notice worsening in her palpitations, to stop it altogether as she might have better exercise tolerance off BB  Also strongly urged her to use her CPAP    Orthostatic lightheadedness  Instructed the patient to change positions slowly  Also advised her to do seated muscle strengthening exercises at home (provided educational material)     Hypertension  - BP  111/69  - Continue Enalapril 2.5mg    Hyperlipidemia   - LDL 66 per labs May 2024, repeat lipid panel in 4/2025 showed LDL 93  Will continue to monitor for now and Continue Atorvastatin 40mg daily for now    Sleep apnea on BiPAP   Explained to the patient the deleterious effects of untreated sleep apnea including but not limited to pulmonary HTN, systemic HTN and atrial arrhythmia and the importance of compliance with CPAP.  As patient has lost large amounts of weight, she might need retitration of her BiPAP     Diabetes mellitus  - Management per PCP    Depression/Anxiety  - Management per PCP    Chronic back pain  - Continue management per PCP    Bilateral lower extremity pain  - AMBER testing July 2022:  no significant arterial flow reduction in the bilateral lower extremities  - States that her pain is mostly due to her chronic sciatic nerve pain      Acacia Sin MD

## 2025-05-20 NOTE — ASSESSMENT & PLAN NOTE
A1C increased to 6.9, previously 6.1  Metformin d/alfonso, continue other meds  Increase Mounjaro 12.5mg   She has been taking 20U insulin due to hyperglycemia, reduce once CBGs improve with inc mounjaro  Renal function stable- follow with nephrology  Follow ADA Diet. Avoid soda, simple sweets, and limit rice/pasta/breads/starches (no more than 45-50 grams per meal).  Maintain healthy weight with goal BMI <30.  Exercise 5 times per week for 30 minutes per day.  Stressed importance of daily foot exams.  Stressed importance of annual dilated eye exam.

## 2025-05-20 NOTE — ASSESSMENT & PLAN NOTE
LDL above goal  Continue atorvastatin 40mg and will continue to monitor (per cardiology note)  Stressed importance of dietary modifications. Follow a low cholesterol, low saturated fat diet with less that 200mg of cholesterol a day.  Avoid fried foods and high saturated fats (high saturated fats less than 7% of calories).  Add Flax Seed/Fish Oil supplements to diet. Increase dietary fiber.  Regular exercise can reduce LDL and raise HDL. Stressed importance of physical activity 5 times per week for 30 minutes per day.

## 2025-05-20 NOTE — ASSESSMENT & PLAN NOTE
Refilled baclofen   Perform back stretches daily.   Avoid activities than increase back pain or stiffness.   Apply heating pad, ice pack, and Icy Hot / BioFreeze as needed; alternate every 15-20 minutes.   Massage back to loosen muscles.

## 2025-05-20 NOTE — ASSESSMENT & PLAN NOTE
At goal but having low readings at home  Continue enalapril   Holding metoprolol x 7 days and will follow up with cardiology   Follow a low sodium (less than 2 grams of sodium per day), DASH diet.   Continue medications as prescribed.  Monitor blood pressure and report any consistent values greater than 140/90 and keep a log.  Maintain healthy weight with a BMI goal of <30.   Aerobic exercise for 150 minutes per week (or 5 days a week for 30 minutes each day).

## 2025-05-20 NOTE — PROGRESS NOTES
Lindsey Carbajal, ALYX   [facility]  [address] [id number]      PATIENT NAME: Gloria Denis  : 1965  DATE: 25  MRN: [id number]      Reason for Visit / Chief Complaint: Diabetes (3 month F/U)       History of Present Illness / Problem Focused Workflow     Gloria Denis presents to the clinic with Diabetes (3 month F/U)     59 y.o.  female presents to the clinic.  Medical problems include HTN, HLD, JORGE ALBERTO on Bipap, DM, DM neuropathy, depression, anxiety, CKD, hyperkalemia, L NPDR, GERD, chronic constipation, morbid obesity, atrophic vaginitis, Yolanda-Yolanda dz, and chronic back pain. Followed by [facility] cardio, GYN, renal and GI clinics. Followed by Dr Hughes in [place] annually, declines screening photos in clinic.     10/8/24  Pt presents for DM follow up. A1c and BMP ordered, but not completed. POC A1C today 6.1, she is reporting compliance with medications, renal d/c metformin. Less appetite suppression with mounjaro over the last month. [place] goal agreed to reduce insulin dose and titrate mounjaro up with goal of improved insulin resistance and weight loss. Will send mounjaro 7.5, pt to reduce lantus to 15U QHS once she starts new dose. She is compliant with CBG checks, FBG around 120 most days. Agreeable to DM foot exam. Mammogram ordered for next month. RTC in 2 months for DM follow up and continue med titration.     25  Pt presents virtually for DM medication management. She is now taking mounjaro 10mg dose and lantus 15U QHS. She has tolerated increased dose well. FBG in the low hundreds most days other than when she is drinking juice in the night time, discouraged her from doing so. Instructed her if CBGs are <110 consistently she can reduce lantus to 12 units. Report any lows to clinic. She feels the appetite suppression is not as strong as when she first started the  "medication. She is complaining of heel pain for the last few days, encouraged her to roll foot on frozen water bottle or tennis ball for plantar fascitis, contact clinic if no improvement. Denies acute complaints. RTC 3 months with labs prior for DM, HTN, HLD follow up.     5/20/25  Pt presents for DM, HLD, HTN follow up. Labs from last month with A1C 6.9%, LDL is not at goal (<70). She has increased insulin dose to 20U due to hyperglycemia in home readings. A1C has increased almost a whole point since LOV. Goal was to titrate insulin down while increase mounjaro. Will send for inc mounjaro dose to 12.5mg and she will reduce insulin once CBG improve. Blood pressure at goal in clinic, but she saw cardiology today, she was told to hold metoprolol by them due to episodes of dizziness and hypotension. She reports medication compliance. She denies acute complaints, refills provided. She will contact clinic for any issues obtaining moujaro. RTC 6 months for routine follow up with labs.           Review of Systems     Review of Systems   Constitutional:  Negative for fatigue, fever and unexpected weight change.   HENT:  Negative for ear pain, hearing loss, trouble swallowing and voice change.    Respiratory:  Negative for cough and shortness of breath.    Cardiovascular:  Negative for chest pain and palpitations.   Gastrointestinal:  Negative for abdominal pain, diarrhea and vomiting.   Genitourinary:  Negative for dysuria.   Musculoskeletal:  Negative for gait problem.   Skin:  Negative for rash and wound.   Neurological:  Negative for weakness.   Psychiatric/Behavioral:  Negative for suicidal ideas.          Medications and Allergies     Medications  Medication List with Changes/Refills   New Medications    TIRZEPATIDE (MOUNJARO) 12.5 MG/0.5 ML PNIJ    Inject 12.5 mg into the skin every 7 days.   Current Medications    BD CAYLA 2ND GEN PEN NEEDLE 32 GAUGE X 5/32" NDLE    USE TO INJECT INSULIN EVERY  NIGHT    BLOOD SUGAR " DIAGNOSTIC STRP    To check BG 2 times daily, to use with insurance preferred meter    BLOOD-GLUCOSE METER KIT    To check BG 2 times daily, to use with insurance preferred meter    ERGOCALCIFEROL (ERGOCALCIFEROL) 50,000 UNIT CAP    Take 1 capsule (50,000 Units total) by mouth every 7 days.    LANCETS (ACCU-CHEK SOFTCLIX LANCETS) MISC    USE 1  TO CHECK GLUCOSE TWICE DAILY    METOPROLOL TARTRATE (LOPRESSOR) 25 MG TABLET    Take 1 tablet by mouth twice daily    OMEPRAZOLE (PRILOSEC) 40 MG CAPSULE    Take 1 capsule (40 mg total) by mouth once daily.    SEMGLEE,INSULIN GLARG-YFGN, UNIT/ML (3 ML) INPN    SMARTSI Unit(s) SUB-Q Every Evening    SSD 1 % CREAM    Apply topically 2 (two) times daily.    TRIAMCINOLONE ACETONIDE 0.1% (KENALOG) 0.1 % CREAM    Apply topically 2 (two) times daily.   Changed and/or Refilled Medications    Modified Medication Previous Medication    ATORVASTATIN (LIPITOR) 40 MG TABLET atorvastatin (LIPITOR) 40 MG tablet       Take 1 tablet (40 mg total) by mouth every evening.    Take 1 tablet by mouth once daily    BACLOFEN (LIORESAL) 10 MG TABLET baclofen (LIORESAL) 10 MG tablet       Take 1 tablet (10 mg total) by mouth 2 (two) times daily.    Take 1 tablet (10 mg total) by mouth 2 (two) times daily.    DULOXETINE (CYMBALTA) 60 MG CAPSULE DULoxetine (CYMBALTA) 60 MG capsule       Take 1 capsule (60 mg total) by mouth every evening.    Take 1 capsule (60 mg total) by mouth every evening.    EMPAGLIFLOZIN (JARDIANCE) 10 MG TABLET empagliflozin (JARDIANCE) 10 mg tablet       Take 1 tablet (10 mg total) by mouth once daily.    Take 1 tablet (10 mg total) by mouth once daily.    ENALAPRIL (VASOTEC) 2.5 MG TABLET enalapril (VASOTEC) 2.5 MG tablet       Take 1 tablet (2.5 mg total) by mouth once daily.    Take 1 tablet (2.5 mg total) by mouth once daily.    HYDROXYZINE PAMOATE (VISTARIL) 25 MG CAP hydrOXYzine pamoate (VISTARIL) 25 MG Cap       Take 1 capsule (25 mg total) by mouth 3 (three)  times daily as needed (anxiety).    Take 1 capsule (25 mg total) by mouth 3 (three) times daily as needed (anxiety).    LINZESS 290 MCG CAP CAPSULE LINZESS 290 mcg Cap capsule       Take 1 capsule (290 mcg total) by mouth before breakfast.    Take 1 capsule (290 mcg total) by mouth before breakfast.   Discontinued Medications    AMOXICILLIN (AMOXIL) 500 MG CAPSULE    Take 500 mg by mouth every 8 (eight) hours.    DOXYCYCLINE (VIBRAMYCIN) 100 MG CAP    Take 100 mg by mouth 2 (two) times daily.    NYSTATIN (MYCOSTATIN) OINTMENT    Apply topically 2 (two) times daily.    ONDANSETRON (ZOFRAN-ODT) 4 MG TBDL    Take 1 tablet (4 mg total) by mouth every 8 (eight) hours as needed (nausea and vomiting).    TIRZEPATIDE 10 MG/0.5 ML PNIJ    Inject 10 mg into the skin every 7 days.         Allergies  Review of patient's allergies indicates:   Allergen Reactions    Aspirin        Physical Examination     Vitals:    05/20/25 1329   BP: 105/66   Pulse: 64   Resp: 18   Temp: 97.8 °F (36.6 °C)     Physical Exam  Vitals and nursing note reviewed.   Constitutional:       General: She is not in acute distress.     Appearance: She is not ill-appearing.   HENT:      Head: Normocephalic and atraumatic.      Mouth/Throat:      Mouth: Mucous membranes are moist.      Pharynx: Oropharynx is clear.   Eyes:      General: No scleral icterus.     Extraocular Movements: Extraocular movements intact.      Conjunctiva/sclera: Conjunctivae normal.      Pupils: Pupils are equal, round, and reactive to light.   Neck:      Vascular: No carotid bruit.   Cardiovascular:      Rate and Rhythm: Normal rate and regular rhythm.      Heart sounds: No murmur heard.     No friction rub. No gallop.   Pulmonary:      Effort: Pulmonary effort is normal. No respiratory distress.      Breath sounds: Normal breath sounds. No wheezing, rhonchi or rales.   Abdominal:      General: Abdomen is flat. Bowel sounds are normal. There is no distension.      Palpations: Abdomen  is soft. There is no mass.      Tenderness: There is no abdominal tenderness.   Musculoskeletal:         General: Normal range of motion.      Cervical back: Normal range of motion and neck supple.   Skin:     General: Skin is warm and dry.   Neurological:      General: No focal deficit present.      Mental Status: She is alert.   Psychiatric:         Mood and Affect: Mood normal.           Results     Lab Results   Component Value Date    WBC 5.59 04/17/2025    RBC 4.32 04/17/2025    HGB 12.3 04/17/2025    HCT 38.0 04/17/2025    MCV 88.0 04/17/2025    MCH 28.5 04/17/2025    MCHC 32.4 (L) 04/17/2025    RDW 13.4 04/17/2025     04/17/2025    MPV 9.6 04/17/2025     Sodium   Date Value Ref Range Status   05/20/2025 138 136 - 145 mmol/L Final     Potassium   Date Value Ref Range Status   05/20/2025 4.5 3.5 - 5.1 mmol/L Final     Chloride   Date Value Ref Range Status   05/20/2025 111 (H) 98 - 107 mmol/L Final     CO2   Date Value Ref Range Status   05/20/2025 22 22 - 29 mmol/L Final     Glucose   Date Value Ref Range Status   05/20/2025 146 (H) 74 - 100 mg/dL Final     Blood Urea Nitrogen   Date Value Ref Range Status   05/20/2025 29.5 (H) 9.8 - 20.1 mg/dL Final     Creatinine   Date Value Ref Range Status   05/20/2025 1.37 (H) 0.55 - 1.02 mg/dL Final     Calcium   Date Value Ref Range Status   05/20/2025 9.4 8.4 - 10.2 mg/dL Final     Protein Total   Date Value Ref Range Status   05/20/2025 7.7 6.4 - 8.3 gm/dL Final     Albumin   Date Value Ref Range Status   05/20/2025 3.5 3.5 - 5.0 g/dL Final     Bilirubin Total   Date Value Ref Range Status   05/20/2025 0.5 <=1.5 mg/dL Final     ALP   Date Value Ref Range Status   05/20/2025 84 40 - 150 unit/L Final     AST   Date Value Ref Range Status   05/20/2025 35 11 - 45 unit/L Final     ALT   Date Value Ref Range Status   05/20/2025 28 0 - 55 unit/L Final     Estimated GFR-Non    Date Value Ref Range Status   02/21/2022 49 >=90      Lab Results    Component Value Date    CHOL 160 04/17/2025     Lab Results   Component Value Date    HDL 47 04/17/2025     Lab Results   Component Value Date    TRIG 98 04/17/2025     Lab Results   Component Value Date    VLDL 20 04/17/2025     Lab Results   Component Value Date    LDL 93.00 04/17/2025     Lab Results   Component Value Date    TSH 1.256 04/17/2025     Lab Results   Component Value Date    PHUR 5.5 05/20/2025    PROTEINUA Negative 05/20/2025    GLUCUA 4+ (A) 05/20/2025    KETONESU Negative 02/21/2022    OCCULTUA Negative 05/20/2025    NITRITE Negative 05/20/2025    LEUKOCYTESUR Negative 05/20/2025     Lab Results   Component Value Date    HGBA1C 6.9 04/17/2025    HGBA1C 6.2 11/15/2024    HGBA1C 6.6 05/13/2024           Assessment         ICD-10-CM ICD-9-CM   1. CKD stage G3b/A2, GFR 30-44 and albumin creatinine ratio  mg/g  N18.32 585.3   2. Type 2 diabetes mellitus with stage 3a chronic kidney disease, with long-term current use of insulin  E11.22 250.40    N18.31 585.3    Z79.4 V58.67   3. Hyperlipidemia LDL goal <70  E78.5 272.4   4. Degeneration of intervertebral disc of lumbar region with discogenic back pain  M51.360 722.52   5. Mixed anxiety depressive disorder  F41.8 300.4   6. Chronic constipation  K59.09 564.00   7. Primary hypertension  I10 401.9       Plan      Problem List Items Addressed This Visit       Mixed anxiety depressive disorder (Chronic)    Current Assessment & Plan   Refilled cymbalta.  Denies SI/HI.  Read positive daily meditations, avoid negative media, set healthy boundaries.  Exercise daily, keep consistent sleep pattern, eat a healthy diet.  Establish good social support, make changes to reduce stress.  Do not drink alcohol or use illicit drugs.  Reports any symptoms of suicidal/homicidal ideations or self harm immediately, go to nearest emergency room.            Relevant Medications    DULoxetine (CYMBALTA) 60 MG capsule    hydrOXYzine pamoate (VISTARIL) 25 MG Cap     Hyperlipidemia LDL goal <70 (Chronic)    Current Assessment & Plan   LDL above goal  Continue atorvastatin 40mg and will continue to monitor (per cardiology note)  Stressed importance of dietary modifications. Follow a low cholesterol, low saturated fat diet with less that 200mg of cholesterol a day.  Avoid fried foods and high saturated fats (high saturated fats less than 7% of calories).  Add Flax Seed/Fish Oil supplements to diet. Increase dietary fiber.  Regular exercise can reduce LDL and raise HDL. Stressed importance of physical activity 5 times per week for 30 minutes per day.            Relevant Medications    atorvastatin (LIPITOR) 40 MG tablet    Other Relevant Orders    Lipid Panel    Primary hypertension (Chronic)    Current Assessment & Plan   At goal but having low readings at home  Continue enalapril   Holding metoprolol x 7 days and will follow up with cardiology   Follow a low sodium (less than 2 grams of sodium per day), DASH diet.   Continue medications as prescribed.  Monitor blood pressure and report any consistent values greater than 140/90 and keep a log.  Maintain healthy weight with a BMI goal of <30.   Aerobic exercise for 150 minutes per week (or 5 days a week for 30 minutes each day).          Relevant Medications    enalapril (VASOTEC) 2.5 MG tablet    Other Relevant Orders    CBC Auto Differential    Comprehensive Metabolic Panel    TSH    T4, Free    Chronic constipation    Current Assessment & Plan   Refilled linzess, may take miralax PRN  Educated on high fiber diet and exercise.  Drink at least 64 ozs of water daily.  Eat hot breakfast q am.           Relevant Medications    LINZESS 290 mcg Cap capsule    CKD stage G3b/A2, GFR 30-44 and albumin creatinine ratio  mg/g - Primary    Current Assessment & Plan   Lab Results   Component Value Date    EGFRNORACEVR 45 05/20/2025    EGFRNORACEVR 38 04/17/2025   Follow with nephrology as scheduled  Stable from renal standpoint.  Follow  renoprotective measures including Renal Diet (reduce intake of nuts, peanut butter, milk, cheese, dried beans, peas) and Low Sodium Diet (less than 2 grams per day).  Avoid NSAIDs (Aleve, Mobic, Celebrex, Ibuprofen, Advil, Toradol and Diclofenac). May take Tylenol as needed for headache/pain.  Control DM with goal A1C <7. BP goal <130/80. LDL goal < 100.  Stay well hydrated. Avoid alcohol and soda. Limit tea and coffee.         Type 2 diabetes mellitus with stage 3a chronic kidney disease, with long-term current use of insulin (Chronic)    Current Assessment & Plan   A1C increased to 6.9, previously 6.1  Metformin d/alfonso, continue other meds  Increase Mounjaro 12.5mg   She has been taking 20U insulin due to hyperglycemia, reduce once CBGs improve with inc mounjaro  Renal function stable- follow with nephrology  Follow ADA Diet. Avoid soda, simple sweets, and limit rice/pasta/breads/starches (no more than 45-50 grams per meal).  Maintain healthy weight with goal BMI <30.  Exercise 5 times per week for 30 minutes per day.  Stressed importance of daily foot exams.  Stressed importance of annual dilated eye exam.         Relevant Medications    tirzepatide (MOUNJARO) 12.5 mg/0.5 mL PnIj    empagliflozin (JARDIANCE) 10 mg tablet    Other Relevant Orders    Comprehensive Metabolic Panel    Hemoglobin A1C    Urinalysis, Reflex to Urine Culture    Microalbumin/Creatinine Ratio, Urine    DDD (degenerative disc disease), lumbar (Chronic)    Current Assessment & Plan   Refilled baclofen   Perform back stretches daily.   Avoid activities than increase back pain or stiffness.   Apply heating pad, ice pack, and Icy Hot / BioFreeze as needed; alternate every 15-20 minutes.   Massage back to loosen muscles.            Relevant Medications    baclofen (LIORESAL) 10 MG tablet       Future Appointments   Date Time Provider Department Center   7/30/2025 10:15 AM Charlene Pineda FNP [facility]   11/20/2025 10:00 AM January  Acacia FRANCIS MD [facility]   11/25/2025  9:20 AM Lindsey Carbajal FNP [facility]            Signature:      [facility]  [address][1] [phone]    Date of encounter: 5/20/25

## 2025-05-20 NOTE — ASSESSMENT & PLAN NOTE
Lab Results   Component Value Date    EGFRNORACEVR 45 05/20/2025    EGFRNORACEVR 38 04/17/2025   Follow with nephrology as scheduled  Stable from renal standpoint.  Follow renoprotective measures including Renal Diet (reduce intake of nuts, peanut butter, milk, cheese, dried beans, peas) and Low Sodium Diet (less than 2 grams per day).  Avoid NSAIDs (Aleve, Mobic, Celebrex, Ibuprofen, Advil, Toradol and Diclofenac). May take Tylenol as needed for headache/pain.  Control DM with goal A1C <7. BP goal <130/80. LDL goal < 100.  Stay well hydrated. Avoid alcohol and soda. Limit tea and coffee.

## 2025-05-27 DIAGNOSIS — N18.31 TYPE 2 DIABETES MELLITUS WITH STAGE 3A CHRONIC KIDNEY DISEASE, WITH LONG-TERM CURRENT USE OF INSULIN: ICD-10-CM

## 2025-05-27 DIAGNOSIS — Z79.4 TYPE 2 DIABETES MELLITUS WITH STAGE 3A CHRONIC KIDNEY DISEASE, WITH LONG-TERM CURRENT USE OF INSULIN: ICD-10-CM

## 2025-05-27 DIAGNOSIS — E11.22 TYPE 2 DIABETES MELLITUS WITH STAGE 3A CHRONIC KIDNEY DISEASE, WITH LONG-TERM CURRENT USE OF INSULIN: ICD-10-CM

## 2025-05-27 RX ORDER — LANCETS
EACH MISCELLANEOUS
Qty: 100 EACH | Refills: 6 | Status: SHIPPED | OUTPATIENT
Start: 2025-05-27

## 2025-07-23 DIAGNOSIS — N18.31 CKD STAGE G3A/A1, GFR 45-59 AND ALBUMIN CREATININE RATIO <30 MG/G: Primary | ICD-10-CM

## 2025-07-29 ENCOUNTER — LAB VISIT (OUTPATIENT)
Dept: LAB | Facility: HOSPITAL | Age: 60
End: 2025-07-29
Attending: NURSE PRACTITIONER
Payer: MEDICARE

## 2025-07-29 DIAGNOSIS — N18.31 CKD STAGE G3A/A1, GFR 45-59 AND ALBUMIN CREATININE RATIO <30 MG/G: ICD-10-CM

## 2025-07-29 LAB
ALBUMIN SERPL-MCNC: 3.6 G/DL (ref 3.5–5)
ALBUMIN/GLOB SERPL: 0.8 RATIO (ref 1.1–2)
ALP SERPL-CCNC: 96 UNIT/L (ref 40–150)
ALT SERPL-CCNC: 25 UNIT/L (ref 0–55)
ANION GAP SERPL CALC-SCNC: 9 MEQ/L
AST SERPL-CCNC: 34 UNIT/L (ref 11–45)
BILIRUB SERPL-MCNC: 0.3 MG/DL
BUN SERPL-MCNC: 29 MG/DL (ref 9.8–20.1)
CALCIUM SERPL-MCNC: 9.6 MG/DL (ref 8.4–10.2)
CHLORIDE SERPL-SCNC: 108 MMOL/L (ref 98–107)
CO2 SERPL-SCNC: 22 MMOL/L (ref 22–29)
CREAT SERPL-MCNC: 1.45 MG/DL (ref 0.55–1.02)
CREAT/UREA NIT SERPL: 20
GFR SERPLBLD CREATININE-BSD FMLA CKD-EPI: 42 ML/MIN/1.73/M2
GLOBULIN SER-MCNC: 4.4 GM/DL (ref 2.4–3.5)
GLUCOSE SERPL-MCNC: 124 MG/DL (ref 74–100)
POTASSIUM SERPL-SCNC: 4.3 MMOL/L (ref 3.5–5.1)
PROT SERPL-MCNC: 8 GM/DL (ref 6.4–8.3)
SODIUM SERPL-SCNC: 139 MMOL/L (ref 136–145)

## 2025-07-29 PROCEDURE — 80053 COMPREHEN METABOLIC PANEL: CPT

## 2025-07-29 PROCEDURE — 36415 COLL VENOUS BLD VENIPUNCTURE: CPT

## 2025-07-30 ENCOUNTER — OFFICE VISIT (OUTPATIENT)
Dept: NEPHROLOGY | Facility: CLINIC | Age: 60
End: 2025-07-30
Payer: MEDICARE

## 2025-07-30 DIAGNOSIS — I10 PRIMARY HYPERTENSION: ICD-10-CM

## 2025-07-30 DIAGNOSIS — E66.01 SEVERE OBESITY (BMI 35.0-39.9) WITH COMORBIDITY: ICD-10-CM

## 2025-07-30 DIAGNOSIS — Z91.89 AT HIGH RISK FOR HYPERKALEMIA: ICD-10-CM

## 2025-07-30 DIAGNOSIS — Z79.4 TYPE 2 DIABETES MELLITUS WITH STAGE 3B CHRONIC KIDNEY DISEASE, WITH LONG-TERM CURRENT USE OF INSULIN: ICD-10-CM

## 2025-07-30 DIAGNOSIS — N18.32 TYPE 2 DIABETES MELLITUS WITH STAGE 3B CHRONIC KIDNEY DISEASE, WITH LONG-TERM CURRENT USE OF INSULIN: ICD-10-CM

## 2025-07-30 DIAGNOSIS — N18.32 CKD STAGE G3B/A1, GFR 30-44 AND ALBUMIN CREATININE RATIO <30 MG/G: Primary | ICD-10-CM

## 2025-07-30 DIAGNOSIS — E11.22 TYPE 2 DIABETES MELLITUS WITH STAGE 3B CHRONIC KIDNEY DISEASE, WITH LONG-TERM CURRENT USE OF INSULIN: ICD-10-CM

## 2025-07-30 PROCEDURE — 1159F MED LIST DOCD IN RCRD: CPT | Mod: CPTII,95,, | Performed by: NURSE PRACTITIONER

## 2025-07-30 PROCEDURE — 3044F HG A1C LEVEL LT 7.0%: CPT | Mod: CPTII,95,, | Performed by: NURSE PRACTITIONER

## 2025-07-30 PROCEDURE — 3066F NEPHROPATHY DOC TX: CPT | Mod: CPTII,95,, | Performed by: NURSE PRACTITIONER

## 2025-07-30 PROCEDURE — 4010F ACE/ARB THERAPY RXD/TAKEN: CPT | Mod: CPTII,95,, | Performed by: NURSE PRACTITIONER

## 2025-07-30 PROCEDURE — 98006 SYNCH AUDIO-VIDEO EST MOD 30: CPT | Mod: 95,,, | Performed by: NURSE PRACTITIONER

## 2025-07-30 PROCEDURE — 1160F RVW MEDS BY RX/DR IN RCRD: CPT | Mod: CPTII,95,, | Performed by: NURSE PRACTITIONER

## 2025-07-30 RX ORDER — ERGOCALCIFEROL 1.25 MG/1
50000 CAPSULE ORAL
Qty: 12 CAPSULE | Refills: 0 | Status: SHIPPED | OUTPATIENT
Start: 2025-07-30 | End: 2025-10-28

## 2025-07-30 NOTE — PROGRESS NOTES
The patient location is: [place]  The chief complaint leading to consultation is: CKD management    Visit type: audiovisual    Face to Face time with patient: 12 min  31 minutes of total time spent on the encounter, which includes face to face time and non-face to face time preparing to see the patient (eg, review of tests), Obtaining and/or reviewing separately obtained history, Documenting clinical information in the electronic or other health record, Independently interpreting results (not separately reported) and communicating results to the patient/family/caregiver, or Care coordination (not separately reported).      Each patient to whom he or she provides medical services by telemedicine is:  (1) informed of the relationship between the physician and patient and the respective role of any other health care provider with respect to management of the patient; and (2) notified that he or she may decline to receive medical services by telemedicine and may withdraw from such care at any time.         [facility]  Nephrology Clinic      Chief Complaint: Chronic Kidney Disease (Follow up)      MRN: [id number]    Patient's demographics: Gloria Denis is a 59 y.o. Black or  female born on 1965    History of present illness:   The patient presents to Nephrology clinic today for ongoing management of chronic kidney disease. The patient's pertinent chronic problems include persistent hyperkalemia, diabetes mellitus type 2 (diagnosed in her late 30s), hypertension, dyslipidemia, sleep apnea, anxiety, depression, chronic back pain, GERD, chronic constipation and morbid obesity.  Denies complaints.     Review of Systems  12 point review of systems conducted, negative except as stated in the history of present illness.    Allergies: Patient is allergic to aspirin.     Past Medical History:  has a past medical history of Anxiety disorder, unspecified, Chronic  constipation, CKD (chronic kidney disease), Depression, DM (diabetes mellitus), Dyslipidemia, GERD (gastroesophageal reflux disease), HTN (hypertension), Irritable bowel syndrome, Morbid obesity, and Sleep apnea, unspecified.    Procedure History:  has a past surgical history that includes  section; Cholecystectomy; Arthroscopic removal of loose body from joint; Drainage of oral abscess; Exploration using laparoscope; Upper gastrointestinal endoscopy; Abscess drainage; and Fracture surgery ().    Family History: family history includes Arthritis in her father and mother; COPD in her sister and sister; Cancer in her brother and mother; Diabetes in her father, sister, sister, sister, and sister; Heart attack in her father; Heart disease in her mother; Hypertension in her father and mother; Kidney disease in her father; Kidney failure in her father; Miscarriages / Stillbirths in her mother; Rheum arthritis in her mother; Stroke in her father; Vision loss in her father.    Social History:  reports that she has never smoked. She has never been exposed to tobacco smoke. She has never used smokeless tobacco. She reports that she does not drink alcohol and does not use drugs.    Physical exam  General appearance: Patient is in no acute distress.    Home Medications:  Current Medications[1]    Laboratory data    Lab Results   Component Value Date    WBC 5.59 2025    HGB 12.3 2025    HCT 38.0 2025     2025     2025    K 4.3 2025    CO2 22 2025    BUN 29.0 (H) 2025    CREATININE 1.45 (H) 2025    EGFRNORACEVR 42 2025    CALCIUM 9.6 2025    ALKPHOS 96 2025    ALBUMIN 3.6 2025    BILIDIR 0.2 2022    IBILI 0.30 2022    AST 34 2025    ALT 25 2025    PHOS 3.7 2024      Lab Results   Component Value Date    HGBA1C 6.9 2025    PTH 59.2 2024    OHJTXISP70EL 35 11/15/2024    HIV Nonreactive  05/17/2017    HEPCAB Reactive (A) 05/17/2017     Urine:  Lab Results   Component Value Date    APPEARANCEUA Clear 05/20/2025    SGUA 1.032 (H) 05/20/2025    PROTEINUA Negative 05/20/2025    KETONESUA Negative 05/20/2025    LEUKOCYTESUR Negative 05/20/2025    RBCUA 0-5 05/20/2025    WBCUA 0-5 05/20/2025    BACTERIA None Seen 05/20/2025    SQEPUA Occasional (A) 05/20/2025    HYALINECASTS None Seen 05/20/2025    CREATRANDUR 142.5 (H) 04/17/2025    PROTEINURINE 9.4 04/17/2025    UPROTCREA 0.1 04/17/2025    MICALBCREAT 35.3 (H) 05/13/2024      Microbiology Results (Last 14 Days)       ** No results found for the last 336 hours. **            Imaging  US Retroperitoneal Limited 05/17/2021  FINDINGS: The bilateral kidneys demonstrate parenchymal thinning.  The right kidney measures 10.5 x 4.1 x 4.3 cm and is otherwise  unremarkable. There is no right hydronephrosis.  The left kidney measures 10.5 x 4.2 x 5.2 cm and is otherwise  unremarkable. There is no left hydronephrosis.     IMPRESSION:  1. Bilateral medical renal disease.  Electronically Signed By: Shai Ga MD  Date/Time Signed: 05/17/2021 14:05    Impression    ICD-10-CM ICD-9-CM   1. CKD stage G3b/A1, GFR 30-44 and albumin creatinine ratio <30 mg/g  N18.32 585.3   2. Primary hypertension  I10 401.9   3. At high risk for hyperkalemia  Z91.89 [id number]   4. Type 2 diabetes mellitus with stage 3b chronic kidney disease, with long-term current use of insulin  E11.22 250.40    N18.32 585.3    Z79.4 V58.67   5. Severe obesity (BMI 35.0-39.9) with comorbidity  E66.01 278.01        Plan  CKD stage G3b/A1, GFR 30-44 and albumin creatinine ratio <30 mg/g  -     Comprehensive Metabolic Panel; Future; Expected date: 01/20/2026  -     CBC Auto Differential; Future; Expected date: 01/20/2026  -     Microalbumin/Creatinine Ratio, Urine; Future; Expected date: 01/20/2026  -     PTH, Intact; Future; Expected date: 01/20/2026  -     Urinalysis, Reflex to Urine Culture; Future;  Expected date: 01/20/2026  -     Vitamin D; Future; Expected date: 01/20/2026  -     Uric Acid; Future; Expected date: 01/20/2026  Probably diabetic kidney disease.  Serum creatinine is stable, there is no significant proteinuria, imaging of the kidneys was essentially unremarkable. Continue risk factor management. Continue enalapril with close monitoring of serum potassium.  Continue SGLT2 inhibitor therapy.  Continue renal sparing activities:    -Follow low sodium diet (2 grams a day)  -Control diabetes (goal A1C <7.0%)  -Control high blood pressure ( goal BP < 130/80, please record BP at home every day and bring log to next office visit)  -Exercise at least 30 minutes a day, 5 days a week.  -Maintain healthy weight.  -Decrease or stop alcohol use  -Do not smoke  -Stay well hydrated  -Receive Pneumovax, Flu, and HBV vaccines if indicated.  -Do not take NSAIDs (Ibuprofen, Naproxen, Aleve, Advil, Toradol, Mobic), may take only Tylenol as needed for pain/headaches.  -Take cholesterol-lowering medications as prescribed (LDL goal <100)    Primary hypertension  Blood pressure reading is at goal.  Continue current medication regimen.  Patient was advised on lifestyle modifications to manage hypertension, including adopting a low-sodium diet (limit processed foods and salt intake), engaging in regular physical activity (at least 150 minutes of moderate exercise per week), maintaining a healthy weight, and moderating alcohol consumption. Continue periodic blood pressure monitoring at home and on follow up visits.      At high risk for hyperkalemia  Patient is eukalemic.  Continue periodic monitoring.      Type 2 diabetes mellitus with stage 3b chronic kidney disease, with long-term current use of insulin  A1C ia at goal.  Continue SGLT2 inhibitor and ACE inhibitor therapy.        Severe obesity (BMI 35.0-39.9) with comorbidity  Lifestyle and dietary interventions discussed, patient encouraged to maintain non-sedentary  "lifestyle and well-balanced diet.     Other orders  -     ergocalciferol (ERGOCALCIFEROL) 50,000 unit Cap; Take 1 capsule (50,000 Units total) by mouth every 7 days.  Dispense: 12 capsule; Refill: 0        Follow up in about 6 months (around 2026).         Charlene Pineda NP  [facility]            [1]   Current Outpatient Medications:     atorvastatin (LIPITOR) 40 MG tablet, Take 1 tablet (40 mg total) by mouth every evening., Disp: 90 tablet, Rfl: 2    baclofen (LIORESAL) 10 MG tablet, Take 1 tablet (10 mg total) by mouth 2 (two) times daily., Disp: 60 tablet, Rfl: 2    BD CAYLA 2ND GEN PEN NEEDLE 32 gauge x 5/32" Ndle, USE TO INJECT INSULIN EVERY  NIGHT, Disp: 100 each, Rfl: 0    blood sugar diagnostic Strp, To check BG 2 times daily, to use with insurance preferred meter, Disp: 100 each, Rfl: 3    blood-glucose meter kit, To check BG 2 times daily, to use with insurance preferred meter, Disp: 1 each, Rfl: 0    DULoxetine (CYMBALTA) 60 MG capsule, Take 1 capsule (60 mg total) by mouth every evening., Disp: 90 capsule, Rfl: 2    empagliflozin (JARDIANCE) 10 mg tablet, Take 1 tablet (10 mg total) by mouth once daily., Disp: 90 tablet, Rfl: 2    enalapril (VASOTEC) 2.5 MG tablet, Take 1 tablet (2.5 mg total) by mouth once daily., Disp: 90 tablet, Rfl: 2    hydrOXYzine pamoate (VISTARIL) 25 MG Cap, Take 1 capsule (25 mg total) by mouth 3 (three) times daily as needed (anxiety)., Disp: 90 capsule, Rfl: 2    lancets (ACCU-CHEK SOFTCLIX LANCETS) Misc, USE 1  TO CHECK GLUCOSE TWICE DAILY, Disp: 100 each, Rfl: 6    LINZESS 290 mcg Cap capsule, Take 1 capsule (290 mcg total) by mouth before breakfast., Disp: 90 capsule, Rfl: 2    metoprolol tartrate (LOPRESSOR) 25 MG tablet, Take 1 tablet by mouth twice daily, Disp: 180 tablet, Rfl: 2    SEMGLEE,INSULIN GLARG-YFGN, unit/mL (3 mL) InPn, SMARTSI Unit(s) SUB-Q Every Evening, Disp: , Rfl:     SSD 1 % cream, Apply topically 2 (two) times daily., Disp: , Rfl: "     tirzepatide (MOUNJARO) 12.5 mg/0.5 mL PnIj, Inject 12.5 mg into the skin every 7 days., Disp: 2 mL, Rfl: 8    triamcinolone acetonide 0.1% (KENALOG) 0.1 % cream, Apply topically 2 (two) times daily., Disp: , Rfl:     ergocalciferol (ERGOCALCIFEROL) 50,000 unit Cap, Take 1 capsule (50,000 Units total) by mouth every 7 days., Disp: 12 capsule, Rfl: 0    omeprazole (PRILOSEC) 40 MG capsule, Take 1 capsule (40 mg total) by mouth once daily. (Patient not taking: Reported on 5/20/2025), Disp: 90 capsule, Rfl: 2

## 2025-08-06 DIAGNOSIS — N18.31 TYPE 2 DIABETES MELLITUS WITH STAGE 3A CHRONIC KIDNEY DISEASE, WITH LONG-TERM CURRENT USE OF INSULIN: ICD-10-CM

## 2025-08-06 DIAGNOSIS — Z79.4 TYPE 2 DIABETES MELLITUS WITH STAGE 3A CHRONIC KIDNEY DISEASE, WITH LONG-TERM CURRENT USE OF INSULIN: ICD-10-CM

## 2025-08-06 DIAGNOSIS — E11.22 TYPE 2 DIABETES MELLITUS WITH STAGE 3A CHRONIC KIDNEY DISEASE, WITH LONG-TERM CURRENT USE OF INSULIN: ICD-10-CM

## 2025-08-06 RX ORDER — PEN NEEDLE, DIABETIC 32GX 5/32"
NEEDLE, DISPOSABLE MISCELLANEOUS
Qty: 100 EACH | Refills: 0 | Status: SHIPPED | OUTPATIENT
Start: 2025-08-06

## 2025-08-07 ENCOUNTER — TELEPHONE (OUTPATIENT)
Dept: INTERNAL MEDICINE | Facility: CLINIC | Age: 60
End: 2025-08-07
Payer: MEDICARE

## 2025-08-07 ENCOUNTER — TELEPHONE (OUTPATIENT)
Dept: CARDIOLOGY | Facility: CLINIC | Age: 60
End: 2025-08-07
Payer: MEDICARE

## 2025-08-07 NOTE — TELEPHONE ENCOUNTER
Spoke with pt. She states her tirzepatide was increased in may to 12.5mg. she was not having any bad side effects. This week she has been feeling weak and dizzy upon standing. Her cbg's have been ranging from 70-80. Blood pressure today was 131/78 and a pulse of 116. Pt advised me that cardiology dc'd lantus. I advised the pt to go to the ER due to the symptoms she was having.

## 2025-08-07 NOTE — TELEPHONE ENCOUNTER
Copied from CRM #[id number]. Topic: General Inquiry - Patient Advice  >> Aug 7, 2025 11:16 AM Lora wrote:  .Who Called: Gloria Denis    Caller is requesting assistance/information from provider's office.    Symptoms (please be specific): pt states that she wants to discuss meds    How long has patient had these symptoms:  few days   List of preferred pharmacies on file (remove unneeded): [unfilled]  If different, enter pharmacy into here including location and phone number: [facility]       Preferred Method of Contact: Phone Call  Patient's Preferred Phone Number on File: [phone]   Best Call Back Number, if different:  Additional Information:

## 2025-08-07 NOTE — TELEPHONE ENCOUNTER
PT CALLED STATING THAT SHE IS CONCERNED ABOUT HEARTRATE FEELING SHAKEY UNABLE TO GO TO ED DUE TO TRANSPORTATION  STATES B/P /76 HR---115   WAS TOLD BY DR CUMMINS TO STOP METOPROLOL IF SHE COULD  SHE HADN'T TAKEN ANY SINCE 5/20 THIS IS THE FIRST TIME SHE HAS FELT BAD I INSTRUCTED HER TO TAKE ONE AND TO CALL US TOMORROW AND LET US KNOW HOW SHE FELT   SHE MAY NEED A NURSE VISIT FOR F/U

## 2025-08-08 ENCOUNTER — TELEPHONE (OUTPATIENT)
Dept: CARDIOLOGY | Facility: CLINIC | Age: 60
End: 2025-08-08
Payer: MEDICARE

## 2025-08-08 NOTE — TELEPHONE ENCOUNTER
Pt reported yesterday:  PT CALLED STATING THAT SHE IS CONCERNED ABOUT HEARTRATE FEELING SHAKEY UNABLE TO GO TO ED DUE TO TRANSPORTATION  STATES B/P /76 HR---115   WAS TOLD BY DR CUMMINS TO STOP METOPROLOL IF SHE COULD  SHE HADN'T TAKEN ANY SINCE 5/20 THIS IS THE FIRST TIME SHE HAS FELT BAD I INSTRUCTED HER TO TAKE ONE AND TO CALL US TOMORROW AND LET US KNOW HOW SHE FELT   SHE MAY NEED A NURSE VISIT FOR F/U      Electronically signed by Jeannine Bruce RN at 8/7/2025  3:28 PM    I spoke to her today at 12:55 pm 8/8/25. She reports feeling better than she did yesterday, but is still weak. BP today is 115/84 hr 90. Was 155/86 hr 135 a few days ago. Her glucose was 80 today. She reports being unable to eat. C/o general weakness and shaky feeling. I advised her she needs to report to ED for treatment and evaluation. She sated she did not have transportation. I advised her to call ambulance if needed. Understanding verbalized.

## 2025-08-11 ENCOUNTER — TELEPHONE (OUTPATIENT)
Dept: INTERNAL MEDICINE | Facility: CLINIC | Age: 60
End: 2025-08-11
Payer: MEDICARE

## 2025-08-18 ENCOUNTER — TELEPHONE (OUTPATIENT)
Dept: INTERNAL MEDICINE | Facility: CLINIC | Age: 60
End: 2025-08-18
Payer: MEDICARE

## 2025-08-18 DIAGNOSIS — N18.31 TYPE 2 DIABETES MELLITUS WITH STAGE 3A CHRONIC KIDNEY DISEASE, WITH LONG-TERM CURRENT USE OF INSULIN: Primary | ICD-10-CM

## 2025-08-18 DIAGNOSIS — E11.22 TYPE 2 DIABETES MELLITUS WITH STAGE 3A CHRONIC KIDNEY DISEASE, WITH LONG-TERM CURRENT USE OF INSULIN: Primary | ICD-10-CM

## 2025-08-18 DIAGNOSIS — Z79.4 TYPE 2 DIABETES MELLITUS WITH STAGE 3A CHRONIC KIDNEY DISEASE, WITH LONG-TERM CURRENT USE OF INSULIN: Primary | ICD-10-CM

## 2025-08-18 RX ORDER — TIRZEPATIDE 10 MG/.5ML
10 INJECTION, SOLUTION SUBCUTANEOUS
Qty: 2 ML | Refills: 2 | Status: SHIPPED | OUTPATIENT
Start: 2025-08-18 | End: 2025-08-18

## 2025-08-28 DIAGNOSIS — F41.8 MIXED ANXIETY DEPRESSIVE DISORDER: Chronic | ICD-10-CM

## 2025-08-28 RX ORDER — HYDROXYZINE PAMOATE 25 MG/1
25 CAPSULE ORAL 3 TIMES DAILY PRN
Qty: 90 CAPSULE | Refills: 2 | Status: SHIPPED | OUTPATIENT
Start: 2025-08-28 | End: 2026-05-25